# Patient Record
Sex: FEMALE | Race: WHITE | NOT HISPANIC OR LATINO | Employment: OTHER | URBAN - METROPOLITAN AREA
[De-identification: names, ages, dates, MRNs, and addresses within clinical notes are randomized per-mention and may not be internally consistent; named-entity substitution may affect disease eponyms.]

---

## 2017-10-04 ENCOUNTER — AMBULATORY - HEALTHEAST (OUTPATIENT)
Dept: NURSING | Facility: CLINIC | Age: 72
End: 2017-10-04

## 2017-10-23 ENCOUNTER — OFFICE VISIT - HEALTHEAST (OUTPATIENT)
Dept: FAMILY MEDICINE | Facility: CLINIC | Age: 72
End: 2017-10-23

## 2017-10-23 DIAGNOSIS — R73.09 ELEVATED GLUCOSE: ICD-10-CM

## 2017-10-23 DIAGNOSIS — K21.00 REFLUX ESOPHAGITIS: ICD-10-CM

## 2017-10-23 DIAGNOSIS — Z00.00 ROUTINE GENERAL MEDICAL EXAMINATION AT A HEALTH CARE FACILITY: ICD-10-CM

## 2017-10-23 DIAGNOSIS — Z12.31 ENCOUNTER FOR SCREENING MAMMOGRAM FOR BREAST CANCER: ICD-10-CM

## 2017-10-23 DIAGNOSIS — M89.9 DISORDER OF BONE AND CARTILAGE: ICD-10-CM

## 2017-10-23 DIAGNOSIS — M54.32 SCIATICA OF LEFT SIDE: ICD-10-CM

## 2017-10-23 DIAGNOSIS — E78.00 PURE HYPERCHOLESTEROLEMIA: ICD-10-CM

## 2017-10-23 DIAGNOSIS — M94.9 DISORDER OF BONE AND CARTILAGE: ICD-10-CM

## 2017-10-23 DIAGNOSIS — G47.00 INSOMNIA: ICD-10-CM

## 2017-10-23 LAB
CHOLEST SERPL-MCNC: 272 MG/DL
FASTING STATUS PATIENT QL REPORTED: YES
HBA1C MFR BLD: 5.5 % (ref 3.5–6)
HDLC SERPL-MCNC: 86 MG/DL
LDLC SERPL CALC-MCNC: 165 MG/DL
TRIGL SERPL-MCNC: 104 MG/DL

## 2017-10-23 ASSESSMENT — MIFFLIN-ST. JEOR: SCORE: 1457.97

## 2017-10-31 ENCOUNTER — COMMUNICATION - HEALTHEAST (OUTPATIENT)
Dept: FAMILY MEDICINE | Facility: CLINIC | Age: 72
End: 2017-10-31

## 2017-10-31 ENCOUNTER — AMBULATORY - HEALTHEAST (OUTPATIENT)
Dept: FAMILY MEDICINE | Facility: CLINIC | Age: 72
End: 2017-10-31

## 2017-10-31 DIAGNOSIS — R74.8 ELEVATED LIVER ENZYMES: ICD-10-CM

## 2017-11-06 ENCOUNTER — HOSPITAL ENCOUNTER (OUTPATIENT)
Dept: MAMMOGRAPHY | Facility: CLINIC | Age: 72
Discharge: HOME | End: 2017-11-06
Attending: FAMILY MEDICINE

## 2017-11-06 DIAGNOSIS — Z12.31 ENCOUNTER FOR SCREENING MAMMOGRAM FOR BREAST CANCER: ICD-10-CM

## 2017-11-07 ENCOUNTER — RECORDS - HEALTHEAST (OUTPATIENT)
Dept: ADMINISTRATIVE | Facility: OTHER | Age: 72
End: 2017-11-07

## 2017-11-07 ENCOUNTER — RECORDS - HEALTHEAST (OUTPATIENT)
Dept: BONE DENSITY | Facility: CLINIC | Age: 72
End: 2017-11-07

## 2017-11-07 DIAGNOSIS — M89.9 DISORDER OF BONE, UNSPECIFIED: ICD-10-CM

## 2017-11-07 DIAGNOSIS — M94.9 DISORDER OF CARTILAGE, UNSPECIFIED: ICD-10-CM

## 2017-11-21 ENCOUNTER — COMMUNICATION - HEALTHEAST (OUTPATIENT)
Dept: FAMILY MEDICINE | Facility: CLINIC | Age: 72
End: 2017-11-21

## 2017-11-24 ENCOUNTER — OFFICE VISIT - HEALTHEAST (OUTPATIENT)
Dept: PHYSICAL THERAPY | Facility: REHABILITATION | Age: 72
End: 2017-11-24

## 2017-11-24 DIAGNOSIS — M53.86 DECREASED ROM OF LUMBAR SPINE: ICD-10-CM

## 2017-11-24 DIAGNOSIS — M54.42 ACUTE LEFT-SIDED LOW BACK PAIN WITH LEFT-SIDED SCIATICA: ICD-10-CM

## 2017-11-24 DIAGNOSIS — M62.81 GENERALIZED MUSCLE WEAKNESS: ICD-10-CM

## 2017-12-05 ENCOUNTER — OFFICE VISIT - HEALTHEAST (OUTPATIENT)
Dept: PHYSICAL THERAPY | Facility: REHABILITATION | Age: 72
End: 2017-12-05

## 2017-12-05 DIAGNOSIS — M62.81 GENERALIZED MUSCLE WEAKNESS: ICD-10-CM

## 2017-12-05 DIAGNOSIS — M53.86 DECREASED ROM OF LUMBAR SPINE: ICD-10-CM

## 2017-12-05 DIAGNOSIS — M54.42 ACUTE LEFT-SIDED LOW BACK PAIN WITH LEFT-SIDED SCIATICA: ICD-10-CM

## 2017-12-12 ENCOUNTER — OFFICE VISIT - HEALTHEAST (OUTPATIENT)
Dept: PHYSICAL THERAPY | Facility: REHABILITATION | Age: 72
End: 2017-12-12

## 2017-12-12 DIAGNOSIS — M53.86 DECREASED ROM OF LUMBAR SPINE: ICD-10-CM

## 2017-12-12 DIAGNOSIS — M62.81 GENERALIZED MUSCLE WEAKNESS: ICD-10-CM

## 2017-12-12 DIAGNOSIS — M54.42 ACUTE LEFT-SIDED LOW BACK PAIN WITH LEFT-SIDED SCIATICA: ICD-10-CM

## 2017-12-19 ENCOUNTER — OFFICE VISIT - HEALTHEAST (OUTPATIENT)
Dept: PHYSICAL THERAPY | Facility: REHABILITATION | Age: 72
End: 2017-12-19

## 2017-12-19 DIAGNOSIS — M62.81 GENERALIZED MUSCLE WEAKNESS: ICD-10-CM

## 2017-12-19 DIAGNOSIS — M53.86 DECREASED ROM OF LUMBAR SPINE: ICD-10-CM

## 2017-12-19 DIAGNOSIS — M54.42 ACUTE LEFT-SIDED LOW BACK PAIN WITH LEFT-SIDED SCIATICA: ICD-10-CM

## 2017-12-26 ENCOUNTER — OFFICE VISIT - HEALTHEAST (OUTPATIENT)
Dept: PHYSICAL THERAPY | Facility: REHABILITATION | Age: 72
End: 2017-12-26

## 2017-12-26 DIAGNOSIS — M53.86 DECREASED ROM OF LUMBAR SPINE: ICD-10-CM

## 2017-12-26 DIAGNOSIS — M54.42 ACUTE LEFT-SIDED LOW BACK PAIN WITH LEFT-SIDED SCIATICA: ICD-10-CM

## 2017-12-26 DIAGNOSIS — M62.81 GENERALIZED MUSCLE WEAKNESS: ICD-10-CM

## 2017-12-27 ENCOUNTER — RECORDS - HEALTHEAST (OUTPATIENT)
Dept: GENERAL RADIOLOGY | Facility: CLINIC | Age: 72
End: 2017-12-27

## 2017-12-27 ENCOUNTER — OFFICE VISIT - HEALTHEAST (OUTPATIENT)
Dept: FAMILY MEDICINE | Facility: CLINIC | Age: 72
End: 2017-12-27

## 2017-12-27 DIAGNOSIS — M81.0 AGE-RELATED OSTEOPOROSIS WITHOUT CURRENT PATHOLOGICAL FRACTURE: ICD-10-CM

## 2017-12-27 DIAGNOSIS — M81.0 OSTEOPOROSIS: ICD-10-CM

## 2017-12-27 DIAGNOSIS — M54.32 LEFT SIDED SCIATICA: ICD-10-CM

## 2017-12-27 DIAGNOSIS — M54.32 SCIATICA, LEFT SIDE: ICD-10-CM

## 2017-12-27 ASSESSMENT — MIFFLIN-ST. JEOR: SCORE: 1430.75

## 2017-12-28 ENCOUNTER — COMMUNICATION - HEALTHEAST (OUTPATIENT)
Dept: FAMILY MEDICINE | Facility: CLINIC | Age: 72
End: 2017-12-28

## 2018-01-03 ENCOUNTER — HOSPITAL ENCOUNTER (OUTPATIENT)
Dept: PHYSICAL MEDICINE AND REHAB | Facility: CLINIC | Age: 73
Discharge: HOME | End: 2018-01-03
Attending: FAMILY MEDICINE

## 2018-01-03 DIAGNOSIS — M51.369 LUMBAR DEGENERATIVE DISC DISEASE: ICD-10-CM

## 2018-01-03 DIAGNOSIS — M54.50 BILATERAL LOW BACK PAIN: ICD-10-CM

## 2018-01-03 DIAGNOSIS — M47.816 LUMBAR FACET ARTHROPATHY: ICD-10-CM

## 2018-01-03 DIAGNOSIS — M54.16 LUMBAR RADICULITIS: ICD-10-CM

## 2018-01-03 DIAGNOSIS — M43.16 SPONDYLOLISTHESIS, LUMBAR REGION: ICD-10-CM

## 2018-01-09 ENCOUNTER — HOSPITAL ENCOUNTER (OUTPATIENT)
Dept: MRI IMAGING | Facility: CLINIC | Age: 73
Discharge: HOME | End: 2018-01-09

## 2018-01-09 DIAGNOSIS — M47.816 LUMBAR FACET ARTHROPATHY: ICD-10-CM

## 2018-01-09 DIAGNOSIS — M43.16 SPONDYLOLISTHESIS, LUMBAR REGION: ICD-10-CM

## 2018-01-09 DIAGNOSIS — M54.50 BILATERAL LOW BACK PAIN: ICD-10-CM

## 2018-01-09 DIAGNOSIS — M51.369 LUMBAR DEGENERATIVE DISC DISEASE: ICD-10-CM

## 2018-01-09 DIAGNOSIS — M12.88 OTHER SPECIFIC ARTHROPATHIES, NOT ELSEWHERE CLASSIFIED, OTHER SPECIFIED SITE: ICD-10-CM

## 2018-01-09 DIAGNOSIS — M54.16 LUMBAR RADICULITIS: ICD-10-CM

## 2018-01-10 ENCOUNTER — COMMUNICATION - HEALTHEAST (OUTPATIENT)
Dept: PHYSICAL MEDICINE AND REHAB | Facility: CLINIC | Age: 73
End: 2018-01-10

## 2018-01-10 DIAGNOSIS — M54.50 LOW BACK PAIN: ICD-10-CM

## 2018-01-22 ENCOUNTER — HOSPITAL ENCOUNTER (OUTPATIENT)
Dept: PHYSICAL MEDICINE AND REHAB | Facility: CLINIC | Age: 73
Discharge: HOME | End: 2018-01-22
Attending: PHYSICAL MEDICINE & REHABILITATION

## 2018-01-22 DIAGNOSIS — M54.50 LOW BACK PAIN: ICD-10-CM

## 2018-02-07 ENCOUNTER — HOSPITAL ENCOUNTER (OUTPATIENT)
Dept: PHYSICAL MEDICINE AND REHAB | Facility: CLINIC | Age: 73
Discharge: HOME | End: 2018-02-07
Attending: NURSE PRACTITIONER

## 2018-02-07 DIAGNOSIS — M43.16 SPONDYLOLISTHESIS, LUMBAR REGION: ICD-10-CM

## 2018-02-07 DIAGNOSIS — M51.369 LUMBAR DEGENERATIVE DISC DISEASE: ICD-10-CM

## 2018-02-07 DIAGNOSIS — M54.16 LUMBAR RADICULITIS: ICD-10-CM

## 2018-02-07 DIAGNOSIS — M54.16 LEFT LUMBAR RADICULOPATHY: ICD-10-CM

## 2018-02-07 DIAGNOSIS — M48.061 LUMBAR STENOSIS: ICD-10-CM

## 2018-02-07 DIAGNOSIS — M47.816 LUMBAR FACET ARTHROPATHY: ICD-10-CM

## 2018-02-07 DIAGNOSIS — M54.50 BILATERAL LOW BACK PAIN: ICD-10-CM

## 2018-02-19 ENCOUNTER — HOSPITAL ENCOUNTER (OUTPATIENT)
Dept: RADIOLOGY | Facility: CLINIC | Age: 73
Discharge: HOME | End: 2018-02-19

## 2018-02-19 ENCOUNTER — HOSPITAL ENCOUNTER (OUTPATIENT)
Dept: PHYSICAL MEDICINE AND REHAB | Facility: CLINIC | Age: 73
Discharge: HOME | End: 2018-02-19
Attending: PHYSICAL MEDICINE & REHABILITATION

## 2018-02-19 DIAGNOSIS — M54.16 LUMBAR RADICULITIS: ICD-10-CM

## 2018-02-19 DIAGNOSIS — M54.50 BILATERAL LOW BACK PAIN: ICD-10-CM

## 2018-02-19 DIAGNOSIS — M43.16 SPONDYLOLISTHESIS, LUMBAR REGION: ICD-10-CM

## 2018-02-19 DIAGNOSIS — M54.16 LEFT LUMBAR RADICULOPATHY: ICD-10-CM

## 2018-02-19 DIAGNOSIS — M48.061 LUMBAR STENOSIS: ICD-10-CM

## 2018-02-21 ENCOUNTER — COMMUNICATION - HEALTHEAST (OUTPATIENT)
Dept: FAMILY MEDICINE | Facility: CLINIC | Age: 73
End: 2018-02-21

## 2018-02-21 ENCOUNTER — COMMUNICATION - HEALTHEAST (OUTPATIENT)
Dept: PHYSICAL MEDICINE AND REHAB | Facility: CLINIC | Age: 73
End: 2018-02-21

## 2018-02-21 DIAGNOSIS — K63.5 COLON POLYPS: ICD-10-CM

## 2018-02-27 ENCOUNTER — COMMUNICATION - HEALTHEAST (OUTPATIENT)
Dept: PHYSICAL MEDICINE AND REHAB | Facility: CLINIC | Age: 73
End: 2018-02-27

## 2018-02-27 DIAGNOSIS — M54.9 BACK PAIN: ICD-10-CM

## 2018-02-27 DIAGNOSIS — M54.16 LUMBAR RADICULITIS: ICD-10-CM

## 2018-04-02 ENCOUNTER — HOSPITAL ENCOUNTER (OUTPATIENT)
Dept: PHYSICAL MEDICINE AND REHAB | Facility: CLINIC | Age: 73
Discharge: HOME | End: 2018-04-02
Attending: PHYSICAL MEDICINE & REHABILITATION

## 2018-04-02 DIAGNOSIS — M54.16 LUMBAR RADICULITIS: ICD-10-CM

## 2018-04-16 ENCOUNTER — HOSPITAL ENCOUNTER (OUTPATIENT)
Dept: PHYSICAL MEDICINE AND REHAB | Facility: CLINIC | Age: 73
Discharge: HOME | End: 2018-04-16
Attending: NURSE PRACTITIONER

## 2018-04-16 DIAGNOSIS — M51.369 LUMBAR DEGENERATIVE DISC DISEASE: ICD-10-CM

## 2018-04-16 DIAGNOSIS — M54.16 LEFT LUMBAR RADICULOPATHY: ICD-10-CM

## 2018-04-16 DIAGNOSIS — M43.16 SPONDYLOLISTHESIS, LUMBAR REGION: ICD-10-CM

## 2018-07-17 ENCOUNTER — RECORDS - HEALTHEAST (OUTPATIENT)
Dept: ADMINISTRATIVE | Facility: OTHER | Age: 73
End: 2018-07-17

## 2018-07-18 ENCOUNTER — COMMUNICATION - HEALTHEAST (OUTPATIENT)
Dept: SCHEDULING | Facility: CLINIC | Age: 73
End: 2018-07-18

## 2018-07-19 ENCOUNTER — RECORDS - HEALTHEAST (OUTPATIENT)
Dept: ADMINISTRATIVE | Facility: OTHER | Age: 73
End: 2018-07-19

## 2018-07-23 ENCOUNTER — OFFICE VISIT - HEALTHEAST (OUTPATIENT)
Dept: FAMILY MEDICINE | Facility: CLINIC | Age: 73
End: 2018-07-23

## 2018-07-23 DIAGNOSIS — R03.0 ELEVATED BP WITHOUT DIAGNOSIS OF HYPERTENSION: ICD-10-CM

## 2018-07-23 ASSESSMENT — MIFFLIN-ST. JEOR: SCORE: 1404.67

## 2018-08-01 ENCOUNTER — COMMUNICATION - HEALTHEAST (OUTPATIENT)
Dept: FAMILY MEDICINE | Facility: CLINIC | Age: 73
End: 2018-08-01

## 2018-08-01 DIAGNOSIS — I10 HTN (HYPERTENSION): ICD-10-CM

## 2018-08-08 ENCOUNTER — OFFICE VISIT - HEALTHEAST (OUTPATIENT)
Dept: FAMILY MEDICINE | Facility: CLINIC | Age: 73
End: 2018-08-08

## 2018-08-08 DIAGNOSIS — I10 HTN (HYPERTENSION): ICD-10-CM

## 2018-08-08 LAB
ALBUMIN SERPL-MCNC: 4 G/DL (ref 3.5–5)
ALP SERPL-CCNC: 87 U/L (ref 45–120)
ALT SERPL W P-5'-P-CCNC: 20 U/L (ref 0–45)
ANION GAP SERPL CALCULATED.3IONS-SCNC: 11 MMOL/L (ref 5–18)
AST SERPL W P-5'-P-CCNC: 22 U/L (ref 0–40)
BILIRUB SERPL-MCNC: 1 MG/DL (ref 0–1)
BUN SERPL-MCNC: 21 MG/DL (ref 8–28)
CALCIUM SERPL-MCNC: 9.1 MG/DL (ref 8.5–10.5)
CHLORIDE BLD-SCNC: 105 MMOL/L (ref 98–107)
CO2 SERPL-SCNC: 24 MMOL/L (ref 22–31)
CREAT SERPL-MCNC: 0.77 MG/DL (ref 0.6–1.1)
GFR SERPL CREATININE-BSD FRML MDRD: >60 ML/MIN/1.73M2
GLUCOSE BLD-MCNC: 91 MG/DL (ref 70–125)
POTASSIUM BLD-SCNC: 4.8 MMOL/L (ref 3.5–5)
PROT SERPL-MCNC: 6.9 G/DL (ref 6–8)
SODIUM SERPL-SCNC: 140 MMOL/L (ref 136–145)

## 2018-09-06 ENCOUNTER — COMMUNICATION - HEALTHEAST (OUTPATIENT)
Dept: FAMILY MEDICINE | Facility: CLINIC | Age: 73
End: 2018-09-06

## 2018-09-06 ENCOUNTER — AMBULATORY - HEALTHEAST (OUTPATIENT)
Dept: FAMILY MEDICINE | Facility: CLINIC | Age: 73
End: 2018-09-06

## 2018-09-14 ENCOUNTER — OFFICE VISIT - HEALTHEAST (OUTPATIENT)
Dept: FAMILY MEDICINE | Facility: CLINIC | Age: 73
End: 2018-09-14

## 2018-09-14 DIAGNOSIS — I10 ESSENTIAL HYPERTENSION: ICD-10-CM

## 2018-09-14 DIAGNOSIS — R05.9 COUGH: ICD-10-CM

## 2018-09-14 LAB
ANION GAP SERPL CALCULATED.3IONS-SCNC: 9 MMOL/L (ref 5–18)
BUN SERPL-MCNC: 41 MG/DL (ref 8–28)
CALCIUM SERPL-MCNC: 9.9 MG/DL (ref 8.5–10.5)
CHLORIDE BLD-SCNC: 103 MMOL/L (ref 98–107)
CO2 SERPL-SCNC: 28 MMOL/L (ref 22–31)
CREAT SERPL-MCNC: 1.2 MG/DL (ref 0.6–1.1)
GFR SERPL CREATININE-BSD FRML MDRD: 44 ML/MIN/1.73M2
GLUCOSE BLD-MCNC: 92 MG/DL (ref 70–125)
MAGNESIUM SERPL-MCNC: 2.5 MG/DL (ref 1.8–2.6)
POTASSIUM BLD-SCNC: 5.3 MMOL/L (ref 3.5–5)
SODIUM SERPL-SCNC: 140 MMOL/L (ref 136–145)

## 2018-09-16 ENCOUNTER — COMMUNICATION - HEALTHEAST (OUTPATIENT)
Dept: FAMILY MEDICINE | Facility: CLINIC | Age: 73
End: 2018-09-16

## 2018-10-26 ENCOUNTER — OFFICE VISIT - HEALTHEAST (OUTPATIENT)
Dept: FAMILY MEDICINE | Facility: CLINIC | Age: 73
End: 2018-10-26

## 2018-10-26 DIAGNOSIS — I10 ESSENTIAL HYPERTENSION: ICD-10-CM

## 2018-10-26 DIAGNOSIS — R74.8 ELEVATED LIVER ENZYMES: ICD-10-CM

## 2018-10-26 DIAGNOSIS — L57.0 AK (ACTINIC KERATOSIS): ICD-10-CM

## 2018-10-26 LAB
ALBUMIN SERPL-MCNC: 4.2 G/DL (ref 3.5–5)
ALP SERPL-CCNC: 87 U/L (ref 45–120)
ALT SERPL W P-5'-P-CCNC: 20 U/L (ref 0–45)
ANION GAP SERPL CALCULATED.3IONS-SCNC: 7 MMOL/L (ref 5–18)
AST SERPL W P-5'-P-CCNC: 19 U/L (ref 0–40)
BILIRUB DIRECT SERPL-MCNC: 0.3 MG/DL
BILIRUB SERPL-MCNC: 1 MG/DL (ref 0–1)
BUN SERPL-MCNC: 32 MG/DL (ref 8–28)
CALCIUM SERPL-MCNC: 9.8 MG/DL (ref 8.5–10.5)
CHLORIDE BLD-SCNC: 105 MMOL/L (ref 98–107)
CO2 SERPL-SCNC: 30 MMOL/L (ref 22–31)
CREAT SERPL-MCNC: 1.2 MG/DL (ref 0.6–1.1)
GFR SERPL CREATININE-BSD FRML MDRD: 44 ML/MIN/1.73M2
GLUCOSE BLD-MCNC: 90 MG/DL (ref 70–125)
POTASSIUM BLD-SCNC: 4.5 MMOL/L (ref 3.5–5)
PROT SERPL-MCNC: 7.2 G/DL (ref 6–8)
SODIUM SERPL-SCNC: 142 MMOL/L (ref 136–145)

## 2018-10-26 ASSESSMENT — MIFFLIN-ST. JEOR: SCORE: 1421.68

## 2018-10-29 ENCOUNTER — COMMUNICATION - HEALTHEAST (OUTPATIENT)
Dept: FAMILY MEDICINE | Facility: CLINIC | Age: 73
End: 2018-10-29

## 2018-12-20 ENCOUNTER — HOSPITAL ENCOUNTER (OUTPATIENT)
Dept: PHYSICAL MEDICINE AND REHAB | Facility: CLINIC | Age: 73
Discharge: HOME | End: 2018-12-20
Attending: NURSE PRACTITIONER

## 2018-12-20 DIAGNOSIS — G89.29 CHRONIC BILATERAL LOW BACK PAIN WITH BILATERAL SCIATICA: ICD-10-CM

## 2018-12-20 DIAGNOSIS — M54.41 CHRONIC BILATERAL LOW BACK PAIN WITH BILATERAL SCIATICA: ICD-10-CM

## 2018-12-20 DIAGNOSIS — M43.16 SPONDYLOLISTHESIS, LUMBAR REGION: ICD-10-CM

## 2018-12-20 DIAGNOSIS — M54.42 CHRONIC BILATERAL LOW BACK PAIN WITH BILATERAL SCIATICA: ICD-10-CM

## 2018-12-20 DIAGNOSIS — M48.061 SPINAL STENOSIS OF LUMBAR REGION WITHOUT NEUROGENIC CLAUDICATION: ICD-10-CM

## 2018-12-20 DIAGNOSIS — M54.16 LUMBAR RADICULITIS: ICD-10-CM

## 2018-12-20 DIAGNOSIS — M51.369 LUMBAR DEGENERATIVE DISC DISEASE: ICD-10-CM

## 2018-12-26 ENCOUNTER — HOSPITAL ENCOUNTER (OUTPATIENT)
Dept: PHYSICAL MEDICINE AND REHAB | Facility: CLINIC | Age: 73
Discharge: HOME | End: 2018-12-26
Attending: PHYSICAL MEDICINE & REHABILITATION

## 2018-12-26 DIAGNOSIS — G89.29 CHRONIC BILATERAL LOW BACK PAIN WITH BILATERAL SCIATICA: ICD-10-CM

## 2018-12-26 DIAGNOSIS — M54.16 LUMBAR RADICULITIS: ICD-10-CM

## 2018-12-26 DIAGNOSIS — M54.41 CHRONIC BILATERAL LOW BACK PAIN WITH BILATERAL SCIATICA: ICD-10-CM

## 2018-12-26 DIAGNOSIS — M54.42 CHRONIC BILATERAL LOW BACK PAIN WITH BILATERAL SCIATICA: ICD-10-CM

## 2019-01-09 ENCOUNTER — HOSPITAL ENCOUNTER (OUTPATIENT)
Dept: PHYSICAL MEDICINE AND REHAB | Facility: CLINIC | Age: 74
Discharge: HOME | End: 2019-01-09
Attending: NURSE PRACTITIONER

## 2019-01-09 ENCOUNTER — OFFICE VISIT - HEALTHEAST (OUTPATIENT)
Dept: PHYSICAL THERAPY | Facility: CLINIC | Age: 74
End: 2019-01-09

## 2019-01-09 DIAGNOSIS — S29.019A THORACIC MYOFASCIAL STRAIN, INITIAL ENCOUNTER: ICD-10-CM

## 2019-01-09 DIAGNOSIS — M51.369 LUMBAR DEGENERATIVE DISC DISEASE: ICD-10-CM

## 2019-01-09 DIAGNOSIS — M43.16 SPONDYLOLISTHESIS, LUMBAR REGION: ICD-10-CM

## 2019-01-09 DIAGNOSIS — M54.16 LEFT LUMBAR RADICULOPATHY: ICD-10-CM

## 2019-01-09 DIAGNOSIS — M54.16 LUMBAR RADICULITIS: ICD-10-CM

## 2019-01-09 DIAGNOSIS — M54.41 CHRONIC BILATERAL LOW BACK PAIN WITH BILATERAL SCIATICA: ICD-10-CM

## 2019-01-09 DIAGNOSIS — G89.29 CHRONIC BILATERAL LOW BACK PAIN WITH BILATERAL SCIATICA: ICD-10-CM

## 2019-01-09 DIAGNOSIS — M54.42 CHRONIC BILATERAL LOW BACK PAIN WITH BILATERAL SCIATICA: ICD-10-CM

## 2019-01-09 DIAGNOSIS — M48.061 SPINAL STENOSIS OF LUMBAR REGION WITHOUT NEUROGENIC CLAUDICATION: ICD-10-CM

## 2019-01-09 DIAGNOSIS — R25.2 MUSCLE CRAMP: ICD-10-CM

## 2019-01-11 ENCOUNTER — OFFICE VISIT - HEALTHEAST (OUTPATIENT)
Dept: PHYSICAL THERAPY | Facility: CLINIC | Age: 74
End: 2019-01-11

## 2019-01-11 DIAGNOSIS — G89.29 CHRONIC BILATERAL LOW BACK PAIN WITH BILATERAL SCIATICA: ICD-10-CM

## 2019-01-11 DIAGNOSIS — M51.369 LUMBAR DEGENERATIVE DISC DISEASE: ICD-10-CM

## 2019-01-11 DIAGNOSIS — M54.42 CHRONIC BILATERAL LOW BACK PAIN WITH BILATERAL SCIATICA: ICD-10-CM

## 2019-01-11 DIAGNOSIS — M43.16 SPONDYLOLISTHESIS, LUMBAR REGION: ICD-10-CM

## 2019-01-11 DIAGNOSIS — M54.16 LUMBAR RADICULITIS: ICD-10-CM

## 2019-01-11 DIAGNOSIS — M54.41 CHRONIC BILATERAL LOW BACK PAIN WITH BILATERAL SCIATICA: ICD-10-CM

## 2019-01-14 ENCOUNTER — OFFICE VISIT - HEALTHEAST (OUTPATIENT)
Dept: PHYSICAL THERAPY | Facility: CLINIC | Age: 74
End: 2019-01-14

## 2019-01-14 ENCOUNTER — RECORDS - HEALTHEAST (OUTPATIENT)
Dept: ADMINISTRATIVE | Facility: OTHER | Age: 74
End: 2019-01-14

## 2019-01-14 DIAGNOSIS — M43.16 SPONDYLOLISTHESIS, LUMBAR REGION: ICD-10-CM

## 2019-01-14 DIAGNOSIS — M54.42 CHRONIC BILATERAL LOW BACK PAIN WITH BILATERAL SCIATICA: ICD-10-CM

## 2019-01-14 DIAGNOSIS — M54.16 LUMBAR RADICULITIS: ICD-10-CM

## 2019-01-14 DIAGNOSIS — M54.41 CHRONIC BILATERAL LOW BACK PAIN WITH BILATERAL SCIATICA: ICD-10-CM

## 2019-01-14 DIAGNOSIS — G89.29 CHRONIC BILATERAL LOW BACK PAIN WITH BILATERAL SCIATICA: ICD-10-CM

## 2019-01-14 DIAGNOSIS — M48.061 SPINAL STENOSIS OF LUMBAR REGION WITHOUT NEUROGENIC CLAUDICATION: ICD-10-CM

## 2019-01-14 DIAGNOSIS — M51.369 LUMBAR DEGENERATIVE DISC DISEASE: ICD-10-CM

## 2019-01-16 ENCOUNTER — OFFICE VISIT - HEALTHEAST (OUTPATIENT)
Dept: PHYSICAL THERAPY | Facility: CLINIC | Age: 74
End: 2019-01-16

## 2019-01-16 DIAGNOSIS — M54.16 LUMBAR RADICULITIS: ICD-10-CM

## 2019-01-16 DIAGNOSIS — M54.41 CHRONIC BILATERAL LOW BACK PAIN WITH BILATERAL SCIATICA: ICD-10-CM

## 2019-01-16 DIAGNOSIS — M54.42 CHRONIC BILATERAL LOW BACK PAIN WITH BILATERAL SCIATICA: ICD-10-CM

## 2019-01-16 DIAGNOSIS — G89.29 CHRONIC BILATERAL LOW BACK PAIN WITH BILATERAL SCIATICA: ICD-10-CM

## 2019-01-16 DIAGNOSIS — M43.16 SPONDYLOLISTHESIS, LUMBAR REGION: ICD-10-CM

## 2019-01-21 ENCOUNTER — HOSPITAL ENCOUNTER (OUTPATIENT)
Dept: PHYSICAL MEDICINE AND REHAB | Facility: CLINIC | Age: 74
Discharge: HOME | End: 2019-01-21
Attending: PHYSICAL MEDICINE & REHABILITATION

## 2019-01-21 DIAGNOSIS — M54.16 LEFT LUMBAR RADICULOPATHY: ICD-10-CM

## 2019-01-29 ENCOUNTER — RECORDS - HEALTHEAST (OUTPATIENT)
Dept: ADMINISTRATIVE | Facility: OTHER | Age: 74
End: 2019-01-29

## 2019-02-04 ENCOUNTER — HOSPITAL ENCOUNTER (OUTPATIENT)
Dept: PHYSICAL MEDICINE AND REHAB | Facility: CLINIC | Age: 74
Discharge: HOME | End: 2019-02-04
Attending: NURSE PRACTITIONER

## 2019-02-04 DIAGNOSIS — M54.41 CHRONIC BILATERAL LOW BACK PAIN WITH BILATERAL SCIATICA: ICD-10-CM

## 2019-02-04 DIAGNOSIS — M43.16 SPONDYLOLISTHESIS, LUMBAR REGION: ICD-10-CM

## 2019-02-04 DIAGNOSIS — G89.29 CHRONIC BILATERAL LOW BACK PAIN WITH BILATERAL SCIATICA: ICD-10-CM

## 2019-02-04 DIAGNOSIS — M54.16 LEFT LUMBAR RADICULOPATHY: ICD-10-CM

## 2019-02-04 DIAGNOSIS — M51.369 LUMBAR DEGENERATIVE DISC DISEASE: ICD-10-CM

## 2019-02-04 DIAGNOSIS — M54.42 CHRONIC BILATERAL LOW BACK PAIN WITH BILATERAL SCIATICA: ICD-10-CM

## 2019-02-04 DIAGNOSIS — M54.16 LUMBAR RADICULITIS: ICD-10-CM

## 2019-02-15 ENCOUNTER — RECORDS - HEALTHEAST (OUTPATIENT)
Dept: ADMINISTRATIVE | Facility: OTHER | Age: 74
End: 2019-02-15

## 2019-02-28 ENCOUNTER — OFFICE VISIT - HEALTHEAST (OUTPATIENT)
Dept: PODIATRY | Facility: CLINIC | Age: 74
End: 2019-02-28

## 2019-02-28 DIAGNOSIS — B35.1 NAIL FUNGUS: ICD-10-CM

## 2019-02-28 DIAGNOSIS — L84 TYLOMA: ICD-10-CM

## 2019-02-28 DIAGNOSIS — L60.2 ONYCHAUXIS: ICD-10-CM

## 2019-02-28 ASSESSMENT — MIFFLIN-ST. JEOR: SCORE: 1376.32

## 2019-04-19 ENCOUNTER — OFFICE VISIT - HEALTHEAST (OUTPATIENT)
Dept: FAMILY MEDICINE | Facility: CLINIC | Age: 74
End: 2019-04-19

## 2019-04-19 DIAGNOSIS — J06.9 VIRAL UPPER RESPIRATORY TRACT INFECTION WITH COUGH: ICD-10-CM

## 2019-04-19 ASSESSMENT — MIFFLIN-ST. JEOR: SCORE: 1394.47

## 2019-05-15 ENCOUNTER — HOSPITAL ENCOUNTER (OUTPATIENT)
Dept: PHYSICAL MEDICINE AND REHAB | Facility: CLINIC | Age: 74
Discharge: HOME | End: 2019-05-15
Attending: NURSE PRACTITIONER

## 2019-05-15 DIAGNOSIS — M54.41 CHRONIC BILATERAL LOW BACK PAIN WITH BILATERAL SCIATICA: ICD-10-CM

## 2019-05-15 DIAGNOSIS — M51.369 LUMBAR DEGENERATIVE DISC DISEASE: ICD-10-CM

## 2019-05-15 DIAGNOSIS — M48.061 SPINAL STENOSIS OF LUMBAR REGION WITHOUT NEUROGENIC CLAUDICATION: ICD-10-CM

## 2019-05-15 DIAGNOSIS — M43.16 SPONDYLOLISTHESIS, LUMBAR REGION: ICD-10-CM

## 2019-05-15 DIAGNOSIS — G89.29 CHRONIC BILATERAL LOW BACK PAIN WITH BILATERAL SCIATICA: ICD-10-CM

## 2019-05-15 DIAGNOSIS — M54.16 LEFT LUMBAR RADICULOPATHY: ICD-10-CM

## 2019-05-15 DIAGNOSIS — M54.6 PAIN IN THORACIC SPINE: ICD-10-CM

## 2019-05-15 DIAGNOSIS — M54.16 LUMBAR RADICULITIS: ICD-10-CM

## 2019-05-15 DIAGNOSIS — M54.42 CHRONIC BILATERAL LOW BACK PAIN WITH BILATERAL SCIATICA: ICD-10-CM

## 2019-05-30 ENCOUNTER — HOSPITAL ENCOUNTER (OUTPATIENT)
Dept: PHYSICAL MEDICINE AND REHAB | Facility: CLINIC | Age: 74
Discharge: HOME | End: 2019-05-30
Attending: PHYSICAL MEDICINE & REHABILITATION

## 2019-05-30 DIAGNOSIS — G89.29 CHRONIC BILATERAL LOW BACK PAIN WITH BILATERAL SCIATICA: ICD-10-CM

## 2019-05-30 DIAGNOSIS — M54.41 CHRONIC BILATERAL LOW BACK PAIN WITH BILATERAL SCIATICA: ICD-10-CM

## 2019-05-30 DIAGNOSIS — M51.369 LUMBAR DEGENERATIVE DISC DISEASE: ICD-10-CM

## 2019-05-30 DIAGNOSIS — M54.42 CHRONIC BILATERAL LOW BACK PAIN WITH BILATERAL SCIATICA: ICD-10-CM

## 2019-05-30 DIAGNOSIS — M54.16 LUMBAR RADICULITIS: ICD-10-CM

## 2019-06-12 ENCOUNTER — HOSPITAL ENCOUNTER (OUTPATIENT)
Dept: PHYSICAL MEDICINE AND REHAB | Facility: CLINIC | Age: 74
Discharge: HOME | End: 2019-06-12
Attending: NURSE PRACTITIONER

## 2019-06-12 DIAGNOSIS — M54.42 CHRONIC BILATERAL LOW BACK PAIN WITH BILATERAL SCIATICA: ICD-10-CM

## 2019-06-12 DIAGNOSIS — M54.16 LUMBAR RADICULITIS: ICD-10-CM

## 2019-06-12 DIAGNOSIS — M54.41 CHRONIC BILATERAL LOW BACK PAIN WITH BILATERAL SCIATICA: ICD-10-CM

## 2019-06-12 DIAGNOSIS — M43.16 SPONDYLOLISTHESIS, LUMBAR REGION: ICD-10-CM

## 2019-06-12 DIAGNOSIS — G89.29 CHRONIC BILATERAL LOW BACK PAIN WITH BILATERAL SCIATICA: ICD-10-CM

## 2019-06-12 DIAGNOSIS — M48.061 SPINAL STENOSIS OF LUMBAR REGION WITHOUT NEUROGENIC CLAUDICATION: ICD-10-CM

## 2019-06-12 DIAGNOSIS — M51.369 LUMBAR DEGENERATIVE DISC DISEASE: ICD-10-CM

## 2019-07-18 ENCOUNTER — COMMUNICATION - HEALTHEAST (OUTPATIENT)
Dept: FAMILY MEDICINE | Facility: CLINIC | Age: 74
End: 2019-07-18

## 2019-07-18 DIAGNOSIS — R06.83 SNORING: ICD-10-CM

## 2019-10-08 ENCOUNTER — AMBULATORY - HEALTHEAST (OUTPATIENT)
Dept: NURSING | Facility: CLINIC | Age: 74
End: 2019-10-08

## 2019-10-08 DIAGNOSIS — Z23 FLU VACCINE NEED: ICD-10-CM

## 2019-10-18 ENCOUNTER — OFFICE VISIT - HEALTHEAST (OUTPATIENT)
Dept: SLEEP MEDICINE | Facility: CLINIC | Age: 74
End: 2019-10-18

## 2019-10-18 DIAGNOSIS — R06.83 SNORING: ICD-10-CM

## 2019-10-18 DIAGNOSIS — F51.04 PSYCHOPHYSIOLOGICAL INSOMNIA: ICD-10-CM

## 2019-10-18 ASSESSMENT — MIFFLIN-ST. JEOR: SCORE: 1430.75

## 2019-12-07 ENCOUNTER — COMMUNICATION - HEALTHEAST (OUTPATIENT)
Dept: FAMILY MEDICINE | Facility: CLINIC | Age: 74
End: 2019-12-07

## 2019-12-07 DIAGNOSIS — I10 ESSENTIAL HYPERTENSION: ICD-10-CM

## 2019-12-08 ENCOUNTER — AMBULATORY - HEALTHEAST (OUTPATIENT)
Dept: FAMILY MEDICINE | Facility: CLINIC | Age: 74
End: 2019-12-08

## 2019-12-08 DIAGNOSIS — I10 ESSENTIAL HYPERTENSION: ICD-10-CM

## 2020-01-10 ENCOUNTER — COMMUNICATION - HEALTHEAST (OUTPATIENT)
Dept: FAMILY MEDICINE | Facility: CLINIC | Age: 75
End: 2020-01-10

## 2020-01-10 DIAGNOSIS — I10 ESSENTIAL HYPERTENSION: ICD-10-CM

## 2020-01-11 ENCOUNTER — COMMUNICATION - HEALTHEAST (OUTPATIENT)
Dept: FAMILY MEDICINE | Facility: CLINIC | Age: 75
End: 2020-01-11

## 2020-01-11 DIAGNOSIS — I10 ESSENTIAL HYPERTENSION: ICD-10-CM

## 2020-03-02 ENCOUNTER — COMMUNICATION - HEALTHEAST (OUTPATIENT)
Dept: SCHEDULING | Facility: CLINIC | Age: 75
End: 2020-03-02

## 2020-03-02 ENCOUNTER — HOSPITAL ENCOUNTER (OUTPATIENT)
Dept: ULTRASOUND IMAGING | Facility: HOSPITAL | Age: 75
Discharge: HOME | End: 2020-03-02
Attending: FAMILY MEDICINE

## 2020-03-02 ENCOUNTER — OFFICE VISIT - HEALTHEAST (OUTPATIENT)
Dept: FAMILY MEDICINE | Facility: CLINIC | Age: 75
End: 2020-03-02

## 2020-03-02 DIAGNOSIS — M79.662 PAIN OF LEFT LOWER LEG: ICD-10-CM

## 2020-03-02 DIAGNOSIS — I87.2 VENOUS (PERIPHERAL) INSUFFICIENCY: ICD-10-CM

## 2020-03-02 DIAGNOSIS — Z12.31 VISIT FOR SCREENING MAMMOGRAM: ICD-10-CM

## 2020-03-02 RX ORDER — ACETAMINOPHEN 500 MG
1000 TABLET ORAL EVERY 8 HOURS PRN
Status: SHIPPED | COMMUNITY
Start: 2020-03-02

## 2020-03-02 RX ORDER — MULTIPLE VITAMINS W/ MINERALS TAB 9MG-400MCG
1 TAB ORAL DAILY
Status: SHIPPED | COMMUNITY
Start: 2020-03-02 | End: 2024-01-02

## 2020-03-02 ASSESSMENT — ANXIETY QUESTIONNAIRES
6. BECOMING EASILY ANNOYED OR IRRITABLE: NOT AT ALL
7. FEELING AFRAID AS IF SOMETHING AWFUL MIGHT HAPPEN: NOT AT ALL
4. TROUBLE RELAXING: SEVERAL DAYS
2. NOT BEING ABLE TO STOP OR CONTROL WORRYING: NOT AT ALL
3. WORRYING TOO MUCH ABOUT DIFFERENT THINGS: NOT AT ALL
GAD7 TOTAL SCORE: 1
1. FEELING NERVOUS, ANXIOUS, OR ON EDGE: NOT AT ALL
5. BEING SO RESTLESS THAT IT IS HARD TO SIT STILL: NOT AT ALL

## 2020-03-02 ASSESSMENT — PATIENT HEALTH QUESTIONNAIRE - PHQ9: SUM OF ALL RESPONSES TO PHQ QUESTIONS 1-9: 7

## 2020-03-03 ENCOUNTER — COMMUNICATION - HEALTHEAST (OUTPATIENT)
Dept: FAMILY MEDICINE | Facility: CLINIC | Age: 75
End: 2020-03-03

## 2020-03-09 ENCOUNTER — COMMUNICATION - HEALTHEAST (OUTPATIENT)
Dept: FAMILY MEDICINE | Facility: CLINIC | Age: 75
End: 2020-03-09

## 2020-03-11 ENCOUNTER — OFFICE VISIT - HEALTHEAST (OUTPATIENT)
Dept: VASCULAR SURGERY | Facility: CLINIC | Age: 75
End: 2020-03-11

## 2020-03-11 ENCOUNTER — RECORDS - HEALTHEAST (OUTPATIENT)
Dept: VASCULAR ULTRASOUND | Facility: CLINIC | Age: 75
End: 2020-03-11

## 2020-03-11 DIAGNOSIS — I83.813 VARICOSE VEINS OF BILATERAL LOWER EXTREMITIES WITH PAIN: ICD-10-CM

## 2020-03-11 DIAGNOSIS — I87.2 VENOUS INSUFFICIENCY (CHRONIC) (PERIPHERAL): ICD-10-CM

## 2020-03-11 DIAGNOSIS — I87.2 VENOUS INSUFFICIENCY OF BOTH LOWER EXTREMITIES: ICD-10-CM

## 2020-03-11 ASSESSMENT — MIFFLIN-ST. JEOR: SCORE: 1428.93

## 2020-03-23 ENCOUNTER — OFFICE VISIT - HEALTHEAST (OUTPATIENT)
Dept: FAMILY MEDICINE | Facility: CLINIC | Age: 75
End: 2020-03-23

## 2020-03-23 DIAGNOSIS — R25.2 LEG CRAMPS: ICD-10-CM

## 2020-03-23 DIAGNOSIS — M79.2 NEUROPATHIC PAIN, LEG, LEFT: ICD-10-CM

## 2020-04-11 ENCOUNTER — COMMUNICATION - HEALTHEAST (OUTPATIENT)
Dept: FAMILY MEDICINE | Facility: CLINIC | Age: 75
End: 2020-04-11

## 2020-04-11 DIAGNOSIS — I10 ESSENTIAL HYPERTENSION: ICD-10-CM

## 2020-04-15 ENCOUNTER — COMMUNICATION - HEALTHEAST (OUTPATIENT)
Dept: FAMILY MEDICINE | Facility: CLINIC | Age: 75
End: 2020-04-15

## 2020-04-15 DIAGNOSIS — M79.2 NEUROPATHIC PAIN, LEG, LEFT: ICD-10-CM

## 2020-04-22 ENCOUNTER — COMMUNICATION - HEALTHEAST (OUTPATIENT)
Dept: FAMILY MEDICINE | Facility: CLINIC | Age: 75
End: 2020-04-22

## 2020-05-21 ENCOUNTER — HOSPITAL ENCOUNTER (OUTPATIENT)
Dept: MAMMOGRAPHY | Facility: CLINIC | Age: 75
Discharge: HOME | End: 2020-05-21
Attending: FAMILY MEDICINE

## 2020-05-21 DIAGNOSIS — Z12.31 VISIT FOR SCREENING MAMMOGRAM: ICD-10-CM

## 2020-07-06 ENCOUNTER — COMMUNICATION - HEALTHEAST (OUTPATIENT)
Dept: FAMILY MEDICINE | Facility: CLINIC | Age: 75
End: 2020-07-06

## 2020-07-06 DIAGNOSIS — I10 ESSENTIAL HYPERTENSION: ICD-10-CM

## 2020-07-14 ENCOUNTER — OFFICE VISIT - HEALTHEAST (OUTPATIENT)
Dept: FAMILY MEDICINE | Facility: CLINIC | Age: 75
End: 2020-07-14

## 2020-07-14 DIAGNOSIS — M79.89 SWELLING OF BOTH LOWER EXTREMITIES: ICD-10-CM

## 2020-07-14 DIAGNOSIS — M81.0 OSTEOPOROSIS, UNSPECIFIED OSTEOPOROSIS TYPE, UNSPECIFIED PATHOLOGICAL FRACTURE PRESENCE: ICD-10-CM

## 2020-07-14 DIAGNOSIS — M79.671 PAIN IN BOTH FEET: ICD-10-CM

## 2020-07-14 DIAGNOSIS — M79.672 PAIN IN BOTH FEET: ICD-10-CM

## 2020-07-14 DIAGNOSIS — Z11.59 ENCOUNTER FOR HEPATITIS C SCREENING TEST FOR LOW RISK PATIENT: ICD-10-CM

## 2020-07-14 DIAGNOSIS — K21.00 GASTRO-ESOPHAGEAL REFLUX DISEASE WITH ESOPHAGITIS: ICD-10-CM

## 2020-07-14 DIAGNOSIS — D64.9 ANEMIA, UNSPECIFIED TYPE: ICD-10-CM

## 2020-07-14 DIAGNOSIS — I10 ESSENTIAL HYPERTENSION: ICD-10-CM

## 2020-07-14 DIAGNOSIS — R25.2 CRAMP OF LIMB: ICD-10-CM

## 2020-07-14 DIAGNOSIS — M48.00 SPINAL STENOSIS, UNSPECIFIED SPINAL REGION: ICD-10-CM

## 2020-07-14 LAB
ALBUMIN SERPL-MCNC: 4.2 G/DL (ref 3.5–5)
ALP SERPL-CCNC: 107 U/L (ref 45–120)
ALT SERPL W P-5'-P-CCNC: 13 U/L (ref 0–45)
ANION GAP SERPL CALCULATED.3IONS-SCNC: 13 MMOL/L (ref 5–18)
AST SERPL W P-5'-P-CCNC: 16 U/L (ref 0–40)
BILIRUB SERPL-MCNC: 0.5 MG/DL (ref 0–1)
BUN SERPL-MCNC: 28 MG/DL (ref 8–28)
CALCIUM SERPL-MCNC: 9.6 MG/DL (ref 8.5–10.5)
CHLORIDE BLD-SCNC: 103 MMOL/L (ref 98–107)
CO2 SERPL-SCNC: 22 MMOL/L (ref 22–31)
CREAT SERPL-MCNC: 1.21 MG/DL (ref 0.6–1.1)
FERRITIN SERPL-MCNC: 39 NG/ML (ref 10–130)
GFR SERPL CREATININE-BSD FRML MDRD: 44 ML/MIN/1.73M2
GLUCOSE BLD-MCNC: 113 MG/DL (ref 70–125)
IRON SATN MFR SERPL: 18 % (ref 20–50)
IRON SERPL-MCNC: 70 UG/DL (ref 42–175)
MAGNESIUM SERPL-MCNC: 2.2 MG/DL (ref 1.8–2.6)
POTASSIUM BLD-SCNC: 4.2 MMOL/L (ref 3.5–5)
PROT SERPL-MCNC: 7.3 G/DL (ref 6–8)
PTH-INTACT SERPL-MCNC: 69 PG/ML (ref 10–86)
SODIUM SERPL-SCNC: 138 MMOL/L (ref 136–145)
TIBC SERPL-MCNC: 386 UG/DL (ref 313–563)
TRANSFERRIN SERPL-MCNC: 309 MG/DL (ref 212–360)
VIT B12 SERPL-MCNC: 756 PG/ML (ref 213–816)

## 2020-07-14 ASSESSMENT — MIFFLIN-ST. JEOR: SCORE: 1439.83

## 2020-07-14 NOTE — ASSESSMENT & PLAN NOTE
"Lisinopril HCTZ   20-25 I daily   Amlodipine 5 mg   No Side effects   Cramping ++  Feet and legs   Cough No  Light headed No  Leg swelling Yes Swell \"all the time\"    Amlodipine >>> Swelling  HCTZ  >>> Cramping   "

## 2020-07-14 NOTE — ASSESSMENT & PLAN NOTE
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

## 2020-07-15 LAB
25(OH)D3 SERPL-MCNC: 28.5 NG/ML (ref 30–80)
25(OH)D3 SERPL-MCNC: 28.5 NG/ML (ref 30–80)
BASOPHILS # BLD AUTO: 0.1 THOU/UL (ref 0–0.2)
BASOPHILS NFR BLD AUTO: 1 % (ref 0–2)
EOSINOPHIL COUNT (ABSOLUTE): 0.2 THOU/UL (ref 0–0.4)
EOSINOPHIL NFR BLD AUTO: 3 % (ref 0–6)
ERYTHROCYTE [DISTWIDTH] IN BLOOD BY AUTOMATED COUNT: 13.3 % (ref 11–14.5)
HCT VFR BLD AUTO: 39.2 % (ref 35–47)
HCV AB SERPL QL IA: NEGATIVE
HGB BLD-MCNC: 12.3 G/DL (ref 12–16)
LAB AP CHARGES (HE HISTORICAL CONVERSION): NORMAL
LYMPHOCYTES # BLD AUTO: 1.5 THOU/UL (ref 0.8–4.4)
LYMPHOCYTES NFR BLD AUTO: 19 % (ref 20–40)
MCH RBC QN AUTO: 29.4 PG (ref 27–34)
MCHC RBC AUTO-ENTMCNC: 31.4 G/DL (ref 32–36)
MCV RBC AUTO: 94 FL (ref 80–100)
MONOCYTES # BLD AUTO: 0.3 THOU/UL (ref 0–0.9)
MONOCYTES NFR BLD AUTO: 4 % (ref 2–10)
OVALOCYTES: ABNORMAL
PATH REPORT.COMMENTS IMP SPEC: NORMAL
PATH REPORT.COMMENTS IMP SPEC: NORMAL
PATH REPORT.FINAL DX SPEC: NORMAL
PATH REPORT.MICROSCOPIC SPEC OTHER STN: ABNORMAL
PATH REPORT.MICROSCOPIC SPEC OTHER STN: NORMAL
PATH REPORT.RELEVANT HX SPEC: NORMAL
PLAT MORPH BLD: NORMAL
PLATELET # BLD AUTO: 287 THOU/UL (ref 140–440)
PMV BLD AUTO: 11.6 FL (ref 8.5–12.5)
POLYCHROMASIA BLD QL SMEAR: ABNORMAL
RBC # BLD AUTO: 4.18 MILL/UL (ref 3.8–5.4)
REACTIVE LYMPHS: ABNORMAL
STFR SERPL-MCNC: 2.9 MG/L (ref 1.9–4.4)
TOTAL NEUTROPHILS-ABS(DIFF): 6 THOU/UL (ref 2–7.7)
TOTAL NEUTROPHILS-REL(DIFF): 74 % (ref 50–70)
WBC: 8.1 THOU/UL (ref 4–11)

## 2020-07-20 LAB
ALBUMIN PERCENT: 62.6 % (ref 51–67)
ALBUMIN SERPL ELPH-MCNC: 4.6 G/DL (ref 3.2–4.7)
ALPHA 1 PERCENT: 2.6 % (ref 2–4)
ALPHA 2 PERCENT: 12.6 % (ref 5–13)
ALPHA1 GLOB SERPL ELPH-MCNC: 0.2 G/DL (ref 0.1–0.3)
ALPHA2 GLOB SERPL ELPH-MCNC: 0.9 G/DL (ref 0.4–0.9)
B-GLOBULIN SERPL ELPH-MCNC: 0.8 G/DL (ref 0.7–1.2)
BETA PERCENT: 10.3 % (ref 10–17)
GAMMA GLOB SERPL ELPH-MCNC: 0.9 G/DL (ref 0.6–1.4)
GAMMA GLOBULIN PERCENT: 11.9 % (ref 9–20)
PATH ICD:: NORMAL
PROT PATTERN SERPL ELPH-IMP: NORMAL
PROT SERPL-MCNC: 7.4 G/DL (ref 6–8)
REVIEWING PATHOLOGIST: NORMAL

## 2020-07-31 ENCOUNTER — OFFICE VISIT - HEALTHEAST (OUTPATIENT)
Dept: INTERNAL MEDICINE | Facility: CLINIC | Age: 75
End: 2020-07-31

## 2020-07-31 DIAGNOSIS — N28.9 RENAL INSUFFICIENCY: ICD-10-CM

## 2020-07-31 DIAGNOSIS — K21.00 GASTRO-ESOPHAGEAL REFLUX DISEASE WITH ESOPHAGITIS: ICD-10-CM

## 2020-07-31 DIAGNOSIS — M81.0 AGE-RELATED OSTEOPOROSIS WITHOUT CURRENT PATHOLOGICAL FRACTURE: ICD-10-CM

## 2020-07-31 DIAGNOSIS — Z92.29 PERSONAL HISTORY OF OTHER DRUG THERAPY: ICD-10-CM

## 2020-07-31 ASSESSMENT — MIFFLIN-ST. JEOR: SCORE: 1457.97

## 2020-08-05 ENCOUNTER — COMMUNICATION - HEALTHEAST (OUTPATIENT)
Dept: FAMILY MEDICINE | Facility: CLINIC | Age: 75
End: 2020-08-05

## 2020-08-05 DIAGNOSIS — I10 ESSENTIAL HYPERTENSION: ICD-10-CM

## 2020-08-13 ENCOUNTER — COMMUNICATION - HEALTHEAST (OUTPATIENT)
Dept: INTERNAL MEDICINE | Facility: CLINIC | Age: 75
End: 2020-08-13

## 2020-08-21 ENCOUNTER — COMMUNICATION - HEALTHEAST (OUTPATIENT)
Dept: GENERAL RADIOLOGY | Facility: CLINIC | Age: 75
End: 2020-08-21

## 2020-08-25 ENCOUNTER — AMBULATORY - HEALTHEAST (OUTPATIENT)
Dept: NURSING | Facility: CLINIC | Age: 75
End: 2020-08-25

## 2020-09-21 ENCOUNTER — RECORDS - HEALTHEAST (OUTPATIENT)
Dept: ADMINISTRATIVE | Facility: OTHER | Age: 75
End: 2020-09-21

## 2020-10-02 ENCOUNTER — COMMUNICATION - HEALTHEAST (OUTPATIENT)
Dept: FAMILY MEDICINE | Facility: CLINIC | Age: 75
End: 2020-10-02

## 2020-10-02 DIAGNOSIS — I10 ESSENTIAL HYPERTENSION: ICD-10-CM

## 2020-10-06 ENCOUNTER — RECORDS - HEALTHEAST (OUTPATIENT)
Dept: ADMINISTRATIVE | Facility: OTHER | Age: 75
End: 2020-10-06

## 2020-10-15 ENCOUNTER — COMMUNICATION - HEALTHEAST (OUTPATIENT)
Dept: FAMILY MEDICINE | Facility: CLINIC | Age: 75
End: 2020-10-15

## 2020-10-20 ENCOUNTER — OFFICE VISIT - HEALTHEAST (OUTPATIENT)
Dept: FAMILY MEDICINE | Facility: CLINIC | Age: 75
End: 2020-10-20

## 2020-10-20 DIAGNOSIS — M79.672 PAIN IN BOTH FEET: ICD-10-CM

## 2020-10-20 DIAGNOSIS — M17.31 POST-TRAUMATIC OSTEOARTHRITIS OF RIGHT KNEE: ICD-10-CM

## 2020-10-20 DIAGNOSIS — E66.01 MORBID OBESITY (H): ICD-10-CM

## 2020-10-20 DIAGNOSIS — M19.071 PRIMARY OSTEOARTHRITIS OF RIGHT FOOT: ICD-10-CM

## 2020-10-20 DIAGNOSIS — M17.10 PRIMARY OSTEOARTHRITIS OF KNEE, UNSPECIFIED LATERALITY: ICD-10-CM

## 2020-10-20 DIAGNOSIS — N18.32 STAGE 3B CHRONIC KIDNEY DISEASE (H): ICD-10-CM

## 2020-10-20 DIAGNOSIS — M54.9 UPPER BACK PAIN: ICD-10-CM

## 2020-10-20 DIAGNOSIS — M79.671 PAIN IN BOTH FEET: ICD-10-CM

## 2020-10-20 DIAGNOSIS — Z23 NEED FOR VACCINATION: ICD-10-CM

## 2020-10-20 ASSESSMENT — MIFFLIN-ST. JEOR: SCORE: 1467.04

## 2020-10-20 ASSESSMENT — PATIENT HEALTH QUESTIONNAIRE - PHQ9: SUM OF ALL RESPONSES TO PHQ QUESTIONS 1-9: 4

## 2020-10-20 NOTE — ASSESSMENT & PLAN NOTE
"Foot surgeon at [facility]  Orthotics help     Same both sides    Ortho   Said \"arthritis \"    Tops of feet   Toes go numb   Gabapentin \"no help\"  No side effects   Ortho \"secondary thing for numbness\"      Aleve \"pound them down\"  Celebrex 200 mg two times a day  Acetaminophen  No help       "

## 2020-11-25 ENCOUNTER — RECORDS - HEALTHEAST (OUTPATIENT)
Dept: ADMINISTRATIVE | Facility: OTHER | Age: 75
End: 2020-11-25

## 2021-02-10 ENCOUNTER — RECORDS - HEALTHEAST (OUTPATIENT)
Dept: ADMINISTRATIVE | Facility: OTHER | Age: 76
End: 2021-02-10

## 2021-02-19 ENCOUNTER — COMMUNICATION - HEALTHEAST (OUTPATIENT)
Dept: INTERNAL MEDICINE | Facility: CLINIC | Age: 76
End: 2021-02-19

## 2021-02-19 DIAGNOSIS — Z92.29 PERSONAL HISTORY OF OTHER DRUG THERAPY: ICD-10-CM

## 2021-02-19 DIAGNOSIS — M81.0 AGE-RELATED OSTEOPOROSIS WITHOUT CURRENT PATHOLOGICAL FRACTURE: ICD-10-CM

## 2021-02-23 ENCOUNTER — COMMUNICATION - HEALTHEAST (OUTPATIENT)
Dept: INTERNAL MEDICINE | Facility: CLINIC | Age: 76
End: 2021-02-23

## 2021-02-26 ENCOUNTER — AMBULATORY - HEALTHEAST (OUTPATIENT)
Dept: NURSING | Facility: CLINIC | Age: 76
End: 2021-02-26

## 2021-03-17 ENCOUNTER — COMMUNICATION - HEALTHEAST (OUTPATIENT)
Dept: FAMILY MEDICINE | Facility: CLINIC | Age: 76
End: 2021-03-17

## 2021-03-17 DIAGNOSIS — I10 ESSENTIAL HYPERTENSION: ICD-10-CM

## 2021-03-24 ENCOUNTER — RECORDS - HEALTHEAST (OUTPATIENT)
Dept: ADMINISTRATIVE | Facility: OTHER | Age: 76
End: 2021-03-24

## 2021-04-09 ENCOUNTER — OFFICE VISIT - HEALTHEAST (OUTPATIENT)
Dept: FAMILY MEDICINE | Facility: CLINIC | Age: 76
End: 2021-04-09

## 2021-04-09 DIAGNOSIS — J40 BRONCHITIS: ICD-10-CM

## 2021-04-09 DIAGNOSIS — H66.90 SUBACUTE OTITIS MEDIA, UNSPECIFIED OTITIS MEDIA TYPE: ICD-10-CM

## 2021-04-20 ENCOUNTER — COMMUNICATION - HEALTHEAST (OUTPATIENT)
Dept: FAMILY MEDICINE | Facility: CLINIC | Age: 76
End: 2021-04-20

## 2021-04-30 ENCOUNTER — RECORDS - HEALTHEAST (OUTPATIENT)
Dept: GENERAL RADIOLOGY | Facility: CLINIC | Age: 76
End: 2021-04-30

## 2021-04-30 ENCOUNTER — OFFICE VISIT - HEALTHEAST (OUTPATIENT)
Dept: FAMILY MEDICINE | Facility: CLINIC | Age: 76
End: 2021-04-30

## 2021-04-30 DIAGNOSIS — R06.2 WHEEZING: ICD-10-CM

## 2021-04-30 DIAGNOSIS — N18.32 STAGE 3B CHRONIC KIDNEY DISEASE (H): ICD-10-CM

## 2021-04-30 DIAGNOSIS — I10 ESSENTIAL HYPERTENSION: ICD-10-CM

## 2021-04-30 DIAGNOSIS — E66.01 MORBID OBESITY (H): ICD-10-CM

## 2021-04-30 DIAGNOSIS — Z20.822 EXPOSURE TO COVID-19 VIRUS: ICD-10-CM

## 2021-04-30 LAB
ANION GAP SERPL CALCULATED.3IONS-SCNC: 10 MMOL/L (ref 5–18)
BUN SERPL-MCNC: 26 MG/DL (ref 8–28)
CALCIUM SERPL-MCNC: 9.1 MG/DL (ref 8.5–10.5)
CHLORIDE BLD-SCNC: 106 MMOL/L (ref 98–107)
CO2 SERPL-SCNC: 24 MMOL/L (ref 22–31)
CREAT SERPL-MCNC: 0.94 MG/DL (ref 0.6–1.1)
GFR SERPL CREATININE-BSD FRML MDRD: 58 ML/MIN/1.73M2
GLUCOSE BLD-MCNC: 92 MG/DL (ref 70–125)
POTASSIUM BLD-SCNC: 4.4 MMOL/L (ref 3.5–5)
SODIUM SERPL-SCNC: 140 MMOL/L (ref 136–145)

## 2021-04-30 RX ORDER — VALSARTAN 160 MG/1
160 TABLET ORAL DAILY
Qty: 90 TABLET | Refills: 3 | Status: SHIPPED | OUTPATIENT
Start: 2021-04-30 | End: 2021-09-24

## 2021-04-30 ASSESSMENT — MIFFLIN-ST. JEOR: SCORE: 1471.58

## 2021-05-03 ENCOUNTER — AMBULATORY - HEALTHEAST (OUTPATIENT)
Dept: FAMILY MEDICINE | Facility: CLINIC | Age: 76
End: 2021-05-03

## 2021-05-04 ENCOUNTER — COMMUNICATION - HEALTHEAST (OUTPATIENT)
Dept: SCHEDULING | Facility: CLINIC | Age: 76
End: 2021-05-04

## 2021-05-22 ENCOUNTER — COMMUNICATION - HEALTHEAST (OUTPATIENT)
Dept: FAMILY MEDICINE | Facility: CLINIC | Age: 76
End: 2021-05-22

## 2021-05-22 DIAGNOSIS — R06.2 WHEEZING: ICD-10-CM

## 2021-05-24 RX ORDER — BUDESONIDE AND FORMOTEROL FUMARATE DIHYDRATE 160; 4.5 UG/1; UG/1
AEROSOL RESPIRATORY (INHALATION)
Qty: 10.2 INHALER | Refills: 1 | Status: SHIPPED | OUTPATIENT
Start: 2021-05-24 | End: 2021-07-12

## 2021-05-27 ASSESSMENT — PATIENT HEALTH QUESTIONNAIRE - PHQ9
SUM OF ALL RESPONSES TO PHQ QUESTIONS 1-9: 7
SUM OF ALL RESPONSES TO PHQ QUESTIONS 1-9: 4

## 2021-05-28 ENCOUNTER — RECORDS - HEALTHEAST (OUTPATIENT)
Dept: ADMINISTRATIVE | Facility: CLINIC | Age: 76
End: 2021-05-28

## 2021-05-28 ASSESSMENT — ANXIETY QUESTIONNAIRES: GAD7 TOTAL SCORE: 1

## 2021-05-28 NOTE — PROGRESS NOTES
Assessment:     Diagnoses and all orders for this visit:    Chronic bilateral low back pain with bilateral sciatica  -     OPS TFESI Lumbar Bilateral; Future; Expected date: 05/15/2019    Left lumbar radiculopathy    Lumbar radiculitis  -     OPS TFESI Lumbar Bilateral; Future; Expected date: 05/15/2019    Spondylolisthesis, lumbar region    Lumbar degenerative disc disease  -     OPS TFESI Lumbar Bilateral; Future; Expected date: 05/15/2019    Spinal stenosis of lumbar region without neurogenic claudication    Pain in thoracic spine       Stacey Allen is a 73 y.o. y.o. female with past medical history significant for hypercholesterolemia, chronic reflux esophagitis, epistaxis, elevated liver enzymes, osteoporosis, who presents today for follow-up regarding:    -Progressive worsening bilateral low back pain lumbosacral junction with bilateral lower extremity L5 and S1 dermatomal pattern, currently 60% of the pain is on the left and 40% on the right, patient is gotten relief with previous left L5-S1 TFESI.    -Progressive worsening generalized thoracic spine pain, this is likely compensatory however.    Patient is strong on exam and neurologically intact.     Plan:     A shared decision making plan was used. The patient's values and choices were respected. Prior medical records from 2/4/19 were reviewed today. The following represents what was discussed and decided upon by the provider and the patient.        -DIAGNOSTIC TESTS: Images were personally reviewed and interpreted.   --If no benefit with injection or any concerning symptoms with bowel and bladder I would recommend updated lumbar spine imaging and updated flexion extension x-ray.  --Lower extremity EMG on the left reveals mild sensory motor polyneuropathy without any radiculopathy noted.  --Flexion-extension x-ray reveals no instability.  --Lumbar spine MRI 1/9/2018 does reveal L4-5 6 mm spondylolisthesis with discogenic marrow edema, advanced facet  arthropathy with severe central canal and right greater than left lateral recess stenosis.  Moderate right and mild left foraminal stenosis.  L5-S1 moderate to advanced disc height loss with no central stenosis, mild bilateral foraminal stenosis not noted on radiology report however).  --Lumbar spine x-ray 12/27/2017 reveals 12 mm anterolisthesis L4-5 and 6 mm L5-S1.  Moderate to marked disc height loss at L4-5 and marked at L5-S1 with moderate to marked lumbar facet arthropathy.  --Thoracic spine x-ray reveals exaggeration of thoracic kyphosis     -INTERVENTIONS: Ordered repeat bilateral L5-S1 transforaminal epidural steroid injection see if we get further relief of her bilateral lumbar radiculitis L5 dermatomal pattern.  Her pain is constant currently and 60% on the left, 40% on the right.  She has gotten relief with previous left L5-S1 TF RY, and minimal to no lasting relief previously with bilateral L4-5 TF RY.  Patient does have L4-5 spondylolisthesis as well as degenerative disc changes at L5-S1 with bilateral foraminal stenosis at both levels.    -MEDICATIONS: No changes in medications today advised patient continue Aleve as needed.  Discussed side effects of medications and proper use. Patient verbalized understanding.    -PHYSICAL THERAPY: Encouraged patient continue with home exercises from prior physical therapy sessions which she is doing diligently at home.  Discussed the importance of core strengthening, ROM, stretching exercises with the patient and how each of these entities is important in decreasing pain.  Explained to the patient that the purpose of physical therapy is to teach the patient a home exercise program.  These exercises need to be performed every day in order to decrease pain and prevent future occurrences of pain.        -PATIENT EDUCATION:  25 minutes of total visit time was spent face to face with the patient today, 60 % of the visit was spent on counseling, education, and  coordinating care.   -5 minutes spent outside of visit time, non-face-to-face time, reviewing chart.    -FOLLOW UP: Follow-up for injection with Dr. Castellanos in 2 weeks postinjection.  Advised to contact clinic if symptoms worsen or change.    Subjective:     Stacey Allen is a 73 y.o. female who presents today for follow-up regarding progressive worsening bilateral low back pain lumbosacral junction that radiates to the posterior lateral thigh and posterior lateral calf with associated numbness and tingling and perceived weakness it is constant however most bothersome with standing and walking, does improve with lying flat, does not go away with sitting however.  Patient reports that her upper back is also been more bothersome and she starting to have some generalized swelling in bilateral lower extremities left greater than right at this time as well.  Patient again denies any episodes of her legs giving out on her but does feel weaker in her lower extremities in general.  Patient denies bowel or bladder loss of control but she does have more urgency at nighttime and feels like she is going to the bathroom more frequently.    Patient also endorses worsening generalized thoracic spine pain entire thoracic spine when her back pain is bothersome.     -Treatment to Date: No prior spinal surgery or spinal injection.  Physical therapy ×5 sessions with no benefit.  Traction aggravated her pain.  Physical therapy [facility] x1 session eval 12/20/2018.     Bilateral L4-5 TF RY 1/22/2018 with 80% relief of bilateral low back and radicular pain.  Still residual LEFT radiculitis noted.  Left L5-S1 TF RY 4/2/2018 with 85% relief of left leg pain.  Bilateral L4-5 TF RY 12/26/2018 with significant relief times 2 days only, then minimal relief of low back and bilateral leg pain, most significant left leg pain remains.  Preprocedure pain 5/10, post 3/10.  Left L5-S1 TF RY 1/21/2019 with 90% relief.  Preprocedure pain  8/10, post 6/10.      -Medications:  Over-the-counter Aleve, as needed at this point.  Gabapentin 100 mg 2-0-2 with some dizzines loopiness s/ side effec, however tolerable.  Patient has weaned off at this time.    Patient Active Problem List   Diagnosis     Hypercholesterolemia     Decrease In Height     Chronic Reflux Esophagitis     Epistaxis     Benign Polyps Of The Large Intestine     Bartholin Gland Cyst     Elevated liver enzymes     Osteoporosis     Essential hypertension     Benign neoplasm of duodenum, jejunum, and ileum     Diaphragmatic hernia     Diarrhea     Dietary counseling and surveillance     Diverticular disease of colon     Dysthymic disorder     Special screening for malignant neoplasms, colon     Esophageal reflux     Family history of malignant neoplasm of colon     Family history of malignant neoplasm of digestive organs     History of colonic polyps     Neoplasm of uncertain behavior of stomach, intestines, and rectum     Osteoarthritis     Colorectal polyps     RUQ abdominal pain       Current Outpatient Medications on File Prior to Encounter   Medication Sig Dispense Refill     amLODIPine (NORVASC) 5 MG tablet Take 1 tablet (5 mg total) by mouth daily. 90 tablet 3     lisinopril-hydrochlorothiazide (ZESTORETIC) 20-25 mg per tablet Take 1 tablet by mouth daily. In the AM for high Blood Pressure 90 tablet 3     MULTIVIT-MINERALS/FERROUS FUM (MULTI VITAMIN ORAL) Take 1 tablet by mouth daily.       naproxen sodium (ALEVE) 220 MG tablet Take 1-2 tablets by mouth every 6 (six) hours as needed.       pantoprazole (PROTONIX) 40 MG tablet Take 40 mg by mouth daily.       No current facility-administered medications on file prior to encounter.        No Known Allergies    Past Medical History:   Diagnosis Date     Hyperlipidemia      Hypertension      Osteoporosis      Stomach ulcer         Review of Systems  ROS: Positive for numbness and tingling, weakness.  Specifically negative for  bowel/bladder dysfunction, balance changes, headache, dizziness, foot drop, fevers, chills, appetite changes, nausea/vomiting, unexplained weight loss. Otherwise 13 systems reviewed are negative. Please see the patient's intake questionnaire from today for details.    Reviewed Social, Family, Past Medical and Past Surgical history with patient, no significant changes noted since prior visit.     Objective:     /68 (Patient Site: Right Arm, Patient Position: Sitting)   Pulse 87   Wt 197 lb (89.4 kg)   LMP 07/08/1992   SpO2 96%   BMI 32.78 kg/m      PHYSICAL EXAMINATION:    --CONSTITUTIONAL: Well developed, well nourished, healthy appearing individual.  --PSYCHIATRIC: Appropriate mood and affect. No difficulty interacting due to temper, social withdrawal, or memory issues.  --SKIN: Lumbar region is dry and intact.   --RESPIRATORY: Normal rhythm and effort. No abnormal accessory muscle breathing patterns noted.   --MUSCULOSKELETAL:  Normal lumbar lordosis noted, no lateral shift.  --GROSS MOTOR: Easily arises from a seated position. Gait is non-antalgic  --LUMBAR SPINE:  Inspection reveals no evidence of deformity. Range of motion is not limited in lumbar flexion, extension, lateral rotation. No tenderness to palpation lumbar spine. Straight leg raising in the supine position is negative to radicular pain. Sciatic notch non-tender.   --SACROILIAC JOINT: One Finger point test negative.  --LOWER EXTREMITY MOTOR TESTING:  Plantar flexion left 5/5, right 5/5   Dorsiflexion left 5/5, right 5/5   Great toe MTP extension left 5/5, right 5/5  Knee flexion left 5/5, right 5/5  Knee extension left 5/5, right 5/5   Hip flexion left 5/5, right 5/5  Hip abduction left 5/5, right 5/5  Hip adduction left 5/5, right 5/5   --HIPS: Full range of motion bilaterally.   --NEUROLOGIC: Bilateral patellar and achilles reflexes are physiologic and symmetric. Sensation to light touch is intact in the bilateral L4, L5, and S1  dermatomes.  1+ nonpitting edema left lower extremity generalized, trace right lower extremity edema.  Negative Homans sign.    RESULTS:   Imaging: Lumbar spine imaging was reviewed today. The images were shown to the patient and the findings were explained using a spine model.      XR LUMBAR SPINE FLEX AND EXT 2 OR 3 VWS  2/19/2018   INDICATION: L4-L5 spondylolisthesis. Evaluate stability.  COMPARISON: MRI lumbar spine 01/09/2018.  FINDINGS: Last fully developed interspace is designated L5-S1 as on prior MRI. With this numbering system there is approximately 15 mm anterolisthesis of L4 with respect to L5 which is very similar in both flexion and extension. Severe interspace   narrowing is seen at L4-L5 and L5-S1 with associated endplate sclerosis. More mild posterior interspace narrowing at L3-L4. Vertebral body heights are preserved.        Mr Lumbar Spine Without Contrast  Result Date: 1/9/2018  INDICATION: Lower back pain. Bilateral L5-S1 radiculitis, left worse than right. Spondylolisthesis. TECHNIQUE: Unenhanced. COMPARISON: Plain films 12/27/2017.   FINDINGS: Five lumbar vertebrae are assumed. Mild convex left lumbar curvature.   T11-T12: Disc dehydration. Slight disc space loss. Mild interbody spurring. Small shallow central disc protrusion. Facet joints negative. No significant central canal or foraminal stenosis.   T12-L1: Disc dehydration. Mild disc space loss. Small to moderate-sized right paracentral disc protrusion mildly effaces the ventral thecal sac on the right. Facet joints negative. No significant central canal stenosis. No foraminal stenosis.   L1-L2: Disc dehydration. Mild disc space loss. Annular bulge and small shallow central to right paracentral disc protrusion. Facet joints negative. No central canal or foraminal stenosis.   L2-L3: Normal disc signal and height. Facet joints negative. No significant central canal or foraminal stenosis.   L3-L4: Disc dehydration. Mild disc space loss. Mild  annular bulge. Mild degenerative facet arthropathy. Ligamentum flavum thickening. No central canal stenosis. No foraminal stenosis.   L4-L5: 6 mm of anterolisthesis of L4 on L5. Disc dehydration. Moderate disc space loss. Mild discogenic marrow edema. Mild annular bulge. Advanced degenerative facet arthropathy. Ligamentum flavum thickening. Severe central canal stenosis and right worse  than left lateral recess stenosis. Moderate right and mild left foraminal stenosis.   L5-S1: Disc dehydration. Moderate to advanced disc space loss. Mild interbody spurring. Annular bulge. Mild degenerative facet arthropathy. No significant central canal stenosis. No foraminal stenosis.   Conus and intraspinal contents otherwise negative.   No significant marrow signal abnormality.   No significant paravertebral soft tissue abnormality. Colonic diverticulosis. Hiatal hernia.   CONCLUSION:   1.  Degenerative changes lumbar spine as described above.   2.  Low-grade degenerative spondylolisthesis L4 on L5.   3.  At L4-L5, there is severe central canal stenosis, right worse than left lateral recess stenosis, and moderate right and mild left foraminal stenosis due to the degenerative changes and spondylolisthesis.        Xr Thoracic Spine 2 Vws  Result Date: 12/28/2017  INDICATION: Age-related osteoporosis without current pathological fracture. COMPARISON: None. FINDINGS: Mild right convexity mid thoracic curvature and exaggeration of the normal thoracic kyphosis. No definite acute compression fracture deformity though the evaluation of the upper thoracic levels is limited by overlying soft tissue and shoulder artifact. Moderate midthoracic degenerative disc disease. Visualized lungs are clear.           Xr Lumbar Spine 2 Or 3 Vws  Result Date: 12/28/2017  INDICATION: Sciatica, left side. COMPARISON: None. FINDINGS: Nomenclature based on 5 lumbar-type vertebral bodies. 12 mm anterolisthesis of L4 on L5 and 6 mm anterolisthesis L5 on S1.  Alignment is otherwise normal. Vertebral body heights are maintained. Moderate to marked loss of disc space height at L4-L5 and marked changes at L5-S1. Moderate to marked lower lumbar facet arthropathy. Mild degenerative change about the hip and sacroiliac joints.

## 2021-05-28 NOTE — PATIENT INSTRUCTIONS - HE
~Please call Nurse Navigation line [phone] with any questions or concerns about your treatment plan, if symptoms worsen and you would like to be seen urgently, or if you have problems controlling bladder and bowel function.  ~Follow Up Appointment time slots with Xochilt Blandon, CNP with [facility], are also available at the [facility] location near [facility] on the first and third THURSDAY afternoons of each month.

## 2021-05-28 NOTE — PROGRESS NOTES
"SUBJECTIVE: Stacey Allen is a 73 y.o. female with:  Chief Complaint   Patient presents with     Cough     x 1 month     Nasal Congestion   Symptoms started with rhinorrhea / congestion and cough a few weeks ago.  She had low grade fever.  Now she has cough with laryngitis.  No sore throat / fever/ shortness of breath / wheezing or chest pain.  Cough is nagging. Her daughter wanted her to be seen.  She does work as .     OBJECTIVE: /58 (Patient Site: Right Arm, Patient Position: Sitting, Cuff Size: Adult Regular)   Pulse 93   Temp 97.9  F (36.6  C)   Ht 5' 5\" (1.651 m)   Wt 197 lb (89.4 kg)   LMP 07/08/1992   BMI 32.78 kg/m     No acute distress.  HEENT: Head is atraumatic and/normocephalic.  PERRL.  Conjunctiva clear.  Tympanic membranes grey with normal landmarks and normal light reflexes.  No nasal discharge.  Oropharynx is pink and moist.  Sinuses nontender.  Neck: Supple.  No lymphadenopathy or thyromegaly.  Lungs: Clear to auscultation.  No wheezing, retractions, or tachypnea.  Heart: RRR. S1 and S2 normal.  No murmurs, rubs, or gallops.  Neuro: Awake, alert, oriented x 3.  Normal strength and tone.  Normal gait.     Stacey was seen today for cough and nasal congestion.    Diagnoses and all orders for this visit:    Viral upper respiratory tract infection with cough       Patient Instructions   Consider taking loratadine ( OTC ) for a week or two to dry up drip.    Increase fluids.    Salt water gargles.    Call if not better by next week.     Maria D Verde   "

## 2021-05-28 NOTE — PATIENT INSTRUCTIONS - HE
Consider taking loratadine ( OTC ) for a week or two to dry up drip.    Increase fluids.    Salt water gargles.    Call if not better by next week.

## 2021-05-29 NOTE — PROGRESS NOTES
Assessment:     Diagnoses and all orders for this visit:    Chronic bilateral low back pain with bilateral sciatica    Lumbar radiculitis    Spondylolisthesis, lumbar region    Spinal stenosis of lumbar region without neurogenic claudication    Lumbar degenerative disc disease       Stacey Allen is a 74 y.o. y.o. female with past medical history significant for hypercholesterolemia, chronic reflux esophagitis, epistaxis, elevated liver enzymes, osteoporosis who presents today for follow-up regarding:    -Chronic bilateral low back pain with bilateral radicular symptoms which she received 60% relief with bilateral L5-S1 TFESI.    -Lingering left paresthesias L5 dermatomal pattern.    Patient is neurologically intact on exam.    Plan:     A shared decision making plan was used. The patient's values and choices were respected. Prior medical records from 5/15/19 were reviewed today. The following represents what was discussed and decided upon by the provider and the patient.        -DIAGNOSTIC TESTS: Images were personally reviewed and interpreted.   --Lower extremity EMG on the left reveals mild sensory motor polyneuropathy without any radiculopathy noted.  --Flexion-extension x-ray reveals no instability.  --Lumbar spine MRI 1/9/2018 does reveal L4-5 6 mm spondylolisthesis with discogenic marrow edema, advanced facet arthropathy with severe central canal and right greater than left lateral recess stenosis.  Moderate right and mild left foraminal stenosis.  L5-S1 moderate to advanced disc height loss with no central stenosis, mild bilateral foraminal stenosis not noted on radiology report however).  --Lumbar spine x-ray 12/27/2017 reveals 12 mm anterolisthesis L4-5 and 6 mm L5-S1.  Moderate to marked disc height loss at L4-5 and marked at L5-S1 with moderate to marked lumbar facet arthropathy.  --Thoracic spine x-ray reveals exaggeration of thoracic kyphosis     -INTERVENTIONS: Discussed that we can repeat bilateral  L5-S1 TFESI down the road if symptoms are worsening again.  We discussed that typically for long-term management of radicular symptoms we recommend injections every 3 months and no sooner than that, patient verbalizes understanding.    -MEDICATIONS: No changes in medications, advised patient to continue Aleve as needed.  She did not tolerate gabapentin in the past.  Discussed side effects of medications and proper use. Patient verbalized understanding.    -PHYSICAL THERAPY: Advised patient continue home exercises from prior physical therapy sessions on a regular basis at this time.  Discussed the importance of core strengthening, ROM, stretching exercises with the patient and how each of these entities is important in decreasing pain.  Explained to the patient that the purpose of physical therapy is to teach the patient a home exercise program.  These exercises need to be performed every day in order to decrease pain and prevent future occurrences of pain.        -PATIENT EDUCATION:  20 minutes of total visit time was spent face to face with the patient today, 60 % of the visit was spent on counseling, education, and coordinating care.   -5 minutes spent outside of visit time, non-face-to-face time, reviewing chart.    -FOLLOW UP: Follow-up as needed  Advised to contact clinic if symptoms worsen or change.    Subjective:     Stacey Allen is a 74 y.o. female who presents today for follow-up regarding 2 weeks post bilateral L5-S1 TFESI in which she received 50% relief of her bilateral low back pain and bilateral lower extremity pain and she is doing much better at this time.  Currently her pain is a 4/10, she does have numbness and tingling into her left leg that is still bothersome however tolerable.  Ongoing lower extremity weakness, however patient denies any recent trips or falls or balance changes, denies any episodes of her leg giving out on her.  Patient denies bowel or bladder loss control.    -Treatment to  Date: No prior spinal surgery or spinal injection.  Physical therapy ×5 sessions with no benefit.  Traction aggravated her pain.  Physical therapy [facility] x1 session eval 12/20/2018.     Bilateral L4-5 TF RY 1/22/2018 with 80% relief of bilateral low back and radicular pain.  Still residual LEFT radiculitis noted.  Left L5-S1 TF RY 4/2/2018 with 85% relief of left leg pain.  Bilateral L4-5 TF RY 12/26/2018 with significant relief times 2 days only, then minimal relief of low back and bilateral leg pain, most significant left leg pain remains.  Preprocedure pain 5/10, post 3/10.  Left L5-S1 TF RY 1/21/2019 with 90% relief.  Preprocedure pain 8/10, post 6/10.  Bilateral L5-S1 TFESI 5/30/2019 with 60% relief lumbar radiculitis.  Preprocedure pain left 10/10, post 5/10.  Preprocedure pain right 5/10, post 3/10.      -Medications:  Over-the-counter Aleve, as needed at this point.  Gabapentin 100 mg 2-0-2 with some dizzines loopiness s/ side effec, however tolerable.  Patient has weaned off at this time.    Patient Active Problem List   Diagnosis     Hypercholesterolemia     Decrease In Height     Chronic Reflux Esophagitis     Epistaxis     Benign Polyps Of The Large Intestine     Bartholin Gland Cyst     Elevated liver enzymes     Osteoporosis     Essential hypertension     Benign neoplasm of duodenum, jejunum, and ileum     Diaphragmatic hernia     Diarrhea     Dietary counseling and surveillance     Diverticular disease of colon     Dysthymic disorder     Special screening for malignant neoplasms, colon     Esophageal reflux     Family history of malignant neoplasm of colon     Family history of malignant neoplasm of digestive organs     History of colonic polyps     Neoplasm of uncertain behavior of stomach, intestines, and rectum     Osteoarthritis     Colorectal polyps     RUQ abdominal pain       Current Outpatient Medications on File Prior to Encounter   Medication Sig Dispense Refill     naproxen  sodium (ALEVE) 220 MG tablet Take 1-2 tablets by mouth every 6 (six) hours as needed.       amLODIPine (NORVASC) 5 MG tablet Take 1 tablet (5 mg total) by mouth daily. 90 tablet 3     lisinopril-hydrochlorothiazide (ZESTORETIC) 20-25 mg per tablet Take 1 tablet by mouth daily. In the AM for high Blood Pressure 90 tablet 3     MULTIVIT-MINERALS/FERROUS FUM (MULTI VITAMIN ORAL) Take 1 tablet by mouth daily.       pantoprazole (PROTONIX) 40 MG tablet Take 40 mg by mouth daily.       No current facility-administered medications on file prior to encounter.        No Known Allergies    Past Medical History:   Diagnosis Date     Hyperlipidemia      Hypertension      Osteoporosis      Stomach ulcer         Review of Systems  ROS: Positive for numbness and tingling, perceived left lower extremity weakness, positive for headache.  Specifically negative for bowel/bladder dysfunction, balance changes, dizziness, foot drop, fevers, chills, appetite changes, nausea/vomiting, unexplained weight loss. Otherwise 13 systems reviewed are negative. Please see the patient's intake questionnaire from today for details.    Reviewed Social, Family, Past Medical and Past Surgical history with patient, no significant changes noted since prior visit.     Objective:     /59 (Patient Site: Right Arm, Patient Position: Sitting)   Pulse 83   Wt 192 lb (87.1 kg)   LMP 07/08/1992   SpO2 96%   BMI 31.95 kg/m      PHYSICAL EXAMINATION:    --CONSTITUTIONAL: Well developed, well nourished, healthy appearing individual.  --PSYCHIATRIC: Appropriate mood and affect. No difficulty interacting due to temper, social withdrawal, or memory issues.  --SKIN: Lumbar region is dry and intact.   --RESPIRATORY: Normal rhythm and effort. No abnormal accessory muscle breathing patterns noted.   --MUSCULOSKELETAL:  Normal lumbar lordosis noted, no lateral shift.  --GROSS MOTOR: Easily arises from a seated position. Gait is non-antalgic  --LUMBAR SPINE:   Inspection reveals no evidence of deformity.   --LOWER EXTREMITY MOTOR TESTING:  Plantar flexion left 5/5, right 5/5   Dorsiflexion left 5/5, right 5/5   Great toe MTP extension left 5/5, right 5/5  Knee flexion left 5/5, right 5/5  Knee extension left 5/5, right 5/5   Hip flexion left 5/5, right 5/5  Hip abduction left 5/5, right 5/5  Hip adduction left 5/5, right 5/5     RESULTS:   Imaging: Lumbar spine imaging was reviewed today. The images were shown to the patient and the findings were explained using a spine model.      XR LUMBAR SPINE FLEX AND EXT 2 OR 3 VWS  2/19/2018   INDICATION: L4-L5 spondylolisthesis. Evaluate stability.  COMPARISON: MRI lumbar spine 01/09/2018.  FINDINGS: Last fully developed interspace is designated L5-S1 as on prior MRI. With this numbering system there is approximately 15 mm anterolisthesis of L4 with respect to L5 which is very similar in both flexion and extension. Severe interspace   narrowing is seen at L4-L5 and L5-S1 with associated endplate sclerosis. More mild posterior interspace narrowing at L3-L4. Vertebral body heights are preserved.        Mr Lumbar Spine Without Contrast  Result Date: 1/9/2018  INDICATION: Lower back pain. Bilateral L5-S1 radiculitis, left worse than right. Spondylolisthesis. TECHNIQUE: Unenhanced. COMPARISON: Plain films 12/27/2017.   FINDINGS: Five lumbar vertebrae are assumed. Mild convex left lumbar curvature.   T11-T12: Disc dehydration. Slight disc space loss. Mild interbody spurring. Small shallow central disc protrusion. Facet joints negative. No significant central canal or foraminal stenosis.   T12-L1: Disc dehydration. Mild disc space loss. Small to moderate-sized right paracentral disc protrusion mildly effaces the ventral thecal sac on the right. Facet joints negative. No significant central canal stenosis. No foraminal stenosis.   L1-L2: Disc dehydration. Mild disc space loss. Annular bulge and small shallow central to right paracentral  disc protrusion. Facet joints negative. No central canal or foraminal stenosis.   L2-L3: Normal disc signal and height. Facet joints negative. No significant central canal or foraminal stenosis.   L3-L4: Disc dehydration. Mild disc space loss. Mild annular bulge. Mild degenerative facet arthropathy. Ligamentum flavum thickening. No central canal stenosis. No foraminal stenosis.   L4-L5: 6 mm of anterolisthesis of L4 on L5. Disc dehydration. Moderate disc space loss. Mild discogenic marrow edema. Mild annular bulge. Advanced degenerative facet arthropathy. Ligamentum flavum thickening. Severe central canal stenosis and right worse  than left lateral recess stenosis. Moderate right and mild left foraminal stenosis.   L5-S1: Disc dehydration. Moderate to advanced disc space loss. Mild interbody spurring. Annular bulge. Mild degenerative facet arthropathy. No significant central canal stenosis. No foraminal stenosis.   Conus and intraspinal contents otherwise negative.   No significant marrow signal abnormality.   No significant paravertebral soft tissue abnormality. Colonic diverticulosis. Hiatal hernia.   CONCLUSION:   1.  Degenerative changes lumbar spine as described above.   2.  Low-grade degenerative spondylolisthesis L4 on L5.   3.  At L4-L5, there is severe central canal stenosis, right worse than left lateral recess stenosis, and moderate right and mild left foraminal stenosis due to the degenerative changes and spondylolisthesis.        Xr Thoracic Spine 2 Vws  Result Date: 12/28/2017  INDICATION: Age-related osteoporosis without current pathological fracture. COMPARISON: None. FINDINGS: Mild right convexity mid thoracic curvature and exaggeration of the normal thoracic kyphosis. No definite acute compression fracture deformity though the evaluation of the upper thoracic levels is limited by overlying soft tissue and shoulder artifact. Moderate midthoracic degenerative disc disease. Visualized lungs are clear.            Xr Lumbar Spine 2 Or 3 Vws  Result Date: 12/28/2017  INDICATION: Sciatica, left side. COMPARISON: None. FINDINGS: Nomenclature based on 5 lumbar-type vertebral bodies. 12 mm anterolisthesis of L4 on L5 and 6 mm anterolisthesis L5 on S1. Alignment is otherwise normal. Vertebral body heights are maintained. Moderate to marked loss of disc space height at L4-L5 and marked changes at L5-S1. Moderate to marked lower lumbar facet arthropathy. Mild degenerative change about the hip and sacroiliac joints.

## 2021-05-29 NOTE — PATIENT INSTRUCTIONS - HE
Discussed the importance of core strengthening, ROM, stretching exercises with the patient and how each of these entities is important in decreasing pain.  Explained to the patient that the purpose of physical therapy is to teach the patient a home exercise program.  These exercises need to be performed every day in order to decrease pain and prevent future occurrences of pain.        ~Please call Nurse Navigation line [phone] with any questions or concerns about your treatment plan, if symptoms worsen and you would like to be seen urgently, or if you have problems controlling bladder and bowel function.  ~Follow Up Appointment time slots with Xochilt Blandon CNP with the [facility], are also available at the [facility] location near [facility] on the first and third THURSDAY afternoons of each month.

## 2021-05-29 NOTE — PATIENT INSTRUCTIONS - HE
DISCHARGE INSTRUCTIONS    During office hours (8:00 a.m.- 4:30 p.m.) questions or concerns may be answered  by calling Spine Navigation Nurses at  [phone].     If you experience any problems after hours  please call [phone] and you will be connected to [facility].     All Patients:    ? You may experience an increase in your symptoms for the first 2 days (It may take anywhere between 2 days- 2 weeks for the steroid to have maximum effect).    ? You may use ice on the injection site, as frequently as 20 minutes each hour if needed.    ? You may take your pain medicine.    ? You may continue taking your regular medication after your injection. If you have had a Medial Branch Block you may resume pain medication once your pain diary is completed.    ? You may shower. No swimming, tub bath or hot tub for 48 hours.  You may remove your bandaid/bandage as soon as you are home.    ? You may resume light activities, as tolerated.    ? Resume your usual diet as tolerated.    ? It is strongly advised that you do not drive for 1-3 hours post injection.    ? If you have had oral sedation:  Do not drive for 8 hours post injection.      ? If you have had IV sedation:  Do not drive for 24 hours post injection.  Do not operate hazardous machinery or make important personal/business decisions for 24 hours.      POSSIBLE STEROID SIDE EFFECTS (If steroid/cortisone was used for your procedure)    -If you experience these symptoms, it should only last for a short period      Swelling of the legs                Skin redness (flushing)       Mouth (oral) irritation     Blood sugar (glucose) levels              Sweats                      Mood changes    Headache    Sleeplessness         POSSIBLE PROCEDURE SIDE EFFECTS  -Call the [facility] if you are concerned    Increased Pain             Increased numbness/tingling        Nausea/Vomiting            Bruising/bleeding at site        Redness or swelling                                                 Difficulty walking        Weakness             Fever greater than 100.5    *In the event of a severe headache after an epidural steroid injection that is relieved by lying down, please call the [facility] to speak with a clinical staff member*

## 2021-05-31 VITALS — BODY MASS INDEX: 34.16 KG/M2 | HEIGHT: 65 IN | WEIGHT: 205 LBS

## 2021-05-31 VITALS — WEIGHT: 201 LBS | BODY MASS INDEX: 33.45 KG/M2

## 2021-05-31 VITALS — WEIGHT: 211 LBS | BODY MASS INDEX: 35.16 KG/M2 | HEIGHT: 65 IN

## 2021-05-31 VITALS — WEIGHT: 205 LBS | BODY MASS INDEX: 34.11 KG/M2

## 2021-06-01 VITALS — BODY MASS INDEX: 33.2 KG/M2 | HEIGHT: 65 IN | WEIGHT: 199.25 LBS

## 2021-06-01 VITALS — WEIGHT: 202 LBS | BODY MASS INDEX: 33.61 KG/M2

## 2021-06-02 VITALS — HEIGHT: 65 IN | BODY MASS INDEX: 33.82 KG/M2 | WEIGHT: 203 LBS

## 2021-06-02 VITALS — BODY MASS INDEX: 32.78 KG/M2 | WEIGHT: 197 LBS

## 2021-06-02 VITALS — HEIGHT: 65 IN | WEIGHT: 193 LBS | BODY MASS INDEX: 32.15 KG/M2

## 2021-06-02 VITALS — BODY MASS INDEX: 32.95 KG/M2 | WEIGHT: 198 LBS

## 2021-06-02 VITALS — BODY MASS INDEX: 32.45 KG/M2 | WEIGHT: 195 LBS

## 2021-06-02 VITALS — BODY MASS INDEX: 32.12 KG/M2 | WEIGHT: 193 LBS

## 2021-06-02 NOTE — PROGRESS NOTES
Dear  Maria D Verde Md  [address] [place] [address]    Thank you for the opportunity to participate in the care of  Stacey Allen.    Stacey Allen is sent by Maria D Verde for a sleep consultation regarding snoring and sleep fragmentation.     She has a history of GERD, HTN, and osteoarthritis.    Patient reports she doesn't sleep well. Wakes 3 - 4 times per night and has anxious dreams and can't go back to sleep.    Reports she is scheduled to get surgery in January and gets leg cramps but those aren't driving her wake-ups.    Lives alone.      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     Gets into bed around 8 PM.  Sometimes watches TV or reads, or tries to calm self.  Usually asleep within 20 minutes.  Up around midnight and will get up and walk around to check on house.  Gets into bed and tosses and turns for about 1 hour then sleeps until 2 - 4 AM.  Usually eventually back asleep until 5 or 6 AM.      Sleep Disordered Breathing - Reports she snores only seasonally - she hasn't been told she snores but has occasionally had snort arousals. No witnessed apneas.   Has nocturia 2 - 3 times per night.  Was getting nocturnal GERD prior to taking pantoprazole.     Upon waking feels stiff and tired.  She denies morning headaches.  No morning dry mouth.  No morning sinus congestion.   Patient was counseled on the importance of driving while alert, to pull over if drowsy, or nap before getting into the vehicle if sleepy.    Movement/Behaviors - No somniloquy.  No somnambulism.  No sleep related eating.  No dream enactment behavior.   She denies bruxism.    Patient denies typical restless legs syndrome symptoms.    Alcohol use - 3 nights per week will have 1 - 2 glasses.  Caffeine intake - coffee 3 /day. Last caffeine intake is usually in the morning.  Tobacco exposure -  Remote former.  Illicit substances - none    Lives with no one.  Has 2 pets, dog and cat. Cat sleeps with her.    Did have family history of snoring in both parents.    Past Medical History:  Past Medical History:   Diagnosis Date     Hyperlipidemia      Hypertension      Osteoporosis      Stomach ulcer        Problem List:  Patient Active Problem List    Diagnosis Date Noted     Neoplasm of uncertain behavior of stomach, intestines, and rectum 2019     Osteoarthritis 2019     Colorectal polyps 2019     Essential hypertension 2018     Family history of malignant neoplasm of colon 2018     Family history of malignant neoplasm of digestive organs 2018     History of colonic polyps 2018     Overview Note:     Overview:   adenomatous, next full colonoscopy        Osteoporosis 2017     Elevated liver enzymes 10/31/2017     RUQ abdominal pain 2016     Dietary counseling and surveillance 2014     Esophageal reflux 2014     Bartholin Gland Cyst      Overview Note:     Created by Conversion         Hypercholesterolemia      Overview Note:     Created by Conversion         Decrease In Height      Overview Note:     Created by Conversion         Chronic Reflux Esophagitis      Overview Note:     Created by Conversion         Epistaxis      Overview Note:     Created by Conversion         Benign Polyps Of The Large Intestine      Overview Note:     Created by Conversion  [facility] Annotation: 2012  [id number] - Chrystal Bell: Adenomatours,   next colonoscopy due          Diverticular disease of colon 2013     Special screening for malignant neoplasms, colon 2013     Benign neoplasm of duodenum, jejunum, 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 90 tablet 3     lisinopril-hydrochlorothiazide (ZESTORETIC) 20-25 mg per tablet Take 1 tablet by mouth daily. In the AM for high Blood Pressure 90 tablet 3     naproxen sodium (ALEVE) 220 MG tablet Take 1-2 tablets by mouth every 6 (six) hours as needed.       pantoprazole (PROTONIX) 40 MG tablet Take 40 mg by mouth daily.       No current facility-administered medications for this visit.         Allergies:  Patient has no known allergies.     Social History:  Social History     Socioeconomic History     Marital status: Single     Spouse name: Not on file     Number of children: Not on file     Years of education: Not on file     Highest education level: Not on file   Occupational History     Not on file   Social Needs     Financial resource strain: Not on file     Food insecurity:     Worry: Not on file     Inability: Not on file     Transportation needs:     Medical: Not on file     Non-medical: Not on file   Tobacco Use     Smoking status: Former Smoker     Last attempt to quit: 1980     Years since quittin.2     Smokeless tobacco: Never Used   Substance and Sexual Activity     Alcohol use: Yes     Comment: social     Drug use: No     Sexual activity: Not on file   Lifestyle     Physical activity:     Days per week: Not on file     Minutes per session: Not on file     Stress: Not on file   Relationships     Social connections:     Talks on phone: Not on file     Gets together: Not on file     Attends Jainism service: Not on file     Active member of club or organization: Not on file      "Attends meetings of clubs or organizations: Not on file     Relationship status: Not on file     Intimate partner violence:     Fear of current or ex partner: Not on file     Emotionally abused: Not on file     Physically abused: Not on file     Forced sexual activity: Not on file   Other Topics Concern     Not on file   Social History Narrative     Not on file        Family History:  Family History   Problem Relation Age of Onset     Colon cancer Mother 74     Diabetes Mother      Cancer Sister 62        Lung     Aneurysm Father         Review of Systems: Excessive urination  A complete review of systems reviewed by me is negative with the exeption of what has been mentioned in the history of present illness.    Physical Exam:  /64 (Patient Site: Right Arm, Patient Position: Sitting, Cuff Size: Adult Regular)   Pulse 90   Ht 5' 5\" (1.651 m)   Wt 205 lb (93 kg)   LMP 07/08/1992   SpO2 96%   BMI 34.11 kg/m    General appearance: No apparent distress, well groomed.    HEENT:   Head: Normocephalic, atraumatic.  Eyes: PERRL  Nose: Nares patent.  No exudate.  No septal deviation noted.  Mouth: Teeth: Good repair for age   Tongue: Normal  Oropharynx:  Mallampati Classification: IV    Tonsils: Grade 0    Uvula: Normal    Neck: Supple without bruit.      Cardiac: Regular rate and rhythm.  No murmurs.  Radial pulses are strong and symmetric.  Pulmonary: Symmetric air movement, lungs clear to auscultation bilaterally.  Musculoskeletal: No edema noted.  No clubbing or cyanosis.  Skin: Warm, dry, intact.  Neurologic: Alert and oriented to person, place and time   Gait is normal.  Psychiatric: Affect pleasant.  Mood good.     Labs/Studies:  Lab Results   Component Value Date    WBC 4.9 05/29/2013    HGB 11.3 (L) 05/29/2013    HCT 34.3 (L) 05/29/2013    MCV 85 05/29/2013     05/29/2013         Chemistry        Component Value Date/Time     10/26/2018 1607    K 4.5 10/26/2018 1607     10/26/2018 1607 "    CO2 30 10/26/2018 1607    BUN 32 (H) 10/26/2018 1607    CREATININE 1.20 (H) 10/26/2018 1607    GLU 90 10/26/2018 1607        Component Value Date/Time    CALCIUM 9.8 10/26/2018 1607    ALKPHOS 87 10/26/2018 1607    AST 19 10/26/2018 1607    ALT 20 10/26/2018 1607    BILITOT 1.0 10/26/2018 1607        No results found for: FERRITIN  No results found for: TSH  Lab Results   Component Value Date    HGBA1C 5.5 10/23/2017     Assessment and Plan:  1. Snoring  Snoring is intermittent and her primary complaints center around #2 so I'd like to start with working on her insomnia.  If she continues to have sleep issues after addressing #2 then it would be worthwhile to eval for MORGAN with PSG given EDS and age with HTN.    2. Psychophysiological insomnia   Part of today's visit was spent visiting sleep hygiene and CBT-I concepts.  We employed a(n) basic review of sleep drives (homeostatic and circadian processes) as well as ultradian sleep cycles, typical sleep architecture, normative data on sleep needs and sleep timing.  She was provided with referral for interactive online cognitive behavioral therapy for insomnia programs to further continue her education on sleep strategies.    - Ambulatory referral to Psychology     Patient verbalized understanding of these issues, agrees with the plan and all questions were answered today. Patient was given an opportuntity to voice any other symptoms or concerns not listed above. Patient did not have any other symptoms or concerns.      Edy Obregon MD  [facility] Board Certified in Internal Medicine and Sleep Medicine  [facility].

## 2021-06-03 VITALS — BODY MASS INDEX: 31.95 KG/M2 | WEIGHT: 192 LBS

## 2021-06-03 VITALS — BODY MASS INDEX: 32.78 KG/M2 | WEIGHT: 197 LBS

## 2021-06-03 VITALS — HEIGHT: 65 IN | WEIGHT: 197 LBS | BODY MASS INDEX: 32.82 KG/M2

## 2021-06-03 VITALS
HEART RATE: 90 BPM | OXYGEN SATURATION: 96 % | DIASTOLIC BLOOD PRESSURE: 64 MMHG | HEIGHT: 65 IN | SYSTOLIC BLOOD PRESSURE: 138 MMHG | BODY MASS INDEX: 34.16 KG/M2 | WEIGHT: 205 LBS

## 2021-06-04 VITALS
HEART RATE: 70 BPM | HEIGHT: 65 IN | SYSTOLIC BLOOD PRESSURE: 132 MMHG | DIASTOLIC BLOOD PRESSURE: 64 MMHG | OXYGEN SATURATION: 97 % | WEIGHT: 211 LBS | BODY MASS INDEX: 35.16 KG/M2

## 2021-06-04 VITALS
TEMPERATURE: 98.3 F | BODY MASS INDEX: 32.32 KG/M2 | RESPIRATION RATE: 20 BRPM | DIASTOLIC BLOOD PRESSURE: 70 MMHG | WEIGHT: 201.1 LBS | SYSTOLIC BLOOD PRESSURE: 154 MMHG | HEIGHT: 66 IN | HEART RATE: 84 BPM

## 2021-06-04 VITALS
HEART RATE: 80 BPM | DIASTOLIC BLOOD PRESSURE: 75 MMHG | BODY MASS INDEX: 33.78 KG/M2 | OXYGEN SATURATION: 98 % | SYSTOLIC BLOOD PRESSURE: 121 MMHG | WEIGHT: 203 LBS

## 2021-06-04 VITALS — HEART RATE: 85 BPM | OXYGEN SATURATION: 98 % | HEIGHT: 65 IN | BODY MASS INDEX: 34.49 KG/M2 | WEIGHT: 207 LBS

## 2021-06-04 NOTE — TELEPHONE ENCOUNTER
RN cannot approve Refill Request    RN can NOT refill this medication PCP messaged that patient is overdue for Labs. Last office visit: 4/19/2019 Maria D Verde MD Last Physical: 10/23/2017 Last MTM visit: Visit date not found Last visit same specialty: 4/19/2019 Maria D Verde MD.  Next visit within 3 mo: Visit date not found  Next physical within 3 mo: Visit date not found      Richard Ramos, [facility] Triage/Med Refill 12/8/2019    Requested Prescriptions   Pending Prescriptions Disp Refills     lisinopril-hydrochlorothiazide (PRINZIDE,ZESTORETIC) 20-25 mg per tablet [Pharmacy Med Name: LISINOPRIL-HCTZ 20-25 MG TAB] 90 tablet 3     Sig: TAKE 1 TABLET BY MOUTH DAILY IN THE AM FOR HIGH BLOOD PRESSURE       Diuretics/Combination Diuretics Refill Protocol  Failed - 12/7/2019 12:30 AM        Failed - Serum Potassium in past 12 months      No results found for: LN-POTASSIUM          Failed - Serum Sodium in past 12 months      No results found for: LN-SODIUM          Failed - Serum Creatinine in past 12 months      Creatinine   Date Value Ref Range Status   10/26/2018 1.20 (H) 0.60 - 1.10 mg/dL Final             Passed - Visit with PCP or prescribing provider visit in past 12 months     Last office visit with prescriber/PCP: 4/19/2019 Maria D Verde MD OR same dept: 4/19/2019 Maria D Verde MD OR same specialty: 4/19/2019 Maria D Verde MD  Last physical: 10/23/2017 Last MTM visit: Visit date not found   Next visit within 3 mo: Visit date not found  Next physical within 3 mo: Visit date not found  Prescriber OR PCP: Maria D Verde MD  Last diagnosis associated with med order: 1. Essential hypertension  - lisinopril-hydrochlorothiazide (PRINZIDE,ZESTORETIC) 20-25 mg per tablet [Pharmacy Med Name: LISINOPRIL-HCTZ 20-25 MG TAB]; Take 1 tablet by mouth daily. In the AM for high Blood Pressure  Dispense: 90 tablet; Refill: 3    If protocol  passes may refill for 12 months if within 3 months of last provider visit (or a total of 15 months).             Passed - Blood pressure on file in past 12 months     BP Readings from Last 1 Encounters:   10/18/19 138/64

## 2021-06-05 VITALS
OXYGEN SATURATION: 99 % | BODY MASS INDEX: 35.49 KG/M2 | DIASTOLIC BLOOD PRESSURE: 80 MMHG | HEIGHT: 65 IN | TEMPERATURE: 98.5 F | WEIGHT: 213 LBS | RESPIRATION RATE: 16 BRPM | SYSTOLIC BLOOD PRESSURE: 122 MMHG | HEART RATE: 75 BPM

## 2021-06-05 VITALS
SYSTOLIC BLOOD PRESSURE: 144 MMHG | BODY MASS INDEX: 35.65 KG/M2 | HEART RATE: 82 BPM | DIASTOLIC BLOOD PRESSURE: 80 MMHG | OXYGEN SATURATION: 98 % | TEMPERATURE: 97.6 F | HEIGHT: 65 IN | WEIGHT: 214 LBS

## 2021-06-05 NOTE — TELEPHONE ENCOUNTER
Pt's informed. She just had spine surgery and will call back around February when she can move around to schedule her Annual wellness visit appointment. xl

## 2021-06-05 NOTE — TELEPHONE ENCOUNTER
RN cannot approve Refill Request    RN can NOT refill this medication Protocol failed and NO refill given.      Sandhya Sanchez, [facility] Triage/Med Refill 1/14/2020    Requested Prescriptions   Pending Prescriptions Disp Refills     lisinopril-hydrochlorothiazide (PRINZIDE,ZESTORETIC) 20-25 mg per tablet [Pharmacy Med Name: LISINOPRIL-HCTZ 20-25 MG TAB] 90 tablet 3     Sig: TAKE 1 TABLET BY MOUTH DAILY IN THE AM FOR HIGH BLOOD PRESSURE       Diuretics/Combination Diuretics Refill Protocol  Failed - 1/11/2020  1:39 PM        Failed - Serum Potassium in past 12 months      No results found for: LN-POTASSIUM          Failed - Serum Sodium in past 12 months      No results found for: LN-SODIUM          Failed - Serum Creatinine in past 12 months      Creatinine   Date Value Ref Range Status   10/26/2018 1.20 (H) 0.60 - 1.10 mg/dL Final             Passed - Visit with PCP or prescribing provider visit in past 12 months     Last office visit with prescriber/PCP: 4/19/2019 Maria D Verde MD OR same dept: 4/19/2019 Maria D Verde MD OR same specialty: 4/19/2019 Maria D Verde MD  Last physical: 10/23/2017 Last MTM visit: Visit date not found   Next visit within 3 mo: Visit date not found  Next physical within 3 mo: Visit date not found  Prescriber OR PCP: Maria D Verde MD  Last diagnosis associated with med order: 1. Essential hypertension  - lisinopril-hydrochlorothiazide (PRINZIDE,ZESTORETIC) 20-25 mg per tablet [Pharmacy Med Name: LISINOPRIL-HCTZ 20-25 MG TAB]; TAKE 1 TABLET BY MOUTH DAILY IN THE AM FOR HIGH BLOOD PRESSURE  Dispense: 30 tablet; Refill: 0    If protocol passes may refill for 12 months if within 3 months of last provider visit (or a total of 15 months).             Passed - Blood pressure on file in past 12 months     BP Readings from Last 1 Encounters:   10/18/19 138/64

## 2021-06-05 NOTE — TELEPHONE ENCOUNTER
Call pt - she is overdue for annual wellness visit or med check and labs.  Please have her schedule.

## 2021-06-05 NOTE — TELEPHONE ENCOUNTER
Refill Approved    Rx renewed per Medication Renewal Policy. Medication was last renewed on 10/26/18.    Eldia Patel, [facility] Triage/Med Refill 1/10/2020     Requested Prescriptions   Pending Prescriptions Disp Refills     amLODIPine (NORVASC) 5 MG tablet [Pharmacy Med Name: AMLODIPINE BESYLATE 5 MG TAB] 90 tablet 3     Sig: TAKE 1 TABLET BY MOUTH EVERY DAY       Calcium-Channel Blockers Protocol Passed - 1/10/2020  1:59 AM        Passed - PCP or prescribing provider visit in past 12 months or next 3 months     Last office visit with prescriber/PCP: 4/19/2019 Maria D Verde MD OR same dept: 4/19/2019 Maria D Verde MD OR same specialty: 4/19/2019 Maria D Verde MD  Last physical: 10/23/2017 Last MTM visit: Visit date not found   Next visit within 3 mo: Visit date not found  Next physical within 3 mo: Visit date not found  Prescriber OR PCP: Maria D Verde MD  Last diagnosis associated with med order: 1. Essential hypertension  - amLODIPine (NORVASC) 5 MG tablet [Pharmacy Med Name: AMLODIPINE BESYLATE 5 MG TAB]; TAKE 1 TABLET BY MOUTH EVERY DAY  Dispense: 90 tablet; Refill: 3    If protocol passes may refill for 12 months if within 3 months of last provider visit (or a total of 15 months).             Passed - Blood pressure filed in past 12 months     BP Readings from Last 1 Encounters:   10/18/19 138/64

## 2021-06-06 NOTE — TELEPHONE ENCOUNTER
Patient calling.  She is reporting that she had surgery on Jan 7th for fusion on L4 and L5  She is now c/o Bilateral foot , and leg swelling , sensitivity to touch on her left side.  She reports,   swelling  legs bilaterally at the end of the day., the swelling is worse.    And painful , and sensitive to touch on inside of left leg. Internally.  For about 1.week to week and a half.  Patient is asking to be seen.  Appointment made for today at 3:20pm with Dr. Moreland at [facility].  Sejal Paulino RN  [facility] Triage/refill nurse          Reason for Disposition    MODERATE swelling of both ankles (e.g., swelling extends up to the knees) AND new onset or worsening    Patient wants to be seen    Protocols used: LEG SWELLING AND EDEMA-A-OH

## 2021-06-06 NOTE — TELEPHONE ENCOUNTER
Patient Returning Call  Reason for call:  Return call  Information relayed to patient:  n/a  Patient has additional questions:  Yes  If YES, what are your questions/concerns:  Patient stated that she is returning a call. Patient stated that she is not sure who called her. Patient stated that she would like the prescription for compression stockings to be sent to [facility] in [place]. Patient stated that she is currently working is not able to take any phone calls. Patient stated to notified her when this is done and that it is ok to leave a detailed message on her voicemail.  Okay to leave a detailed message?: Yes  [phone]

## 2021-06-06 NOTE — PROGRESS NOTES
OFFICE VISIT - FAMILY MEDICINE     ASSESSMENT AND PLAN     1. Pain of left lower leg  US Venous Leg Left   2. Venous (peripheral) insufficiency  Compression stockings 15/20 mmHg; Knee   3. Visit for screening mammogram  Mammo Screening Bilateral   Peripheral edema with pain in the medial aspect of the left calf, ultrasound done stat was negative for superficial and deep venous thrombosis, result discussed with the patient, will have her wear some compression REGI stockings, keep her legs elevated, and let us know if she is still not improving in a couple of weeks.  For back surgery she will continue to follow with her surgeon.    CHIEF COMPLAINT   Edema (bilateral legs, started a few weeks ago. Lt. leg is tender to the touch, some pain in rt. foot. Patient did have back surgery done 1/7/20, currently is not wearing compression socks. Patient notices when up walking for short distances, legs get swollen. Is a teacher) and Numbness (bilateral toes since surgery. Patient feels like numbness is worse since having pain/swelling in legs.)    HPI   Stacey Allen is a 74 y.o. female.  No Patient Care Coordination Note on file.    Pain and swelling in the lower extremities, left greater than right, no injury, progressive for the past few weeks.  She did have a laminectomy surgery about a month ago, she was immobilized for a few weeks, but her back pain has been improving.  Occasionally get numbness of her lower extremities, but improved with her back surgery.  She is planning to follow-up with her surgeon.  No chest pain, shortness of breath or dizziness.      Review of Systems As per HPI, otherwise negative.    OBJECTIVE   /75 (Patient Site: Left Arm, Patient Position: Sitting, Cuff Size: Adult Regular)   Pulse 80   Wt 203 lb (92.1 kg)   LMP 07/08/1992   SpO2 98%   BMI 33.78 kg/m    Physical Exam   Constitutional: She is oriented to person, place, and time. She appears well-developed and well-nourished.   HENT:    Head: Normocephalic and atraumatic.   Neck: Normal range of motion. Neck supple. No JVD present. No tracheal deviation present. No thyromegaly present.   Cardiovascular: Normal rate, regular rhythm, normal heart sounds and intact distal pulses. Exam reveals no gallop and no friction rub.   No murmur heard.  Pulmonary/Chest: Effort normal and breath sounds normal. No respiratory distress. She has no wheezes. She has no rales.   Musculoskeletal:         General: Tenderness (Left medial calf area.) and edema (Bilateral lower extremities ) present.   Lymphadenopathy:     She has no cervical adenopathy.   Neurological: She is alert and oriented to person, place, and time. Coordination normal.   Psychiatric: She has a normal mood and affect. Judgment and thought content normal.       PFSH     Family History   Problem Relation Age of Onset     Colon cancer Mother 74     Diabetes Mother      Cancer Sister 62        Lung     Aneurysm Father      Social History     Socioeconomic History     Marital status: Single     Spouse name: Not on file     Number of children: Not on file     Years of education: Not on file     Highest education level: Not on file   Occupational History     Not on file   Social Needs     Financial resource strain: Not on file     Food insecurity:     Worry: Not on file     Inability: Not on file     Transportation needs:     Medical: Not on file     Non-medical: Not on file   Tobacco Use     Smoking status: Former Smoker     Last attempt to quit: 1980     Years since quittin.6     Smokeless tobacco: Never Used   Substance and Sexual Activity     Alcohol use: Yes     Comment: social     Drug use: No     Sexual activity: Not on file   Lifestyle     Physical activity:     Days per week: Not on file     Minutes per session: Not on file     Stress: Not on file   Relationships     Social connections:     Talks on phone: Not on file     Gets together: Not on file     Attends Jew service: Not on  file     Active member of club or organization: Not on file     Attends meetings of clubs or organizations: Not on file     Relationship status: Not on file     Intimate partner violence:     Fear of current or ex partner: Not on file     Emotionally abused: Not on file     Physically abused: Not on file     Forced sexual activity: Not on file   Other Topics Concern     Not on file   Social History Narrative     Not on file     Relevant history was reviewed with the patient today, unless noted in HPI, nothing is pertinent for this visit.  [facility]     Patient Active Problem List    Diagnosis Date Noted     Neoplasm of uncertain behavior of stomach, intestines, and rectum 2019     Osteoarthritis 2019     Essential hypertension 2018     Family history of malignant neoplasm of colon 2018     Family history of malignant neoplasm of digestive organs 2018     Osteoporosis 2017     Elevated liver enzymes 10/31/2017     RUQ abdominal pain 2016     Esophageal reflux 2014     Hypercholesterolemia      Overview Note:     Created by Conversion         Chronic Reflux Esophagitis      Overview Note:     Created by Conversion         Epistaxis      Overview Note:     Created by Conversion         Benign Polyps Of The Large Intestine      Overview Note:     Created by Conversion  [facility] Annotation: 2012  9:49Chrystal Zhu: Adenomatours,   next colonoscopy due          Diverticular disease of colon 2013     Special screening for malignant neoplasms, colon 2013     Benign neoplasm of duodenum, jejunum, 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  Us Venous Leg Left    Result Date: 3/2/2020  EXAM: US VENOUS LEG LEFT LOCATION: [facility] DATE/TIME: 3/2/2020 6:09 PM INDICATION: left leg swelling and pain COMPARISON: 05/23/2011 TECHNIQUE: Venous Duplex ultrasound of the left lower extremity with and without compression, augmentation and duplex. Color flow and spectral Doppler with waveform analysis performed. FINDINGS: Exam includes the common femoral, femoral, popliteal, and contralateral common femoral veins as well as segmentally visualized deep calf veins and greater saphenous vein. LEFT: No deep vein thrombosis. No superficial thrombophlebitis. No popliteal cyst.     1.  No thrombus in the left leg. 2.  Results called by the technologist.    MEDICATIONS     Current Outpatient Medications on File Prior to Visit   Medication Sig Dispense Refill     acetaminophen (TYLENOL) 500 MG tablet Take 1,000 mg by mouth.       amLODIPine (NORVASC) 5 MG tablet Take 1 tablet (5 mg total) by mouth daily. 90 tablet 2     lisinopril-hydrochlorothiazide (PRINZIDE,ZESTORETIC) 20-25 mg per tablet TAKE 1 TABLET BY MOUTH DAILY IN THE AM FOR HIGH BLOOD PRESSURE 90 tablet 0     multivitamin with minerals (THERA-M) 9 mg iron-400 mcg Tab tablet Take 1 tablet by mouth daily.       pantoprazole (PROTONIX) 40 MG tablet Take 40 mg by mouth daily.       No current facility-administered medications on file prior to visit.        HEALTH MAINTENANCE / SCREENING   PHQ-2 Total Score: 2 (3/2/2020  4:00 PM)  , PHQ-9 Total Score: 7 (3/2/2020  4:00 PM)  ,KRYSTAL 7 Total Score: 1 (3/2/2020  4:00 PM)    Immunization History   Administered Date(s) Administered     Influenza [id number], 01/10/2010     Influenza high dose,seasonal,PF, 65+ yrs 10/14/2015, 09/30/2016, 10/04/2017,  09/14/2018, 10/08/2019     Influenza, inj, historic,unspecified 09/16/2009, 10/20/2010     Influenza, seasonal,quad inj 6-35 mos 10/13/2011, 09/25/2012, 10/14/2014     Influenza,seasonal quad, PF, =/> 6months 10/01/2015     Influenza,seasonal, Inj IIV3 10/20/2010     Pneumo Conj 13-V (2010&after) 10/23/2017     Pneumo Polysac 23-V 01/01/2013, 05/17/2013     Td, Adult, Absorbed 01/01/2005     Tdap 09/09/2011     ZOSTER, LIVE 10/28/2009     Health Maintenance   Topic     HEPATITIS C SCREENING      ZOSTER VACCINES (2 of 3)     MEDICARE ANNUAL WELLNESS VISIT      MAMMOGRAM      FALL RISK ASSESSMENT      TD 18+ HE      DXA SCAN      LIPID      ADVANCE CARE PLANNING      COLONOSCOPY      DEPRESSION ACTION PLAN      PNEUMOCOCCAL IMMUNIZATION 65+ LOW/MEDIUM RISK      INFLUENZA VACCINE RULE BASED        Roula Moreland MD  Family Medicine, [facility]     This note was dictated using a voice recognition software.  Any grammatical or context distortion are unintentional and inherent to the software.

## 2021-06-06 NOTE — PROGRESS NOTES
NEW Venous insufficiency, leg pain w/ swelling. self referral. Bilateral leg pain, left worse than right 8/10.  Bilateral leg swelling.  Pain too severe in left leg below knee to keep compression stocking on throughout the day.

## 2021-06-07 NOTE — TELEPHONE ENCOUNTER
RN cannot approve Refill Request    RN can NOT refill this medication PCP messaged that patient is overdue for Labs. Last office visit: 4/19/2019 Maria D Verde MD Last Physical: 10/23/2017 Last MTM visit: Visit date not found Last visit same specialty: 3/2/2020 Roula Moreland MD.  Next visit within 3 mo: Visit date not found  Next physical within 3 mo: Visit date not found      Maria C Funez, [facility] Triage/Med Refill 4/11/2020    Requested Prescriptions   Pending Prescriptions Disp Refills     lisinopriL-hydrochlorothiazide (PRINZIDE,ZESTORETIC) 20-25 mg per tablet [Pharmacy Med Name: LISINOPRIL-HCTZ 20-25 MG TAB] 90 tablet 0     Sig: TAKE 1 TABLET BY MOUTH DAILY IN THE AM FOR HIGH BLOOD PRESSURE       Diuretics/Combination Diuretics Refill Protocol  Failed - 4/11/2020 12:18 AM        Failed - Serum Potassium in past 12 months      No results found for: LN-POTASSIUM          Failed - Serum Sodium in past 12 months      No results found for: LN-SODIUM          Failed - Serum Creatinine in past 12 months      Creatinine   Date Value Ref Range Status   10/26/2018 1.20 (H) 0.60 - 1.10 mg/dL Final             Passed - Visit with PCP or prescribing provider visit in past 12 months     Last office visit with prescriber/PCP: 4/19/2019 Maria D Verde MD OR same dept: 3/2/2020 Roula Moreland MD OR same specialty: 3/2/2020 Roula Moreland MD  Last physical: 10/23/2017 Last MTM visit: Visit date not found   Next visit within 3 mo: Visit date not found  Next physical within 3 mo: Visit date not found  Prescriber OR PCP: Maria D Verde MD  Last diagnosis associated with med order: 1. Essential hypertension  - lisinopriL-hydrochlorothiazide (PRINZIDE,ZESTORETIC) 20-25 mg per tablet [Pharmacy Med Name: LISINOPRIL-HCTZ 20-25 MG TAB]; TAKE 1 TABLET BY MOUTH DAILY IN THE AM FOR HIGH BLOOD PRESSURE  Dispense: 90 tablet; Refill: 0    If protocol passes may  refill for 12 months if within 3 months of last provider visit (or a total of 15 months).             Passed - Blood pressure on file in past 12 months     BP Readings from Last 1 Encounters:   03/11/20 154/70

## 2021-06-07 NOTE — PROGRESS NOTES
"Stacey Allen is a 74 y.o. female who is being evaluated via a billable telephone visit.      The patient has been notified of following:     \"This telephone visit will be conducted via a call between you and your physician/provider. We have found that certain health care needs can be provided without the need for a physical exam.  This service lets us provide the care you need with a short phone conversation.  If a prescription is necessary we can send it directly to your pharmacy.  If lab work is needed we can place an order for that and you can then stop by our lab to have the test done at a later time.    If during the course of the call the physician/provider feels a telephone visit is not appropriate, you will not be charged for this service.\"         Stacey Allen complains of    Chief Complaint   Patient presents with     Follow-up     Ultrasound on leg   On January 7 she had back surgery at the [facility] for decompression and posterior fusion L4-5 due to spinal stenosis.  She had sciatica pain going down both legs- left greater than right.  Post recovery had little numbness in toes.  About 1 month ago had lot of ankle swelling when she was standing. Also had some soreness in tops of her feet.  Left leg became tender to touch / hot / prickly.  No weakness.  Distribution is front of left thigh above the knee then inside part of shin and top part of left foot which is numb.  When she presses on thigh feels achy and very sensitive to touch.  She was seen and sent for left leg ultrasound which was negative.  She saw vascular who did another ultrasound and her left leg veins are competent.  She was taking gabapentin before her surgery but it did not help.  Can't remember dose but did titrate up quite a bit- no side effects.    She is also having a lot of muscle cramps in her legs at night.    Patient Active Problem List   Diagnosis     Hypercholesterolemia     Gastro-esophageal reflux disease with esophagitis     " Benign neoplasm of transverse colon     Osteoporosis     Essential hypertension     Benign neoplasm of duodenum, jejunum, and ileum     Diaphragmatic hernia     Diverticular disease of colon     Dysthymic disorder     Family history of malignant neoplasm of colon     Osteoarthritis     Low back pain     Spinal stenosis        I have reviewed and updated the patient's Past Medical History, Social History, Family History and Medication List.    ALLERGIES  Patient has no known allergies.    Additional provider notes: none    Assessment/Plan:  1. Neuropathic pain, leg, left  Will restart gabapentin based on her description of pain.  She needs to make contact with the neurosurgery team at [place] since this could be a complication from her surgery.  - gabapentin (NEURONTIN) 300 MG capsule; Take one at bedtime and titrate up to one three times a day by increasing one every 4 days.  Dispense: 90 capsule; Refill: 0    2. Leg cramps  Start on magnesium - 400-600 mg daily.        Phone call duration:  10 minutes    Maria D Verde MD

## 2021-06-07 NOTE — TELEPHONE ENCOUNTER
Direction: Pt is now taking 3 tablet po daily. Please correct direction on rx qued up and sign order. Thanks!

## 2021-06-09 NOTE — PATIENT INSTRUCTIONS - HE
Great to meet you Yesi    Follow through with our osteoporosis care  Continue taking calcium supplementation  Your vitamin D was excellent  Discussed with the osteoporosis care folks treatment options  I would like to avoid medications like Fosamax or Boniva because of issues with reflux    Has a stated injury is not out on pantoprazole or proton pump inhibitors  This may contribute to osteoporosis  Work on continuing to eat high nutrient foods  Weightbearing exercise    We will screen today for hepatitis C    I would like you to stop  Lisinopril hydrochlorothiazide  Amlodipine  These are likely contributing to leg swelling as well as cramping    I would like you to start  Lisinopril 20 mg 1 daily  Goal blood pressure is less than 140 on the top number less than 85 and the bottom number    Use compression stockings to help with any swelling  Continue magnesium supplement      Give you a shingles shot today      Adult Physical AVS      Thank you for scheduling and completing a Physical Check up!      There has been suggestions that this is an Unnecessary part of the current care for patients by some. I do not agree!    It is a reminder to take stock in an overall health plan.     It also hopefully will engage dialogue about wellness and focusing on measure to stay well and fit, hopefully avoiding extra trips to see us!      -----------Wellness Pillars-----------    1. Nutrient Dense Nutrition-- we are or will become and are activated by what we eat! It is paramount that we all focus on eating well. This means trying to eat ?hole Foods,  single ingredient foods that nourish and activate our bodies. Food that are high in nutrients help run our bodies in a way that ricci and can transform our health and help us to heal and repair. Major groups include. Fats----preferably plant based. These help ours brains and nervous system. Proteins---from plants and animals. Nuts, Beans and Animal proteins. These help our  musculoskeletal system. Complex Carbohydrates---fresh fruits vegetables, and whole grains. In order to maintain balance, we must give our bodies balanced nutrition. There are things we should avoid. Most processed foods and all added sugar should be avoided. If you do not prepare your own food, order simple foods a la carte. Order a protein and pair it with a side of vegetables. Vegetable should occupy more than half of your plate. Try to avoid simple carbohydrates like chips or fries.   2. Restorative Sleep----we all need sleep to allow our bodies to heal and repair. Sleep is indispensable and necessary. Good quality sleep is different from the simple quantitative amount of sleep. It is possible to sleep without getting any rest or restorative sleep. This is why we ask about refreshing sleep, because it is possible to sleep and not have restorative sleep. If your sleep is not refreshing, you may requires a visit with a Sleep Specialist to help sort this out. For quantity, aim for 8-10 hours daily.   3. Movement---exercise has numerous health benefits. Bottom line, in order to do the things we do in life, it is necessary to condition, nurture and repair our machine. Food provides the tools and the basic repair structure and vital nutrients. Rest allows time and focus for repair and restoration. Exercise conditions and activated reparative mechanisms. I would suggest thinking about one's high school weight as a rule and aim for a weight plus 15 pounds for men and 10 pounds for women as a goal. Also we should strive to engage in intentional activity 3 to 4 days of cardio fitness 30-60 minutes and 1-3 days of strength training. We want to be fit as we age and have stamina to do activities of daily living and beyond. Walk, bike, swim, run, box, dance, and so on, find your thing! The benefits are incredible! Exercise lowers blood pressure, helps treat and prevent diabetes and helps us live longer and in a way that is  more satisfying. As Yarely says,  Just Do It!  4. Mindfulness----get in touch with your mind body connection. Take time daily to reflect on and be cognizant of how you are doing----eating, working, parenting, interacting with loved ones, friends and others. Be intentional and present in relating to things in which you are engaged.     Preventative Care    Colon Cancer screening. It may not be glamorous, but it saves lives and may save yours. There is colonoscopy and immuno-chemical testing. Pick your mode , but get it done. If there is a first degree relative that has had Colon Cancer, we may recommend screening 10 years before the age of that index case.     Breast Cancer Screening. This is mostly for women. Get to know the architecture of your breasts. If it makes you feel more comfortable, engage your partner as well. If something strikes you as ?ot right,  get it checked out. Mammogram breast cancer screening does detect cancers. Self breast exams can detect cancers. Guidelines vary but in general start screen in adulthood for self exams and mammography in your 40s. If there is a first degree relative or many with cancers, this may be earlier or involve genetic screening.     Pap Smears Cervical Cancer Screening---again women. Start screening after sexual activity or intercourse begins. Cervical cancer really is tied to exposure to HPV virus and this is related to sexual intercourse. Cervical cancer is treatable and preventable, as Pap smears should detect changes BEFORE cancerous transformation.   All women that have a cervix should be screen between 21-65 as a rule. Intervals vary based on age and medical history.     Vaccines. Yes there is controversy. But I still think vaccines save lives and reduce disease burden in our human populations. Please consider getting vaccinated as you are due for Vaccines.     About Vitamins and Supplements     I do recommend that adult and kids consider a multivitamin as way to  enrich our nutrition.     A solid multivitamin. Ask one of us for recommendations. Our pharmacy carry some high quality lower cost vitamins that we have recommends.     [place] 3/6 Oils Fish Oils, Flax Oils, Krill Oils, Algea Oils, and Cumin Seed and Borage Oils are among the Omega 3s and 6s. Good oils nourish our nervous system and engage our immune system in positive ways.     Vitamin D. If you are in [place]. You probably need some. We shoot for a level of 50-60. So sometime this takes staring lower and titrating up a supplement. Start at 6639-3992 IU wit a fatty meal and check levels.             Certain supplements may help with our gut's immune system or Microbiome.   Start with plants, many and of diverse colors.   Consider cultured foods with live active bacteria. Examples are Yogurt, Kefir and Kombucha.     Eat Prebiotic foods from the Chicori family, asparagus, bananas, inulin fibers and more.     Other supplements that may help are Zinc Carnosine, D-limonene, Vitamin D and Colostrum.     It may prudent to try the Elimination Diet and eliminate certain foods such as Wheat products, Dairy Products and Especially Refined Sugars.       We are screening multiple issues:     High Blood Pressure.   We ideally have readings less than 130 on the top number and less than 80 on the bottom.     Diabetes.   Diabetes is the body not processing sugars in an efficient way. When sugars are not dealt with in an efficient manner, every part of the body can be effected, the heart such as a heart attack or failure, the brain such as a stroke and really any part of the body. Insulin levels being overworked really drives diabetes. Lowering intake of simple sugar and exercising are two major ways to treat and stave off Diabetes.     Heart Disease:  Heart issues usually arise out of a combination of genetic issues and life stressors and choices.   Focusing on the Pillars of Wellness are ways to help prevent heart disease.  Avoiding tobacco, limiting alcohol intake to moderate intake and addressing out  of balance cholesterol, presence of diabetes and persistently elevated blood pressure may require medications in addition to life style changes.     Skin cancer is typically due to cumulative Sun Damage. There are other genetic factor as well. If skin looks irritated or a mole changes color, shape, size or has irregular borders, get it checked out. Skin exams can also save lives.     If you are Diabetic or Have Known Heart Disease. We have goals for your best Care     A1C. For Diabetics     This is a measure of how well controlled your sugars are over the last 3 months. In general, we would like the percent number less than 7% for most diabetics. In the morning and before all meals the sugar number should be less than 150. If you are diabetic and this is the case, you are managing well. The Pillars of Wellness are still a guide to keeping your body in balance.     Blood Pressure for Diabetics and Heart Disease     Top < 130 Bottom < 80  This goal of blood pressure control reduces Diabetes complications, such as stroke or heart attack. Life style first and add medication if consistently high.     No Tobacco. For Anyone!    Smoking tobacco or chewing produces by products that are particularly harmful to Diabetics, but really all of us. I implore you to not use tobacco products.     Aspirin for Heart Disease Patient     If you have high blood pressure, stroke or heart disease risk, you probably would benefit from a baby aspirin. There reasons to avoid aspirin such as allergies, risks for bleeding, etc. have the discussion of aspirin should be part of your therapy.     Statin Medications for Heart Disease, Vascular Disease or High Risk Patients    These medicine have statistically been shown to benefit Diabetic patients, especially those with disordered cholesterol profiles. I like to do an NMR panel that shows Atherogenic Risk (Stroke/  Heart Attack) and Insulin Resistance. Likely this may be recommended as part of you treatment plan.

## 2021-06-09 NOTE — PROGRESS NOTES
"ASSESSMENT & PLAN    Essential hypertension  Lisinopril HCTZ   20-25 I daily   Amlodipine 5 mg   No Side effects   Cramping ++  Feet and legs   Cough No  Light headed No  Leg swelling Yes Swell \"all the time\"    Amlodipine >>> Swelling  HCTZ  >>> Cramping     Gastro-esophageal reflux disease with esophagitis  Pantoprazole   40 mg 1 daily  Alternative worse   Gastroenterologist          Spinal stenosis  Spine Surgery   ? 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    Diagnoses and all orders for this visit:    Pain in both feet    Essential hypertension  -     Comprehensive Metabolic Panel  -     Magnesium  -     lisinopriL (PRINIVIL,ZESTRIL) 20 MG tablet; Take 1 tablet (20 mg total) by mouth daily.    Gastro-esophageal reflux disease with esophagitis    Spinal stenosis, unspecified spinal region    Anemia, unspecified type  -     Ferritin  -     Iron and Transferrin Iron Binding Capacity  -     Morphology, Path Smear Review (MORP)  -     Vitamin B12  -     Soluble Transferrin Receptor    Osteoporosis, unspecified osteoporosis type, unspecified pathological fracture presence  -     Parathyroid Hormone Intact  -     Electrophoresis, Protein, Serum  -     Ambulatory referral to Osteoporosis Care  -     Vitamin D, Total (25-Hydroxy)    Encounter for hepatitis C screening test for low risk patient  -     Hepatitis C Antibody (Anti-HCV)    Cramp of limb    Swelling of both lower extremities  -     Comprehensive Metabolic Panel  -     Magnesium    Other orders  -     Varicella Zoster, Recombinant Vaccine IM        Patient Instructions "       Great to meet you Yesi    Follow through with our osteoporosis care  Continue taking calcium supplementation  Your vitamin D was excellent  Discussed with the osteoporosis care folks treatment options  I would like to avoid medications like Fosamax or Boniva because of issues with reflux    Has a stated injury is not out on pantoprazole or proton pump inhibitors  This may contribute to osteoporosis  Work on continuing to eat high nutrient foods  Weightbearing exercise    We will screen today for hepatitis C    I would like you to stop  Lisinopril hydrochlorothiazide  Amlodipine  These are likely contributing to leg swelling as well as cramping    I would like you to start  Lisinopril 20 mg 1 daily  Goal blood pressure is less than 140 on the top number less than 85 and the bottom number    Use compression stockings to help with any swelling  Continue magnesium supplement        No follow-ups on file.       Little interest or pleasure in doing things: Not at all  Feeling down, depressed, or hopeless: Not at all    CHIEF COMPLAINT: Stacey Allen had concerns including Annual Wellness Visit (foot concerns, parking permit ).    [place]: 1.............. SUBJECTIVE:  Stacey Allen is a 75 y.o. female had concerns including Annual Wellness Visit (foot concerns, parking permit ).    1. Pain in both feet    2. Essential hypertension    3. Gastro-esophageal reflux disease with esophagitis    4. Spinal stenosis, unspecified spinal region    5. Anemia, unspecified type    6. Osteoporosis, unspecified osteoporosis type, unspecified pathological fracture presence    7. Encounter for hepatitis C screening test for low risk patient    8. Cramp of limb    9. Swelling of both lower extremities      Risk assessment reviewed  Clock test normal  Recall normal  Recheck blood pressure 116/60  Complaints of leg swelling pain feet bilaterally  Worse in the morning  After she has been up  The tops of her feet  Is on a diuretic  Is on  "amlodipine  Is having some leg cramping      No Known Allergies                      SOCIAL: She  reports that she quit smoking about 39 years ago. She has never used smokeless tobacco. She reports current alcohol use. She reports that she does not use drugs.    REVIEW OF SYSTEMS:   Family history not pertinent to chief complaint or presenting problem    Review of systems otherwise negative as requested from patient, except   Those positive ROS outlined and discussed in [place].      VITALS:  Vitals:    07/14/20 1505   Pulse: 85   SpO2: 98%   Weight: 207 lb (93.9 kg)   Height: 5' 5\" (1.651 m)     Wt Readings from Last 3 Encounters:   07/14/20 207 lb (93.9 kg)   03/11/20 201 lb 1.6 oz (91.2 kg)   03/02/20 203 lb (92.1 kg)     Body mass index is 34.45 kg/m .    Physical Exam:  Excellent distal pulses  Swelling but not pitting  Calves are supple  Excellent toenails  Lungs are clear  Cardiac S1-S2 either positive S4 or a midsystolic click  Lungs are clear  No carotid bruits  Full range of affect  Normal nonpressured speech  No tremor  No significant osteoarthritis changes of the hands  Back has straightening of the normal lumbar  Lordosis       I spent 30  minutes with this patient face to face, of which 50% or greater was spent in counseling and coordination of care with regards to Stacey was seen today for annual wellness visit.    Diagnoses and all orders for this visit:    Pain in both feet    Essential hypertension  -     Comprehensive Metabolic Panel  -     Magnesium  -     lisinopriL (PRINIVIL,ZESTRIL) 20 MG tablet; Take 1 tablet (20 mg total) by mouth daily.    Gastro-esophageal reflux disease with esophagitis    Spinal stenosis, unspecified spinal region    Anemia, unspecified type  -     Ferritin  -     Iron and Transferrin Iron Binding Capacity  -     Morphology, Path Smear Review (MORP)  -     Vitamin B12  -     Soluble Transferrin Receptor    Osteoporosis, unspecified osteoporosis type, unspecified " pathological fracture presence  -     Parathyroid Hormone Intact  -     Electrophoresis, Protein, Serum  -     Ambulatory referral to Osteoporosis Care  -     Vitamin D, Total (25-Hydroxy)    Encounter for hepatitis C screening test for low risk patient  -     Hepatitis C Antibody (Anti-HCV)    Cramp of limb    Swelling of both lower extremities  -     Comprehensive Metabolic Panel  -     Magnesium    Other orders  -     Varicella Zoster, Recombinant Vaccine IM        Ronnie Overton MD  [facility] [id number]  [phone]

## 2021-06-10 NOTE — PATIENT INSTRUCTIONS - HE
1.  Check insurance for prolia.    2.  Assuming coverage is reasonable, start Prolia with a nurse only appointment.    3.  Recheck bone density after one year on new therapy.    4.  Start vitamin D 2000 IU daily    5.  Desired calcium intake 1200 mg to 1500 mg daily between diet and supplement.    6.  Best calcium supplement is calcium citrate when on pantoprazole    7. See after next DXA scan

## 2021-06-10 NOTE — PROGRESS NOTES
ASSESSMENT:  1.  Osteoporosis:  Yesi had a lowest T score of -2.8 at L1 and L2 of the AP spine on a DEXA done at [place] on 12/19/2019.  Osteoporosis also was detected on a scan done here several years ago.  She does have a history of a proximal humerus fracture in 2011.  She has used vitamin D and calcium in the past.  She is not a good candidate for oral bisphosphonates due to a history of severe esophagitis.  She also has moderate renal insufficiency with a GFR of 44.  Various management options were discussed.  She is interested in Prolia.    2.  Esophageal reflux:  This has been quite severe in the past.  I do not feel she would be a good candidate for oral bisphosphonates due to this and her renal insufficiency    3.  Lumbar spinal stenosis:  She reports a good response to recent surgery    4.  Vitamin D deficiency:  Recent level was low at 28.5.    PLAN:  1.  Desired calcium intake is 1200 mg to 1500 mg daily between diet and supplement.  Calcium citrate would be the best supplement since she is on an acid suppressing medication.    2.  Vitamin D 2000 IU daily as an OTC supplement.  Take indefinitely    3.  Appropriate levels of light weightbearing exercise were reviewed.  Avoid putting a heavy compressive force on the spine.    4.  Check insurance coverage for Prolia.  Assuming coverage is reasonable, start 60 mg every 6 months.    5.  Recheck bone density after 1 year on Prolia with clinic visit to follow.    Orders Placed This Encounter   Procedures     DXA Bone Density Scan     Standing Status:   Future     Standing Expiration Date:   1/31/2022     Order Specific Question:   Can the procedure be changed per Radiologist protocol?     Answer:   Yes     There are no discontinued medications.    Return in about 1 year (around 7/31/2021) for Recheck.    ASSESSED PROBLEMS:  1. Age-related osteoporosis without current pathological fracture  denosumab 60 mg (PROLIA 60 mg/ml)    DXA Bone Density Scan   2. Personal  "history of other drug therapy  denosumab 60 mg (PROLIA 60 mg/ml)   3. Renal insufficiency     4. Gastro-esophageal reflux disease with esophagitis         CHIEF COMPLAINT:  Chief Complaint   Patient presents with     Osteoporosis Consult       HISTORY OF PRESENT ILLNESS:  Stacey is a 75 y.o. female seen for evaluation of osteoporosis.  She had a lowest T score of -2.8 at L1-L2 of the spine on a study done at [facility] in 2019.  She reports that she has erratically use calcium and vitamin D supplements.  She has not been on prescription medications for osteoporosis.  She had a proximal humerus fracture in  after a fall from standing height.  Family history is notable for mother with osteoporosis.  There is no family history of hip fracture.  She is a former smoker but quit years ago.  There is no history of nephrolithiasis or excessive alcohol intake.  She reports menopause at age 52.  Recent labs were reviewed.  Vitamin D level was slightly low at 28.5.  Parathyroid hormone level, serum protein electrophoresis, and other labs were fairly normal.  GFR was 44.    She has a history of quite severe esophageal reflux.    She has recovered well from recent back surgery for lumbar spinal stenosis    REVIEW OF SYSTEMS:    Comprehensive review of systems is otherwise negative.    PFSH:  Quit smoking years ago.  Social alcohol    TOBACCO USE:  Social History     Tobacco Use   Smoking Status Former Smoker     Last attempt to quit: 1980     Years since quittin.0   Smokeless Tobacco Never Used       VITALS:  Vitals:    20 1519   BP: 132/64   Patient Site: Left Arm   Patient Position: Sitting   Cuff Size: Adult Regular   Pulse: 70   SpO2: 97%   Weight: 211 lb (95.7 kg)   Height: 5' 5\" (1.651 m)     Wt Readings from Last 3 Encounters:   20 211 lb (95.7 kg)   20 207 lb (93.9 kg)   20 201 lb 1.6 oz (91.2 kg)       PHYSICAL EXAM:  Constitutional:   Reveals an alert pleasant moderately " obese woman.  She can get up from a chair without pushing with her arms.  She ambulates easily.  No major issues with thoracic kyphosis are noted.  Vitals: per nursing notes.  HEENT: Atraumatic  Neck:  Supple, no carotid bruits or adenopathy.  Back:  No spine or CVA pain.  Thorax:  No bony deformities.  Extremities:   No significant bony deformities  Skin:  No jaundice, peripheral cyanosis or lesions to suggest malignancy.  Neuro:  Alert and oriented.   No gross focal deficits.  Psychiatric:  Memory intact, mood appropriate.    DATA REVIEWED:  Additional History from Old Records Summarized (2): [place] records reviewed  Decision to Obtain Records (1): None.   Radiology Tests Summarized or Ordered (1): Previous DXA and spine x-rays reviewed  Labs Reviewed or Ordered (1): Labs reviewed  Medicine Test Summarized or Ordered (1): None.   Independent Review of EKG or X-RAY(2 each): None.    The visit lasted a total of 28 minutes face to face with the patient. Over 50% of the time was spent counseling and educating the patient about management of osteoporosis..      Dragon dictation was used for this note. Speech recognition errors are a possibility.     MEDICATIONS:  Current Outpatient Medications   Medication Sig Dispense Refill     acetaminophen (TYLENOL) 500 MG tablet Take 1,000 mg by mouth.       celecoxib (CELEBREX) 100 MG capsule Take 100 mg by mouth 2 (two) times a day.       lisinopriL (PRINIVIL,ZESTRIL) 20 MG tablet Take 1 tablet (20 mg total) by mouth daily. 90 tablet 1     multivitamin with minerals (THERA-M) 9 mg iron-400 mcg Tab tablet Take 1 tablet by mouth daily.       pantoprazole (PROTONIX) 40 MG tablet Take 40 mg by mouth daily.       Current Facility-Administered Medications   Medication Dose Route Frequency Provider Last Rate Last Dose     [START ON 8/14/2020] denosumab 60 mg (PROLIA 60 mg/ml)  60 mg Subcutaneous Once Yifan Gregorio MD Gary H Knudsen, MD

## 2021-06-10 NOTE — PROGRESS NOTES
The following steps were completed to comply with the REMS program for Prolia:  1. Ordering provider has previously reviewed information in the Medication Guide and Patient Counseling Chart, including the serious risks of Prolia  and the symptoms of each risk and have been advised to seek prompt medical attention if they have signs or symptoms of any of the serious risks.  2. Provided each patient a copy of the Medication Guide and Patient Brochure.  See MAR for administration details.   Indication: Prolia  (denosumab) is a prescription medicine used to treat osteoporosis in patients who:   Are at high risk for fracture, meaning patients who have had a fracture related to osteoporosis, or who have multiple risk factors for fracture; Cannot use another osteoporosis medicine or other osteoporosis medicines did not work well.   The timeline for early/late injections would be 4 weeks early and any time after the 6 month hannah. If a patient receives their injection late, then the subsequent injection would be 6 months from the date that they actually received the injection    Have the screening questions been asked prior to this administration? Yes

## 2021-06-10 NOTE — TELEPHONE ENCOUNTER
"Please notify Stacey of insurance coverage information for Prolia, and schedule Prolia injection as a \"nurse only \"appointment.  "

## 2021-06-10 NOTE — TELEPHONE ENCOUNTER
Medication Request  Medication name: LISINOPRIL 20MG TABLETS  Requested Pharmacy: [facility]  Reason for request: 90 DAY SUPPLY REQUEST  When did you use medication last?:  N/A  Patient offered appointment:  N/A - electronic request  Okay to leave a detailed message: no

## 2021-06-12 NOTE — PROGRESS NOTES
"ASSESSMENT & PLAN    Issues  Did have some swelling is better  Phone orthopedic felt there was bone spurs and arthritis  The night is okay  She does have some numbness in the toes  Orthopedic felt this was a different issue    Walking downhill right knee gave her problems  Arthritis was felt to be an issue  Orthopedic articulated bone-on-bone  Did receive a cortisone shot has been somewhat better    Upper back pain between her shoulders  Better when she lies down  6/10 feet     Pain in both feet  Foot surgeon at [place]  Orthotics help     Same both sides    Ortho   Said \"arthritis \"    Tops of feet   Toes go numb   Gabapentin \"no help\"  No side effects   Ortho \"secondary thing for numbness\"      Aleve \"pound them down\"  Celebrex 200 mg two times a day  Acetaminophen  No help           Stacey was seen today for pain med advice and immunizations.    Diagnoses and all orders for this visit:    Need for vaccination  -     Influenza,Quad,High Dose,PF 65 YR+    Morbid obesity (H)    Stage 3b chronic kidney disease    Pain in both feet  -     Ambulatory referral to Orthopedics    Primary osteoarthritis of right foot  -     Ambulatory referral to Orthopedics  -     traMADoL (ULTRAM) 50 mg tablet; Take 1 tablet (50 mg total) by mouth every 12 (twelve) hours.    Post-traumatic osteoarthritis of right knee    Primary osteoarthritis of knee, unspecified laterality  -     Ambulatory referral to Orthopedics  -     traMADoL (ULTRAM) 50 mg tablet; Take 1 tablet (50 mg total) by mouth every 12 (twelve) hours.    Upper back pain  -     Ambulatory referral to Orthopedics  -     traMADoL (ULTRAM) 50 mg tablet; Take 1 tablet (50 mg total) by mouth every 12 (twelve) hours.    Other orders  -     Varicella Zoster, Recombinant Vaccine IM        Patient Instructions   Nice to see you!    For the Feet and Knee and Upper Back    Let's have you see Karla Vogel; she is outstanding    Try eliminating   Red Meat and Sea Food besides [place]  Consider " Eliminating Night Shade Vegetable ( google them: Tomato/Eggplant/Peppers)    Consider:    Glucosamine/chondroitin/MSM 1500/1200/50 milligrams daily  Flaxseed oil milled 2 tablespoons daily  Hyaluronic acid 200 to 400 mg daily    Others listed at the end of this     I will have you combine tramadol plus acetaminophen  Tramadol 50 mg you may take 1 twice daily  Acetaminophen 500 mg she may take 1 twice daily    I will certify you for medical cannabis  The processes certify you and then they contact you through email    CBD oil can be purchased over-the-counter this is also consideration    Perhaps you can send me an email/my chart message in 6 weeks to see if you are improving    DanL    Osteoarthritis Less Pain   with  just  Fiber  ----From Detoxification?  Micro-biome signaling?  Polyphenols   Stabilize Nerve Pain  Apples  Black Tea  Blackberries Broccoli  Cherries (Tart)  Cherry Tomato  Cranberries  Dark Chocolate  Green Tea  Oranges  Plums  Raspberries  Red Grapes  Red Onion   Spinach  Strawberries    Lower Glycemic Index  >>lowers CRP and Increased Adiponectin (good thing)    Adequate Vitamin D helps Mg Synthesis and Acitivation  Multiform Magnesium Chard Based  Resolvins - From Omega3 Help with Inflammation From Omega3 and Omega6   Turmeric (Curcumin) 1 gram daily  Mckayla 1-3 grams day  Even just Standing Couple times an hour  Aerobic and Resistance>>>>Leptin Reduced and Adiponectin Increased and CRP Decrease     Acupuncture is Efficacious  Movement Therapy  Aerobic/Low Impact Yoga   Inyegar Improves RA and Health Quality of life  Shawn Chi  OA and RA and Teacher makes a difference   Manipulative +/- Aroma   Chiropractic     Mind Body and Meditation and Guide Imagery  Mindfulness Modulates response to stress        Glucosamine HCL  or  Chondroitin Sulfate   1500/1200 mg     Omega-3 Fats :  Alpha Linolenic Acid (Flax/Elia/Leafy Greens)  *EPA:  SMASH, Seaweed DHA/Eggs/Grass Fed Beef/Krill  (maybe better   Phospholipids absorption + contains Astaxanthin1-4 grams   DHA  Omega-6  Gamma Linolenic Acid   Oils (Borage Seed, Evening Primrose, Black Currant)  can add to DMAARD   Add Vitamin E (mixed tocopherols)   2-4 grams day      Additive effect Omega 3 + Omega 6     *SAMe  U-Pgtvyidy-vbpiqfys  400 - 1600  usual 1200 mg Daily  Pain,  May help Depression and Liver effects     MSM  1500 mg - 6000 mg  -sulfur compound  (cox2, NF KB IL-6 reduction)    [place] Soybean Unsaponifiables  300 mg  (alternative to Glucosamine Chondroitin with Shellfish Allergy)    Devil s Claw  Harpagophytum Procumbens    IL 6, IL 1B TNF a Sparrow 2   200mg Whole tuber powder, 1 month to wrk   Doloteffin  60 mg Daily  Not in Pregnancy  and not in PRegnancy    *Boswellia Yovany   Decrease 5 LOX  took 1 month   [id number]% standard Boswellia Yovany  100mg Aflapin 250 mg 5 Loxin    *Curcumin  95% Curcuminoid  500mg -2000 mg  NF Kb inh   fat Soluble BCM 95  (500mg)   Add Pepper (Piperine) increase Absorption     Mckayla 1-2 Grams daily  Decrease TNF a and IL 1B  Inhibit COX2 and LOX 5  1-2 Grams   Gingerol and Shogoals     Pycnogenol  50mg Daily    CBD    (Cannabidiol)  15-40 mg Daily     Green Lipped Mussel Extract 750 mg     Example  DaVinci Brand:  Augusto Pro  Green Lipped mussel 750 mg  + Glucosamine 450 mg  +  mg  + Boswellia yovany 50mg    [employer] Brand:   ArthroSoothe: Niacin + zinc + copper + manganese + Glucosamine + MSM + N Acetyl Cysteine + Green Lipped Mussel Extract + Boswellia yovany + Turmeric + Hyaluronic Acid + Resveratrol + Collagen    Tart Cherry (Juice)    [place] Bark Extract (aspirin parent)    Garlic 1000 mg Daily            Return in about 6 weeks (around 12/1/2020).            CHIEF COMPLAINT: Stacey Allen had concerns including pain med advice and Immunizations (shingles and influenza).    [place]: 1.............. SUBJECTIVE:  Stacey Allen is a 75 y.o. female had concerns including pain med advice and Immunizations  "(shingles and influenza).    1. Need for vaccination    2. Morbid obesity (H)    3. Stage 3b chronic kidney disease    4. Pain in both feet    5. Primary osteoarthritis of right foot    6. Post-traumatic osteoarthritis of right knee    7. Primary osteoarthritis of knee, unspecified laterality    8. Upper back pain          No Known Allergies                      SOCIAL: She  reports that she quit smoking about 40 years ago. She has never used smokeless tobacco. She reports current alcohol use. She reports that she does not use drugs.    REVIEW OF SYSTEMS:   Family history not pertinent to chief complaint or presenting problem    Review of systems otherwise negative as requested from patient, except   Those positive ROS outlined and discussed in [place].      VITALS:  Vitals:    10/20/20 1612   BP: 122/80   Pulse: 75   Resp: 16   Temp: 98.5  F (36.9  C)   TempSrc: Oral   SpO2: 99%   Weight: 213 lb (96.6 kg)   Height: 5' 5\" (1.651 m)     Wt Readings from Last 3 Encounters:   10/20/20 213 lb (96.6 kg)   07/31/20 211 lb (95.7 kg)   07/14/20 207 lb (93.9 kg)     Body mass index is 35.45 kg/m .    Physical Exam:  Arthritis changes of the anterior forefoot    Osteoarthritis changes of the IP joint of the right toe  Palpable distal pulse  No callus buildup  No lower extremity edema  Osteoarthritis changes of the knee       I spent 30 minutes with this patient.  This includes pre-visit, intra-visit and post visit work an evaluation with regards to Stacey was seen today for pain med advice and immunizations.    Diagnoses and all orders for this visit:    Need for vaccination  -     Influenza,Quad,High Dose,PF 65 YR+    Morbid obesity (H)    Stage 3b chronic kidney disease    Pain in both feet  -     Ambulatory referral to Orthopedics    Primary osteoarthritis of right foot  -     Ambulatory referral to Orthopedics  -     traMADoL (ULTRAM) 50 mg tablet; Take 1 tablet (50 mg total) by mouth every 12 (twelve) " hours.    Post-traumatic osteoarthritis of right knee    Primary osteoarthritis of knee, unspecified laterality  -     Ambulatory referral to Orthopedics  -     traMADoL (ULTRAM) 50 mg tablet; Take 1 tablet (50 mg total) by mouth every 12 (twelve) hours.    Upper back pain  -     Ambulatory referral to Orthopedics  -     traMADoL (ULTRAM) 50 mg tablet; Take 1 tablet (50 mg total) by mouth every 12 (twelve) hours.    Other orders  -     Varicella Zoster, Recombinant Vaccine IM        Ronnie Overton MD  [facility] [address] [phone]

## 2021-06-12 NOTE — TELEPHONE ENCOUNTER
Patient to receive Shingrix #2 at 10/21/20 [facility] appt.  First dose given 7/14/20.    Will Provider place order if indicated?  Thank you.

## 2021-06-12 NOTE — PATIENT INSTRUCTIONS - HE
Nice to see you!    For the Feet and Knee and Upper Back    Let's have you see Karla Vogel; she is outstanding    Try eliminating   Red Meat and Sea Food besides [place]  Consider Eliminating Night [place] Vegetable ( google them: Tomato/Eggplant/Peppers)    Consider:    Glucosamine/chondroitin/MSM 1500/1200/50 milligrams daily  Flaxseed oil milled 2 tablespoons daily  Hyaluronic acid 200 to 400 mg daily    Others listed at the end of this     I will have you combine tramadol plus acetaminophen  Tramadol 50 mg you may take 1 twice daily  Acetaminophen 500 mg she may take 1 twice daily    I will certify you for medical cannabis  The processes certify you and then they contact you through email    CBD oil can be purchased over-the-counter this is also consideration    Perhaps you can send me an email/my chart message in 6 weeks to see if you are improving    DanL    Osteoarthritis Less Pain   with  just  Fiber  ----From Detoxification?  Micro-biome signaling?  Polyphenols   Stabilize Nerve Pain  Apples  Black Tea  Blackberries Broccoli  Cherries (Tart)  Cherry Tomato  Cranberries  Dark Chocolate  Green Tea  Oranges  Plums  Raspberries  Red Grapes  Red Onion   Spinach  Strawberries    Lower Glycemic Index  >>lowers CRP and Increased Adiponectin (good thing)    Adequate Vitamin D helps Mg Synthesis and Acitivation  Multiform Magnesium Chard Based  Resolvins - From Omega3 Help with Inflammation From Omega3 and Omega6   Turmeric (Curcumin) 1 gram daily  Mckayla 1-3 grams day  Even just Standing Couple times an hour  Aerobic and Resistance>>>>Leptin Reduced and Adiponectin Increased and CRP Decrease     Acupuncture is Efficacious  Movement Therapy  Aerobic/Low Impact Yoga   Inyegar Improves RA and Health Quality of life  Shawn Chi  OA and RA and Teacher makes a difference   Manipulative +/- Aroma   Chiropractic     Mind Body and Meditation and Guide Imagery  Mindfulness Modulates response to stress        Glucosamine HCL   or  Chondroitin Sulfate   1500/1200 mg     Omega-3 Fats :  Alpha Linolenic Acid (Flax/Elia/Leafy Greens)  *EPA:  SMASH, Seaweed DHA/Eggs/Grass Fed Beef/Krill  (maybe better  Phospholipids absorption + contains Astaxanthin1-4 grams   DHA  Omega-6  Gamma Linolenic Acid   Oils (Borage Seed, Evening Primrose, Black Currant)  can add to DMAARD   Add Vitamin E (mixed tocopherols)   2-4 grams day      Additive effect Omega 3 + Omega 6     *SAMe  U-Gcqpseun-cneuezzk  400 - 1600  usual 1200 mg Daily  Pain,  May help Depression and Liver effects     MSM  1500 mg - 6000 mg  -sulfur compound  (cox2, NF KB IL-6 reduction)    [place] Soybean Unsaponifiables  300 mg  (alternative to Glucosamine Chondroitin with Shellfish Allergy)    Devil s Claw  Harpagophytum Procumbens    IL 6, IL 1B TNF a Sparrow 2   200mg Whole tuber powder, 1 month to wrk   Doloteffin  60 mg Daily  Not in Pregnancy  and not in PRegnancy    *Boswellia Yovany   Decrease 5 LOX  took 1 month   [id number]% standard Boswellia Yovany  100mg Aflapin 250 mg 5 Loxin    *Curcumin  95% Curcuminoid  500mg -2000 mg  NF Kb inh   fat Soluble BCM 95  (500mg)   Add Pepper (Piperine) increase Absorption     Mckayla 1-2 Grams daily  Decrease TNF a and IL 1B  Inhibit COX2 and LOX 5  1-2 Grams   Gingerol and Shogoals     Pycnogenol  50mg Daily    CBD    (Cannabidiol)  15-40 mg Daily     Green Lipped Mussel Extract 750 mg     Example  DaVinci Brand:  Augusto Pro  Green Lipped mussel 750 mg  + Glucosamine 450 mg  +  mg  + Boswellia yovany 50mg    [employer] Brand:   ArthroSoothe: Niacin + zinc + copper + manganese + Glucosamine + MSM + N Acetyl Cysteine + Green Lipped Mussel Extract + Boswellia yovany + Turmeric + Hyaluronic Acid + Resveratrol + Collagen    Tart Cherry (Juice)    [place] Bark Extract (aspirin parent)    Garlic 1000 mg Daily

## 2021-06-12 NOTE — TELEPHONE ENCOUNTER
RN cannot approve Refill Request    RN can NOT refill this medication medication not on med list. Last office visit: 4/19/2019 Maria D Verde MD Last Physical: 10/23/2017 Last MTM visit: Visit date not found Last visit same specialty: 3/2/2020 Roula Moreland MD.  Next visit within 3 mo: Visit date not found  Next physical within 3 mo: Visit date not found      Margo Ornelas, [facility] Triage/Med Refill 10/5/2020    Requested Prescriptions   Pending Prescriptions Disp Refills     amLODIPine (NORVASC) 5 MG tablet [Pharmacy Med Name: AMLODIPINE BESYLATE 5 MG TAB] 90 tablet 2     Sig: TAKE 1 TABLET (5 MG TOTAL) BY MOUTH DAILY.       Calcium-Channel Blockers Protocol Passed - 10/2/2020 12:10 AM        Passed - PCP or prescribing provider visit in past 12 months or next 3 months     Last office visit with prescriber/PCP: 4/19/2019 Maria D Verde MD OR same dept: 3/2/2020 Roula Moreland MD OR same specialty: 3/2/2020 Roula Moreland MD  Last physical: 10/23/2017 Last MTM visit: Visit date not found   Next visit within 3 mo: Visit date not found  Next physical within 3 mo: Visit date not found  Prescriber OR PCP: Maria D Verde MD  Last diagnosis associated with med order: 1. Essential hypertension  - amLODIPine (NORVASC) 5 MG tablet [Pharmacy Med Name: AMLODIPINE BESYLATE 5 MG TAB]; Take 1 tablet (5 mg total) by mouth daily.  Dispense: 90 tablet; Refill: 2    If protocol passes may refill for 12 months if within 3 months of last provider visit (or a total of 15 months).             Passed - Blood pressure filed in past 12 months     BP Readings from Last 1 Encounters:   07/31/20 132/64

## 2021-06-14 NOTE — PROGRESS NOTES
[facility] Daily Progress     Patient Name: Stacey Allen  Date: 12/19/2017  Visit #:4/12 through 2/22/18  PTA visit #:    PRECAUTIONS: osteoporosis with possible T10 compression fracture: no flexion exercise  Referral Diagnosis: Sciatica of left side  Referring provider: Maria D Verde  Visit Diagnosis:     ICD-10-CM    1. Acute left-sided low back pain with left-sided sciatica M54.42    2. Decreased ROM of lumbar spine M25.60    3. Generalized muscle weakness M62.81          Assessment:     Signs/sx consistent with acute left-sided low back pain with left>right radiating leg pain, most consistent with facet-related pain with likely foraminal stenosis.  Patient demonstrates understanding/independence with home program.  Patient is appropriate to continue with skilled physical therapy intervention, as indicated by initial plan of care.   Post exercise and neural mobilizations, patient reports decreased L LE sx.   Post mechanical lumbar traction today, patient reports mild low back soreness and continued tingling sensation in B LE.    Goal Status:  Pt. will be independent with home exercise program in : 4 weeks  Pt. will decrease use of medication for pain for improved quality of life in : 6 weeks  Pt. will be able to walk : 60 minutes;for community mobility;in 6 weeks;Comment  Comment:: with pain <= 2/10  Patient will stand : 60 minutes;for home chores;in 6 weeks;Comment;for work  Comment:: with pain <= 2/10  Pt. will bend: to dress;in 6 weeks;Comment  Comment:: with full ROM without pain  Patient will increase : LEFS score;in 6 weeks;for improved quality of life;by _ points  by ___ points: >= 9  Patient will decrease : CASSIE score;for improved quality of life;in 6 weeks;by _ points  by ___ points: >= 30% from IE  Patient will show increased : strength for ____;in 6 weeks  Patient will show increased strength for : house- and yard-work without increased pain    Plan / Patient Education:  "    Reassess ROM, outcomes, goals next visit prior to PCP follow-up on 17.  Consider referral to [facility] and/or neurosurgery for further consultation if progress remains to be limited next week.    Subjective:     Pain Ratin/10  Reports moderate to significant soreness for 2 days after last session from the traction, but agreeable to try again today.  Noticing more pain down the R LE now, in that it goes further down the leg and is more intense.  Pain is still very much dependent on how much I do.  Still with pain and pretty significant paresthesias into the L LE.    Objective:     Discussed progress, POC, including discussion of likely next steps including MRI of the lumbar spine with possible referral to  [facility] and/or neurosurgery for further consultation considering limited progress with PT thus far.  Passive L SLR: 60*, increased to 70* post neural mobilizations.    Treatment Today     TREATMENT MINUTES COMMENTS   Evaluation     Self-care/ Home management     Manual therapy     Neuromuscular Re-education 5 L LE: sciatic nerve mobilizations: SKTC, PIRI IR, and knee extension 2x10 reps each   Therapeutic Activity     Therapeutic Exercises 10 Ex per flow sheet   Gait training     Modality__________________ 18 Supine 90/90 lumbar traction 60\" hold, 10\" rest 50# of pull  15 min tx + 3 min set-up and patient education              Total 33    Blank areas are intentional and mean the treatment did not include these items.       Yuniel James  2017    "

## 2021-06-14 NOTE — PROGRESS NOTES
[facility] Certification Request    November 24, 2017      Patient: Stacey Allen  MR Number: [id number]  YOB: 1945  Date of Visit: 11/24/2017      Dear Dr. Maria D Verde:    Thank you for this referral.   We are seeing Stacey Allen in Physical Therapy for acute left-sided sciatica.    Medicare and/or Medicaid requires physician review and approval of the treatment plan. Please review the plan of care and verify that you agree with the therapy plan of care by co-signing this note.      Plan of Care  Authorization / Certification Start Date: 11/24/17  Authorization / Certification End Date: 02/22/18  Authorization / Certification Number of Visits: 12  Communication with: Referral Source  Patient Related Instruction: Nature of Condition;Treatment plan and rationale;Self Care instruction;Basis of treatment;Body mechanics;Posture;Precautions;Next steps;Expected outcome  Times per Week: 1-2  Number of Weeks: 6-8  Number of Visits: up to 12  Therapeutic Exercise: ROM;Stretching;Strengthening  Neuromuscular Reeducation: posture;core (LE sciatic nerve mobilizations)  Manual Therapy: soft tissue mobilization;myofascial release;joint mobilization (prn)  Modalities: TENS;hot pack (prn)    Goals:  Pt. will be independent with home exercise program in : 4 weeks  Pt. will decrease use of medication for pain for improved quality of life in : 6 weeks  Pt. will be able to walk : 60 minutes;for community mobility;in 6 weeks;Comment  Comment:: with pain <= 2/10  Patient will stand : 60 minutes;for home chores;in 6 weeks;Comment;for work  Comment:: with pain <= 2/10  Pt. will bend: to dress;in 6 weeks;Comment  Comment:: with full ROM without pain  Patient will increase : LEFS score;in 6 weeks;for improved quality of life;by _ points  by ___ points: >= 9  Patient will decrease : CASSIE score;for improved quality of life;in 6 weeks;by _ points  by ___ points: >= 30% from IE  Patient will show increased :  strength for ____;in 6 weeks  Patient will show increased strength for : house- and yard-work without increased pain      If you have any questions or concerns, please don't hesitate to call.    Sincerely,      Yuniel James, PT        Physician recommendation:     ___ Follow therapist's recommendation        ___ Modify therapy      *Physician co-signature indicates they certify the need for these services furnished within this plan and while under their care.    [facility]   Initial Evaluation    Patient Name: Stacey Allen  Date of evaluation: 11/24/2017  PRECAUTIONS: osteoporosis with possible T10 compression fracture: no flexion exercise  Referral Diagnosis: Sciatica of left side  Referring provider: Maria D Verde  Visit Diagnosis:     ICD-10-CM    1. Acute left-sided low back pain with left-sided sciatica M54.42    2. Decreased ROM of lumbar spine M25.60    3. Generalized muscle weakness M62.81        Assessment:      Pt. is appropriate for skilled PT intervention as outlined in the Plan of Care (POC).  Pt. is a good candidate for skilled PT services to improve pain levels and function.  Goals and POC established in collaboration with the patient.  Signs/sx consistent with acute left-sided low back pain with left>right radiating leg pain, most consistent with facet-related pain with possible foraminal stenosis. HEP initiated today, and patient with excellent tolerance for all ex without pain. POC to emphasize improving sciatic neural mobility, along with progressing core/glute/multifidus strength and coordination training as able, to ensure successful completion of goals and return to PLOF.    Goals:  Pt. will be independent with home exercise program in : 4 weeks  Pt. will decrease use of medication for pain for improved quality of life in : 6 weeks  Pt. will be able to walk : 60 minutes;for community mobility;in 6 weeks;Comment  Comment:: with pain <= 2/10  Patient will stand : 60  minutes;for home chores;in 6 weeks;Comment;for work  Comment:: with pain <= 2/10  Pt. will bend: to dress;in 6 weeks;Comment  Comment:: with full ROM without pain  Patient will increase : LEFS score;in 6 weeks;for improved quality of life;by _ points  by ___ points: >= 9  Patient will decrease : CASSIE score;for improved quality of life;in 6 weeks;by _ points  by ___ points: >= 30% from IE  Patient will show increased : strength for ____;in 6 weeks  Patient will show increased strength for : house- and yard-work without increased pain    Patient's expectations/goals are realistic.    Barriers to Learning or Achieving Goals:  Co-morbidities or other medical factors.  osteoporosis with possible compression fracture  Age.        Plan / Patient Instructions:        Plan of Care:   Authorization / Certification Start Date: 11/24/17  Authorization / Certification End Date: 02/22/18  Authorization / Certification Number of Visits: 12  Communication with: Referral Source  Patient Related Instruction: Nature of Condition;Treatment plan and rationale;Self Care instruction;Basis of treatment;Body mechanics;Posture;Precautions;Next steps;Expected outcome  Times per Week: 1-2  Number of Weeks: 6-8  Number of Visits: up to 12  Therapeutic Exercise: ROM;Stretching;Strengthening  Neuromuscular Reeducation: posture;core (LE sciatic nerve mobilizations)  Manual Therapy: soft tissue mobilization;myofascial release;joint mobilization (prn)  Modalities: TENS;hot pack (prn)    Plan for next visit: Review HEP. Emphasize improving sciatic neural mobility, along with progressing core/glute/multifidus strength and coordination training as able     Subjective:       History of Present Illness:    Stacey is a 72 y.o. female who presents to therapy today with complaints of subacute low back tingling with L> R buttock and leg pain, with occasional numbness and tingling. Reports left leg pain to be worse, traveling to the calf, and right leg pain to  "be less intense and traveling only to the superior hamstring area.  Onset: About 3 months ago. Reports h/o sciatica in the past about 3-4 years ago. She was working with a  at the time, and it happened to resolve on it own. With this episode of pain, she feels that the pain is similar, but much worse. Relatively insidious and gradual onset, but does feel pain to be getting worse since onset.  Duration: Constant  Worse with prolonged standing (10 min) or walking (20 min),bending, going up and down stairs, lifting heavier objects, house- and yardwork. 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  Better with pain medication. In general, she finds sitting to provide relief and tolerates much better as compared to standing or walking.  Pain Medication: Aleve 2 pills, 2x/day.  Sleep: Feels sleep to be off, but not quite sure why. Not entirely sure if pain is waking her up or not. Denies issues with rolling over in bed.  Recent DXA scan shows Possible T10 vertebral compression fracture. Will be following up with her physician in the next few weeks to do further imaging and discuss beginning medication for osteoporosis.  No previous lumbar or hip surgeries.  No pacemaker or defibrillator.    Pt seeks PT to \"able to walk my puppy.\"    PER EMR: \"She has a pain in the left hip/ buttocks that radiates down posterior left thigh but not below knee.  Sometimes radiates to right side.  She takes Aleve in am when she gets up and it helps.  Leg gets tingly at times.  Symptoms do not go below the knee. Tries stretching.  If on feet a lot, pain will come back in afternoon and she will take Tylenol.  No weakness in leg.  Left leg less flexible.  No bowel or bladder problems.  Same intensity for 6 month.  Denies any initial injury or trauma.  She has had sciatica in the past.\"    Pain Ratin  Pain rating at best: 5  Pain rating at worst: " 9  Pain description: aching, burning, dull, numbness, pain, sharp, shooting, soreness, tingling and weakness     Objective:      Patient Outcome Measures :    Modified Oswestry Low Back Pain Disablity Questionnaire  in %: 30   Scores range from 0-100%, where a score of 0% represents minimal pain and maximal function. The minimal clinically important difference is a score reduction of 12%.  Lower Extremity Functional Scale (_/80): 44   Scores range from 0-80, where a score of 80 represents maximum function. The minimal clinically important difference is a positive change of 9 points.    Examination  1. Acute left-sided low back pain with left-sided sciatica     2. Decreased ROM of lumbar spine     3. Generalized muscle weakness       Involved side: left>right  Posture Observation:      General sitting posture is  fair.  General standing posture is fair.  Cervical:  Moderate forward head  Shoulder/Thoracic complex: Moderately increased upper thoracic kyphosis  Mildly increased mid thoracic kyphosis  Lumbopelvic complex: Mildly increased lumbar lordosis    Lumbar ROM: All % of WNL  Date:      *Indicate scale AROM AROM AROM   Lumbar Flexion 90 with mild pain in B legs     Lumbar Extension 25 with sharp peripheralization of L LE pain      Right Left Right Left Right Left   Lumbar Sidebending 50 with mild pain 50 with mild pain       Lumbar Rotation 75 with mild pain 75 with mild pain       Thoracic Flexion      Thoracic Extension      Thoracic Sidebending         Thoracic Rotation           Slump Test: (+) on L LE, (-) on R LE.  SLR: 55* bilaterally, but wit (+) structural differentiation neural testing on the L.  Hip ROM  Date:      Hip ROM( ) AROM in degrees AROM in degrees AROM in degrees    Right Left Right Left Right Left   Hip Flexion (0-120 )         Hip Abduction (0-45 )         Hip External Rotation (0-50 )         Hip Internal Rotation (0-40 )         Hip Extension (0-15 )          PROM in degrees PROM in  degrees PROM in degrees    Right Left Right Left Right Left   Hip Flexion (0-120 ) 110 110       Hip Abduction (0-45 )         Hip External Rotation (0-50 ) 40 40       Hip Internal Rotation (0-40 ) 15 15       Hip Extension (0-15 )           (+) FAIR on L. N/T on R.  Palpation and PIVM: Reports mild TTP L piriformis only. (+) pain central and L unilateral PA to L5. Hypomobility noted throughout lumbar spine central PAs.    Treatment Today     TREATMENT MINUTES COMMENTS   Evaluation 20 Lumbar   Self-care/ Home management     Manual therapy     Neuromuscular Re-education     Therapeutic Activity     Therapeutic Exercises 25 -Discussed therapy diagnosis, prognosis, POC, relevant anatomy and basis for treatment.  -Reviewed and performed HEP with handouts provided.  -GTB issued.  -Reviewed to avoid bending through the back, as this is contraindicated with osteoporosis and can aggravate current compression fracture.   Gait training     Modality__________________                Total 45    Blank areas are intentional and mean the treatment did not include these items.            PT Evaluation Code: (Please list factors)  Patient History/Comorbidities: age, osteoporosis  Examination: Lumbar  Clinical Presentation: Stable  Clinical Decision Making: Low    Patient History/  Comorbidities Examination  (body structures and functions, activity limitations, and/or participation restrictions) Clinical Presentation Clinical Decision Making (Complexity)   No documented Comorbidities or personal factors 1-2 Elements Stable and/or uncomplicated Low   1-2 documented comorbidities or personal factor 3 Elements Evolving clinical presentation with changing characteristics Moderate   3-4 documented comorbidities or personal factors 4 or more Unstable and unpredictable High           Yuniel James  11/24/2017  10:55 AM

## 2021-06-14 NOTE — PROGRESS NOTES
[facility] Daily Progress     Patient Name: Stacey Allen  Date: 12/5/2017  Visit #: 2/12 through 2/22/18  PTA visit #:    PRECAUTIONS: osteoporosis with possible T10 compression fracture: no flexion exercise  Referral Diagnosis: Sciatica of left side  Referring provider: Maria D Verde  Visit Diagnosis:     ICD-10-CM    1. Acute left-sided low back pain with left-sided sciatica M54.42    2. Decreased ROM of lumbar spine M25.60    3. Generalized muscle weakness M62.81          Assessment:     Signs/sx consistent with acute left-sided low back pain with left>right radiating leg pain, most consistent with facet-related pain with possible foraminal stenosis.  Patient demonstrates understanding/independence with home program.  Patient is appropriate to continue with skilled physical therapy intervention, as indicated by initial plan of care.   Post exercise, patient reports decreased L LE sx. Decreased LBP with lumbopelvic harmonics. Unchanged pain with lumbar distraction and sciatic nerve glides.    Goal Status:  Pt. will be independent with home exercise program in : 4 weeks  Pt. will decrease use of medication for pain for improved quality of life in : 6 weeks  Pt. will be able to walk : 60 minutes;for community mobility;in 6 weeks;Comment  Comment:: with pain <= 2/10  Patient will stand : 60 minutes;for home chores;in 6 weeks;Comment;for work  Comment:: with pain <= 2/10  Pt. will bend: to dress;in 6 weeks;Comment  Comment:: with full ROM without pain  Patient will increase : LEFS score;in 6 weeks;for improved quality of life;by _ points  by ___ points: >= 9  Patient will decrease : CASSIE score;for improved quality of life;in 6 weeks;by _ points  by ___ points: >= 30% from IE  Patient will show increased : strength for ____;in 6 weeks  Patient will show increased strength for : house- and yard-work without increased pain    Plan / Patient Education:     Continue current ex, progressing as  able.  Trial 4ped opposite UE/LE lift and PPT 90/90 toe taps next visit.  Continue R SL lumbopelvic harmonics.    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  Has not gotten any worse, but also not any better.  Doing ex 1x/day.    Objective:     Reviewed HEP with min cueing for correct performance. Updated HEP per flow sheet.    Treatment Today     TREATMENT MINUTES COMMENTS   Evaluation     Self-care/ Home management     Manual therapy 9 -R SL: grade II lumbopelvic harmonics x5 min (no trunk ROT)  -Supine L LE long axis distraction with hip blocked in IR for greater pull on lumbar spine for lumbar distraction   Neuromuscular Re-education 5 L LE: sciatic nerve mobilizations: SKTC, PIRI IR, and knee extension 2x10 reps each   Therapeutic Activity     Therapeutic Exercises 14 Ex per flow sheet   Gait training     Modality__________________                Total 28    Blank areas are intentional and mean the treatment did not include these items.       Yuniel James  2017

## 2021-06-14 NOTE — PROGRESS NOTES
[facility] Daily Progress     Patient Name: Stacey Allen  Date: 12/12/2017  Visit #: 3/12 through 2/22/18  PTA visit #:    PRECAUTIONS: osteoporosis with possible T10 compression fracture: no flexion exercise  Referral Diagnosis: Sciatica of left side  Referring provider: Maria D Verde  Visit Diagnosis:     ICD-10-CM    1. Acute left-sided low back pain with left-sided sciatica M54.42    2. Decreased ROM of lumbar spine M25.60    3. Generalized muscle weakness M62.81          Assessment:     Signs/sx consistent with acute left-sided low back pain with left>right radiating leg pain, most consistent with facet-related pain with likely foraminal stenosis.  Patient demonstrates understanding/independence with home program.  Patient is appropriate to continue with skilled physical therapy intervention, as indicated by initial plan of care.   Post exercise and neural mobilizations, patient reports decreased L LE sx.   Post mechanical lumbar traction, patient reports mild soreness in the low back, but also with further reduction in L LE paresthesias. No pain in L LE present by the end of the session.    Goal Status:  Pt. will be independent with home exercise program in : 4 weeks  Pt. will decrease use of medication for pain for improved quality of life in : 6 weeks  Pt. will be able to walk : 60 minutes;for community mobility;in 6 weeks;Comment  Comment:: with pain <= 2/10  Patient will stand : 60 minutes;for home chores;in 6 weeks;Comment;for work  Comment:: with pain <= 2/10  Pt. will bend: to dress;in 6 weeks;Comment  Comment:: with full ROM without pain  Patient will increase : LEFS score;in 6 weeks;for improved quality of life;by _ points  by ___ points: >= 9  Patient will decrease : CASSIE score;for improved quality of life;in 6 weeks;by _ points  by ___ points: >= 30% from IE  Patient will show increased : strength for ____;in 6 weeks  Patient will show increased strength for : house- and  "yard-work without increased pain    Plan / Patient Education:     Continue current ex, progressing as able.  Review newly progressed ex and continue lumbar traction.    Subjective:     Pain Ratin/10 intermittent low back and L LE pain and paresthesias to the toes.  Did quite a bit off work around the house on  moving Xmas decorations.  Still gets the best relief with sitting or lying down, feeling the best first thing in the AM after waking up.  Around 3PM today is when the back and leg started to bother her and she knew she should sit down and rest.    Objective:     Updated HEP per flow sheet.  Passive L SLR: 60*, increased to 80* post neural mobilizations.    Treatment Today     TREATMENT MINUTES COMMENTS   Evaluation     Self-care/ Home management     Manual therapy     Neuromuscular Re-education 5 L LE: sciatic nerve mobilizations: SKTC, PIRI IR, and knee extension 2x10 reps each   Therapeutic Activity     Therapeutic Exercises 12 Ex per flow sheet   Gait training     Modality__________________ 18 Supine 90/90 lumbar traction 60\" hold, 10\" rest 60# of pull  15 min tx + 3 min set-up and patient education              Total 35    Blank areas are intentional and mean the treatment did not include these items.       Yuniel James  2017    "

## 2021-06-15 ENCOUNTER — RECORDS - HEALTHEAST (OUTPATIENT)
Dept: ADMINISTRATIVE | Facility: OTHER | Age: 76
End: 2021-06-15

## 2021-06-15 PROBLEM — D12.3 BENIGN NEOPLASM OF TRANSVERSE COLON: Status: ACTIVE | Noted: 2018-07-19

## 2021-06-15 PROBLEM — K21.00 GASTRO-ESOPHAGEAL REFLUX DISEASE WITH ESOPHAGITIS: Status: ACTIVE | Noted: 2019-02-15

## 2021-06-15 NOTE — PROGRESS NOTES
Post injection F/U  --1/22/18  --B/L L4-L5 TFESI  --States of 80% relief in the B/L low back and right leg per pt  --Continue to have pain/numbness down the left anterior/posterior leg to the ankle x over 4 months, no change  --Rates pain 6/10  --PT x 5 sessions [facility] 12/26/17 for lumbar    Medication  --Gabapentin 100 mg (2-0-2)

## 2021-06-15 NOTE — PROGRESS NOTES
[facility] Daily Progress     Patient Name: Stacey Allen  Date: 12/26/2017  Visit #: 5/12 through 2/22/18  PTA visit #:    PRECAUTIONS: osteoporosis with possible T10 compression fracture: no flexion exercise  Referral Diagnosis: Sciatica of left side  Referring provider: Maria D Verde*  Visit Diagnosis:     ICD-10-CM    1. Acute left-sided low back pain with left-sided sciatica M54.42    2. Decreased ROM of lumbar spine M25.60    3. Generalized muscle weakness M62.81          Assessment:     Signs/sx consistent with acute left-sided low back pain with left>right radiating leg pain, most consistent with facet-related pain with likely foraminal stenosis.  *Despite excellent HEP compliance and understanding, patient without significant change in pain or functional status from start of care. Encouraged patient to follow-up with her referring physician, with recommendation for further MRI imaging and/or [facility] and/or neurosurgery consult as appropriate.     Goal Status:  Pt. will be independent with home exercise program in : 4 weeks. MET  Pt. will decrease use of medication for pain for improved quality of life in : 6 weeks. NOT MET  Pt. will be able to walk : 60 minutes;for community mobility;in 6 weeks;Comment  Comment:: with pain <= 2/10. NOT MET  Patient will stand : 60 minutes;for home chores;in 6 weeks;Comment;for work  Comment:: with pain <= 2/10. NOT MET  Pt. will bend: to dress;in 6 weeks;Comment  Comment:: with full ROM without pain. NOT MET  Patient will increase : LEFS score;in 6 weeks;for improved quality of life;by _ points  by ___ points: >= 9. NOT MET  Patient will decrease : CASSIE score;for improved quality of life;in 6 weeks;by _ points  by ___ points: >= 30% from IE. NOT MET  Patient will show increased : strength for ____;in 6 weeks  Patient will show increased strength for : house- and yard-work without increased pain. NOT MET    Plan / Patient Education:     Pt plans to  "follow-up with her PCP on 17.  Consider referral to MRI and [facility] and/or neurosurgery considering lack of progress with conservative PT.  Plan to hold chart open x30 days for follow-up prn.    Subjective:     Pain Ratin/10 low back and L LE currently. 4/10 at best, 8/10 at worst over the last week.  Reports pain to be much better this week compared to last since she is not having to stand all day at work now, but the tingling is still bothersome.  Still with a constant tingling in the L LE (was only intermittent at the start).  Aleve a few time/day.  Reports approximately 30 min standing and ambulation tolerance before pain starts to increase in the back and L LE and she has to sit down.     Objective:     Discussed/reviewed progress, POC, including discussion of likely next steps including MRI of the lumbar spine with possible referral to  Spine Center and/or neurosurgery for further consultation considering limited progress with PT thus far. Encouraged continued HEP performance.  CASSIE: 28% - 30% on IE.  LEFS: 42/80 - 44/8- on IE.  Passive SLR: 70* bilaterally, but with + neural tension on L. No crossover response.  Lumbar ROM: All % of WNL  Date:      *Indicate scale AROM AROM AROM   Lumbar Flexion 90     Lumbar Extension 50      Right Left Right Left Right Left   Lumbar Sidebending 75 50 and PF       Lumbar Rotation 75 75 and PF       Thoracic Flexion      Thoracic Extension      Thoracic Sidebending         Thoracic Rotation             Treatment Today     TREATMENT MINUTES COMMENTS   Evaluation     Self-care/ Home management     Manual therapy     Neuromuscular Re-education 5 L LE: sciatic nerve mobilizations: SKTC, PIRI IR, and knee extension 2x10 reps each   Therapeutic Activity     Therapeutic Exercises 11 Outcomes assessment   Gait training     Modality__________________ 18 Supine 90/90 lumbar traction 60\" hold, 10\" rest 50# of pull  15 min tx + 3 min set-up and patient " education              Total 34    Blank areas are intentional and mean the treatment did not include these items.       Yuniel Jacob  12/26/2017    [facility] Discharge Summary  Patient Name: Stacey Allen  Date: 1/19/2018  Referral Diagnosis: Sciatica of left side  Referring provider: Maria D Verde  Visit Diagnosis:   1. Acute left-sided low back pain with left-sided sciatica     2. Decreased ROM of lumbar spine     3. Generalized muscle weakness         Goals:  Pt. will be independent with home exercise program in : 4 weeks. MET  Pt. will decrease use of medication for pain for improved quality of life in : 6 weeks. NOT MET  Pt. will be able to walk : 60 minutes;for community mobility;in 6 weeks;Comment  Comment:: with pain <= 2/10. NOT MET  Patient will stand : 60 minutes;for home chores;in 6 weeks;Comment;for work  Comment:: with pain <= 2/10. NOT MET  Pt. will bend: to dress;in 6 weeks;Comment  Comment:: with full ROM without pain. NOT MET  Patient will increase : LEFS score;in 6 weeks;for improved quality of life;by _ points  by ___ points: >= 9. NOT MET  Patient will decrease : CASSIE score;for improved quality of life;in 6 weeks;by _ points  by ___ points: >= 30% from IE. NOT MET  Patient will show increased : strength for ____;in 6 weeks  Patient will show increased strength for : house- and yard-work without increased pain. NOT MET    Patient was seen for 5 visits from 11/24/17 to 12/26/17 with 0 missed appointments.  Please see above assessment and plan.  Pt is currently in the process of following up with the [facility] to trial TFESI; otherwise, will likely require surgical consult.    Therapy will be discontinued at this time.  The patient will need a new referral to resume.    Thank you for your referral.  Yuniel Fabianabe  1/19/2018  10:59 AM

## 2021-06-15 NOTE — PROGRESS NOTES
Assessment:     Diagnoses and all orders for this visit:    Spondylolisthesis, lumbar region    Lumbar degenerative disc disease    Lumbar facet arthropathy    Lumbar stenosis    Lumbar radiculitis    Bilateral low back pain       Stacey Allen is a 72 y.o. y.o. female with past medical history significant for hypercholesterolemia, chronic reflux esophagitis, epistaxis, elevated liver enzymes, osteoporosis, large intestine benign polyp who presents today for follow-up regarding     -Post bilateral L4-5 TF RY in which she received 80% relief of back pain and leg pain.  Right L5 radiculopathy still significant with weakness on exam left dorsi/plantar flexors.    -L4-5 spinal listhesis with significant central canal stenosis and bilateral lateral recess stenosis.  Moderate right and mild left foraminal stenosis.     Plan:     A shared decision making plan was used. The patient's values and choices were respected. Prior medical records from 1/3/18 were reviewed today. The following represents what was discussed and decided upon by the provider and the patient.        -DIAGNOSTIC TESTS: Images were personally reviewed and interpreted.   --Ordered lumbar flexion extension x-ray to evaluate spondylolisthesis stability.  --Also ordered left lower extremity EMG evaluate radiculopathy.  --Lumbar spine MRI 1/9/2018 does reveal L4-5-6 millimeters spondylolisthesis with discogenic marrow edema, advanced facet arthropathy with severe central canal and right greater than left lateral recess stenosis.  Moderate right and mild left foraminal stenosis.  L5-S1 moderate to advanced disc height loss with no central stenosis, mild bilateral foraminal stenosis not noted on radiology report however).  --Lumbar spine x-ray 12/27/2017 reveals 12 mm anterolisthesis L4-5 and 6 mm L5-S1.  Moderate to marked disc height loss at L4-5 and marked at L5-S1 with moderate to marked lumbar facet arthropathy.  --Thoracic spine x-ray reveals  exaggeration of thoracic kyphosis without compression fracture.  Moderate midthoracic degenerative disc disease.    -INTERVENTIONS: Can consider a left L5-S1 TF RY pending EMG results.  If active radiculopathy noted however would also recommend surgical evaluation in order to prevent any chronic weakness/radiculopathy..    -MEDICATIONS: Did advise patient continue gabapentin in the meantime, she has some mild loopiness therefore we will hold off on increasing, if that resolves I would recommend increasing to 2 tablets 3 times daily if tolerable.  If no benefit we can wean off as well however.  -No further medications prescribed today.  Discussed side effects of medications and proper use. Patient verbalized understanding.    -PHYSICAL THERAPY: Discussed with patient that if lumbar flexion-extension x-ray is stable we could consider lumbar MedX program for more significant core strengthening as well if no radiculopathy noted on EMG.  Discussed the importance of core strengthening, ROM, stretching exercises with the patient and how each of these entities is important in decreasing pain.  Explained to the patient that the purpose of physical therapy is to teach the patient a home exercise program.  These exercises need to be performed every day in order to decrease pain and prevent future occurrences of pain.        -PATIENT EDUCATION:  20 minutes of total visit time was spent face to face with the patient today, 60 % of the visit was spent on counseling, education, and coordinating care.   -5 minutes spent outside of visit time, non-face-to-face time, reviewing chart.    -FOLLOW UP: Follow-up for EMG with Dr. Rice.  Advised to contact clinic if symptoms worsen or change.    Subjective:     Stacey Allen is a 72 y.o. female who presents today for follow-up regarding 2 weeks post bilateral L4-5 TF RY in which she did receive 80% relief of her back  pain as well as leg pain.    She currently denies any right leg  symptoms however does have still pain into the posterior buttock, posterior and lateral thigh and posterior and anterior shin with associated numbness and tingling that is currently a 6/10.  She does feel perceived weakness on that left lower extremity as well and notices dragging her foot at times.    Denies any bowel or bladder dysfunction, denies any recent trips or falls however.    -Treatment to Date: No prior spinal surgery or spinal injection.  Physical therapy ×5 sessions with no benefit.  Traction aggravated her pain.    Bilateral L4-5 TF RY 1/22/2018 with 80% relief of bilateral low back and radicular pain.  Still residual LEFT radiculitis noted.     -Medications:  Over-the-counter Aleve, 2 tablets in the morning and occasionally 2 in the afternoon with some relief.  Gabapentin 100 mg 2-0-2 with some dizzines loopiness s/ side effec, however tolerable.    Patient Active Problem List   Diagnosis     Hypercholesterolemia     Decrease In Height     Chronic Reflux Esophagitis     Epistaxis     Benign Polyps Of The Large Intestine     Bartholin Gland Cyst     Elevated liver enzymes     Osteoporosis       Current Outpatient Prescriptions on File Prior to Encounter   Medication Sig Dispense Refill     gabapentin (NEURONTIN) 100 MG capsule Take 300 mg by mouth 3 (three) times a day. Follow Gabapentin Dosing chart given 270 capsule 3     MULTIVIT-MINERALS/FERROUS FUM (MULTI VITAMIN ORAL) Take 1 tablet by mouth daily.       pantoprazole (PROTONIX) 40 MG tablet Take 40 mg by mouth daily.       No current facility-administered medications on file prior to encounter.        No Known Allergies    Past Medical History:   Diagnosis Date     Hyperlipidemia      Hypertension      Osteoporosis      Stomach ulcer         Review of Systems  ROS: Positive for numbness and tingling, perceived left lower extremity weakness, dizziness.  Specifically negative for bowel/bladder dysfunction, balance changes, headache, dizziness,  foot drop, fevers, chills, appetite changes, nausea/vomiting, unexplained weight loss. Otherwise 13 systems reviewed are negative. Please see the patient's intake questionnaire from today for details.    Reviewed Social, Family, Past Medical and Past Surgical history with patient, no significant changes noted since prior visit.     Objective:     /77 (Patient Site: Right Arm, Patient Position: Sitting)  Pulse 71  Wt 201 lb (91.2 kg)  LMP 07/08/1992  SpO2 95%  BMI 33.45 kg/m2    PHYSICAL EXAMINATION:    --CONSTITUTIONAL: Well developed, well nourished, healthy appearing individual.  --PSYCHIATRIC: Appropriate mood and affect. No difficulty interacting due to temper, social withdrawal, or memory issues.  --SKIN: Lumbar region is dry and intact.   --RESPIRATORY: Normal rhythm and effort. No abnormal accessory muscle breathing patterns noted.   --MUSCULOSKELETAL:  Normal lumbar lordosis noted, no lateral shift.  --GROSS MOTOR: Easily arises from a seated position. Gait is non-antalgic  --LUMBAR SPINE:  Inspection reveals no evidence of deformity. Range of motion limited in lumbar flexion, extension, due to pain.  No tenderness to palpation lumbar spine. Straight leg raising in the supine position is negative to radicular pain. Sciatic notch non-tender.   --LOWER EXTREMITY MOTOR TESTING:  Plantar flexion left 4/5, right 5/5   Dorsiflexion left 4/5, right 5/5   Great toe MTP extension left 5/5, right 5/5  Knee flexion left 5/5, right 5/5  Knee extension left 5/5, right 5/5   Hip flexion left 5/5, right 5/5  Hip abduction left 5/5, right 5/5  Hip adduction left 5/5, right 5/5   --HIPS: Full range of motion bilaterally.   --NEUROLOGIC: Bilateral patellar and achilles reflexes are physiologic and symmetric. Sensation to light touch is intact in the bilateral L4, L5, and S1 dermatomes.    RESULTS:   Imaging: Lumbar spine imaging was reviewed today. The images were shown to the patient and the findings were  explained using a spine model.      Mr Lumbar Spine Without Contrast  Result Date: 1/9/2018  INDICATION: Lower back pain. Bilateral L5-S1 radiculitis, left worse than right. Spondylolisthesis. TECHNIQUE: Unenhanced. COMPARISON: Plain films 12/27/2017.   FINDINGS: Five lumbar vertebrae are assumed. Mild convex left lumbar curvature.   T11-T12: Disc dehydration. Slight disc space loss. Mild interbody spurring. Small shallow central disc protrusion. Facet joints negative. No significant central canal or foraminal stenosis.   T12-L1: Disc dehydration. Mild disc space loss. Small to moderate-sized right paracentral disc protrusion mildly effaces the ventral thecal sac on the right. Facet joints negative. No significant central canal stenosis. No foraminal stenosis.   L1-L2: Disc dehydration. Mild disc space loss. Annular bulge and small shallow central to right paracentral disc protrusion. Facet joints negative. No central canal or foraminal stenosis.   L2-L3: Normal disc signal and height. Facet joints negative. No significant central canal or foraminal stenosis.   L3-L4: Disc dehydration. Mild disc space loss. Mild annular bulge. Mild degenerative facet arthropathy. Ligamentum flavum thickening. No central canal stenosis. No foraminal stenosis.   L4-L5: 6 mm of anterolisthesis of L4 on L5. Disc dehydration. Moderate disc space loss. Mild discogenic marrow edema. Mild annular bulge. Advanced degenerative facet arthropathy. Ligamentum flavum thickening. Severe central canal stenosis and right worse  than left lateral recess stenosis. Moderate right and mild left foraminal stenosis.   L5-S1: Disc dehydration. Moderate to advanced disc space loss. Mild interbody spurring. Annular bulge. Mild degenerative facet arthropathy. No significant central canal stenosis. No foraminal stenosis.   Conus and intraspinal contents otherwise negative.   No significant marrow signal abnormality.   No significant paravertebral soft tissue  abnormality. Colonic diverticulosis. Hiatal hernia.   CONCLUSION:   1.  Degenerative changes lumbar spine as described above.   2.  Low-grade degenerative spondylolisthesis L4 on L5.   3.  At L4-L5, there is severe central canal stenosis, right worse than left lateral recess stenosis, and moderate right and mild left foraminal stenosis due to the degenerative changes and spondylolisthesis.      Xr Thoracic Spine 2 Vws  Result Date: 12/28/2017  INDICATION: Age-related osteoporosis without current pathological fracture. COMPARISON: None. FINDINGS: Mild right convexity mid thoracic curvature and exaggeration of the normal thoracic kyphosis. No definite acute compression fracture deformity though the evaluation of the upper thoracic levels is limited by overlying soft tissue and shoulder artifact. Moderate midthoracic degenerative disc disease. Visualized lungs are clear.         Xr Lumbar Spine 2 Or 3 Vws  Result Date: 12/28/2017  INDICATION: Sciatica, left side. COMPARISON: None. FINDINGS: Nomenclature based on 5 lumbar-type vertebral bodies. 12 mm anterolisthesis of L4 on L5 and 6 mm anterolisthesis L5 on S1. Alignment is otherwise normal. Vertebral body heights are maintained. Moderate to marked loss of disc space height at L4-L5 and marked changes at L5-S1. Moderate to marked lower lumbar facet arthropathy. Mild degenerative change about the hip and sacroiliac joints.

## 2021-06-15 NOTE — PROGRESS NOTES
New patient evaluation  --Referred by PCP  --C/O B/L buttock pain, radiates down the B/L posterior leg to the ankle x 3-4 months  --Tingling in the B/L leg to the feet x 3-4 months, now more constant on the left leg per pt  --No known injury  --Rates pain 5/10  --PT x 5 sessions [facility] 12/26/17 for LBP, no improvement per pt (worsened with traction per pt)  --No hx of lumbar surgery, injection, chiro    Medication  --Aleve 220 mg 1 tab BID PRN OTC per pt

## 2021-06-15 NOTE — PROGRESS NOTES
ASSESSMENT: Stacey Allen is a 72 y.o. female who presents for consultation at the request of [facility] PCP Maria D Verde MD, with a past medical history significant for hypercholesterolemia, chronic reflux esophagitis, epistaxis, elevated liver enzymes, osteoporosis, large intestine benign polyp, who presents today for new patient evaluation of bilateral low back pain and bilateral radicular pain L5 dermatomal pattern left greater than right ongoing for the last 3-4 months with no known injury.    Paresthesias bilateral lower extremity left greater than right also in L5 pattern.    Patient is neurologically intact on exam, does have increased pain with forward flexion as well as extension.    Discussed the nature of conservative treatment but also the likely need for surgical intervention at some point. By definition, the definitive treatment for lumbar spondylolisthesis is lumbar fusion. If symptoms are not responsive to epidural steroid injection and other conservative treatment options, it is appropriate to refer for lumbar surgery and the nature of such surgery was discussed with the patient today. Patient verbalized understanding.     CASSIE Score: 34  NDI Score: 6    WHO 5: 16     PHQ-2: 0    Diagnoses and all orders for this visit:    Spondylolisthesis, lumbar region  -     MR Lumbar Spine Without Contrast; Future; Expected date: 1/3/18    Lumbar degenerative disc disease  -     MR Lumbar Spine Without Contrast; Future; Expected date: 1/3/18    Lumbar facet arthropathy  -     MR Lumbar Spine Without Contrast; Future; Expected date: 1/3/18    Lumbar radiculitis  -     MR Lumbar Spine Without Contrast; Future; Expected date: 1/3/18  -     gabapentin (NEURONTIN) 100 MG capsule; Take 300 mg by mouth 3 (three) times a day. Follow Gabapentin Dosing chart given  Dispense: 270 capsule; Refill: 3    Bilateral low back pain  -     MR Lumbar Spine Without Contrast; Future; Expected date: 1/3/18  -     gabapentin  (NEURONTIN) 100 MG capsule; Take 300 mg by mouth 3 (three) times a day. Follow Gabapentin Dosing chart given  Dispense: 270 capsule; Refill: 3      PLAN:  Reviewed spine anatomy and disease process. Discussed diagnosis and treatment options with the patient today. A shared decision making model was used.  The patient's values and choices were respected. The following represents what was discussed and decided upon by the provider and the patient.      -DIAGNOSTIC TESTS:  Images were personally reviewed and interpreted.   --Ordered lumbar spine MRI to further evaluate possible nerve compression likely due to L4-5 and L5-S1 spondylolisthesis.  --Lumbar spine x-ray 12/27/2017 reveals 12 mm anterolisthesis L4-5 and 6 mm L5-S1.  Moderate to marked disc height loss at L4-5 and marked at L5-S1 with moderate to marked lumbar facet arthropathy.  --Thoracic spine x-ray reveals exaggeration of thoracic kyphosis without compression fracture.  Moderate midthoracic degenerative disc disease.    -PHYSICAL THERAPY: Advised patient to continue with home exercise program from prior core strengthening exercises/PT sessions.  Discussed the importance of core strengthening, ROM, stretching exercises with the patient and how each of these entities is important in decreasing pain.  Explained to the patient that the purpose of physical therapy is to teach the patient a home exercise program.  These exercises need to be performed every day in order to decrease pain and prevent future occurrences of pain.        -MEDICATIONS: Did prescribe gabapentin 100 mg to titrate up to 3 tablets 3 times daily as tolerated for radicular pain.  Discussed side effects of medications and proper use. Patient verbalized understanding.    -INTERVENTIONS: Consider bilateral TF RY pending MRI review and results with gabapentin therapy, as she is gotten no relief with physical therapy.    -PATIENT EDUCATION:  40 minutes of total visit time was spent face to face  with the patient today, 60 % of the visit was spent on counseling, education, and coordinating care.   -10 minutes spent outside of visit time, non-face-to-face time, reviewing chart.    -FOLLOW-UP:   Follow-up after obtaining lumbar spine MRI to review images and ongoing plan.    Advised pt to call the [facility] if symptoms worsen or you have problems controlling bladder and bowel function.   ______________________________________________________________________    SUBJECTIVE:  HPI:  Stacey Allen  Is a 72 y.o. female who presents today for new patient evaluation of low back pain back to pain across lumbosacral junction that radiates to the buttock region and lateral thigh as well as lateral calf and posterior thigh and posterior calf with associated numbness and tingling more intense on the left that is ongoing for the last 3-4 months with no known injury that is progressive.  She reports that currently her pain is a 5/10 but it is a good day and her pain does worsen and become more significant debilitating.  She reports that she also has numbness and tingling that off and on bilateral lower extremities that has been more constant on the left leg recently.  She reports perceived weakness in the left lower extremity however denies any episodes where her leg gives out on her.  Denies bowel or bladder dysfunction, denies balance changes.  She reports she had a history of sciatica in the past but did well with physical therapy, this episode feels slightly    More intense and no relief with physical therapy.    -Treatment to Date: No prior spinal surgery or spinal injection.  Physical therapy ×5 sessions with no benefit.  Traction aggravated her pain.    -Medications:  Over-the-counter Aleve, 2 tablets in the morning and occasionally 2 in the afternoon with some relief.    Current Outpatient Prescriptions on File Prior to Encounter   Medication Sig Dispense Refill     MULTIVIT-MINERALS/FERROUS FUM (MULTI VITAMIN  ORAL) Take 1 tablet by mouth daily.       pantoprazole (PROTONIX) 40 MG tablet Take 40 mg by mouth daily.       No current facility-administered medications on file prior to encounter.        No Known Allergies    Past Medical History:   Diagnosis Date     Hyperlipidemia      Hypertension      Osteoporosis      Stomach ulcer         Patient Active Problem List   Diagnosis     Hypercholesterolemia     Decrease In Height     Chronic Reflux Esophagitis     Epistaxis     Benign Polyps Of The Large Intestine     Bartholin Gland Cyst     Elevated liver enzymes     Osteoporosis       Past Surgical History:   Procedure Laterality Date     MO  DELIVERY ONLY      Description:  Section;  Recorded: 2011;     MO EXPLORATORY OF ABDOMEN      Description: Exploratory Laparotomy;  Recorded: 2011;     MO REMOVE TONSILS/ADENOIDS,<11 Y/O      Description: Tonsillectomy With Adenoidectomy;  Recorded: 2011;       Family History   Problem Relation Age of Onset     Colon cancer Mother 74     Diabetes Mother      Cancer Sister 62     Lung     Aneurysm Father        SOCIAL HX: Patient is  and currently single.  Patient does work part-time as a .  Patient denies smoking/tobacco history, occasionally drinks alcohol but denies being a heavy drinker, denies recreational drug use.    ROS: Positive for poor sleep quality, back pain, joint pain, leg pain.  Specifically negative for bowel/bladder dysfunction, balance changes, headache, dizziness, foot drop, fevers, chills, appetite changes, nausea/vomiting, unexplained weight loss. Otherwise 13 systems reviewed are negative. Please see the patient's intake questionnaire from today for details.    OBJECTIVE:  /72 (Patient Site: Right Arm, Patient Position: Sitting)  Pulse 73  Temp 97.9  F (36.6  C) (Oral)   Wt 205 lb (93 kg)  LMP 1992  SpO2 96%  BMI 34.11 kg/m2    PHYSICAL EXAMINATION:    --CONSTITUTIONAL:  Vital signs as  above.  No acute distress.  The patient is well nourished and well groomed.  --PSYCHIATRIC:  Appropriate mood and affect. The patient is awake, alert, oriented to person, place, time and answering questions appropriately with clear speech.    --SKIN:  Skin over the face, bilateral lower extremities, and posterior torso is clean, dry, intact without rashes.    --RESPIRATORY: Normal rhythm and effort. No abnormal accessory muscle breathing patterns noted.   --ABDOMINAL:  Soft, non-distended, non-tender throughout all quadrants.  No pulsatile mass palpated in the left lower quadrant.  --STANDING EXAMINATION:  Normal lumbar lordosis noted, no lateral shift.  --MUSCULOSKELETAL: Lumbar spine inspection reveals no evidence of deformity. Range of motion is limited in both lumbar flexion and extension due to pain. No tenderness to palpation. Straight leg raising in the seated position is negative to radicular pain. Sciatic notch non-tender.  --GROSS MOTOR: Gait is non-antalgic. Easily arises from a seated position. Toe walking and heel walking are normal without significant difficulty.    --LOWER EXTREMITY MOTOR TESTING:  Plantar flexion left 5/5, right 5/5   Dorsiflexion left 5/5, right 5/5   Great toe MTP extension left 5/5, right 5/5  Knee flexion left 5/5, right 5/5  Knee extension left 5/5, right 5/5   Hip flexion left 5/5, right 5/5  Hip abduction left 5/5, right 5/5  Hip adduction left 5/5, right 5/5   --HIPS: Full range of motion bilaterally. Negative FABERs on the involved lower extremity.   --NEUROLOGICAL:  2/4 patellar, medial hamstring, and achilles reflexes bilaterally.  Sensation to light touch is intact in the bilateral L4, L5, and S1 dermatomes. Babinski is negative. No clonus.  --VASCULAR:  2/4 dorsalis pedis and posterior tibialsi pulses bilaterally.  Bilateral lower extremities are warm.  There is no pitting edema of the bilateral lower extremities.    RESULTS: Prior medical records from 10/23/2017 to  current [facility] were reviewed today.    Imaging: Lumbar spine Imaging was personally reviewed and interpreted today. The images were shown to the patient and the findings were explained using a spine model.      Xr Thoracic Spine 2 Vws  Result Date: 12/28/2017  INDICATION: Age-related osteoporosis without current pathological fracture. COMPARISON: None. FINDINGS: Mild right convexity mid thoracic curvature and exaggeration of the normal thoracic kyphosis. No definite acute compression fracture deformity though the evaluation of the upper thoracic levels is limited by overlying soft tissue and shoulder artifact. Moderate midthoracic degenerative disc disease. Visualized lungs are clear.       Xr Lumbar Spine 2 Or 3 Vws  Result Date: 12/28/2017  INDICATION: Sciatica, left side. COMPARISON: None. FINDINGS: Nomenclature based on 5 lumbar-type vertebral bodies. 12 mm anterolisthesis of L4 on L5 and 6 mm anterolisthesis L5 on S1. Alignment is otherwise normal. Vertebral body heights are maintained. Moderate to marked loss of disc space height at L4-L5 and marked changes at L5-S1. Moderate to marked lower lumbar facet arthropathy. Mild degenerative change about the hip and sacroiliac joints.

## 2021-06-15 NOTE — PROGRESS NOTES
"Prolia Injection Phone Screen      Screening questions have been asked 2-3 days prior to administration visit for Prolia. If any questions are answered with \"Yes,\" this phone encounter were will routed to ordering provider for further evaluation.     1.  When was the last injection?  8/25/20    2.  Has insurance for this injection been verified?  Yes    3.  Did you experience any new onset achiness or rashes that lasted for over a month with your previous Prolia injection?   No    4.  Do you have a fever over 101?F or a new deep cough that is unusual for you today? No    5.  Have you started any new medications in the last 6 months that you were told could affect your immune system? These may have been prescribed by oncologist, transplant, rheumatology, or dermatology.   No    6.  In the last 6 months have you have gastric bypass or parathyroid surgery?   No    7.  Do you plan dental work requiring drilling into the bone such as implants/extractions or oral surgery in the next 2-3 months?   No    Patient informed if symptoms discussed above present prior to their administration appointment, they are to notify clinic immediately.     The following steps were completed to comply with the REMS program for Prolia:  1. Ordering provider has previously reviewed information in the Medication Guide and Patient Counseling Chart, including the serious risks of Prolia  and the symptoms of each risk and have been advised to seek prompt medical attention if they have signs or symptoms of any of the serious risks.  2. Provided each patient a copy of the Medication Guide and Patient Brochure.  See MAR for administration details.   Indication: Prolia  (denosumab) is a prescription medicine used to treat osteoporosis in patients who:   Are at high risk for fracture, meaning patients who have had a fracture related to osteoporosis, or who have multiple risk factors for fracture; Cannot use another osteoporosis medicine or other " osteoporosis medicines did not work well.   The timeline for early/late injections would be 4 weeks early and any time after the 6 month hannah. If a patient receives their injection late, then the subsequent injection would be 6 months from the date that they actually received the injection    Have the screening questions been asked prior to this administration? Yes          Patient was educated on safety of Prolia utilizing Patient Counseling Chart for Healthcare Providers, as outlined by the Prolia REMS progam.

## 2021-06-15 NOTE — PROGRESS NOTES
"SUBJECTIVE: Stacey Allen is a 72 y.o. female with:  Chief Complaint   Patient presents with     Follow-up     osterpo    1) Osteoporosis - She had recent dexa scan.  It showed osteoporosis in the lumbar spine and osteopenia in the hips.  There was a question of whether there was a thoracic compression fracture.  She denies any injury.  No vertebral spine pain.  She has lost height in the last few years.  She takes vitamin D on a regular basis and has a good level.    2) Low back pain with sciatica-  She started to have back pain with radiation to leg 3 months ago.  In past she was able to recover but she has been going to PT for several weeks.  She has been to 6 sessions but has not improved.  Her left leg is numb with tingling all the time. She also has pain in the leg that is related to activity.  Pain goes down to back of knee and tingling goes to the toes in the left leg.  Sometimes she has similar pain in the right leg.  She has slight low back pain.  She is able to get through the day taking Aleve ( taking up to 6 pills / day).  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  Pain better but still flares with any activity.  Left leg feels weaker but does not give out.  Needs to use her right leg to get over a gate in her home.  No change in bowel or bladder function.  No previous MRI of spine in past.    SH: Single. Works as .    OBJECTIVE: /68 (Patient Site: Right Arm, Patient Position: Sitting)  Ht 5' 5\" (1.651 m)  Wt 205 lb (93 kg)  LMP 07/08/1992  BMI 34.11 kg/m2 no distress  Back: No vertebral tenderness.  Tender over left sciatic notch.  Her straight leg-raise test is negative bilaterally.  lower extremities: DTRs symmetric.  Motor 5/5 equal bilaterally.    Stacey was seen today for follow-up.    Diagnoses and all orders for this visit:    Osteoporosis- We reviewed her dexa scan and I explained the results " to her.  Will get thoracic xray to evaluate for compression fracture.  We discussed the risks and benefits of bisphosphonate therapy.  I recommend she consider Fosamax.  She does have some reflux so she will have to take the med carefully. We also discussed adequate vitamin D/ vitamin K/ calcium / magnesium.    -     XR Thoracic Spine 2 VWS; Future    Left sided sciatica - No improvement with PT so will refer to spine care clinic.  Consider MRI scan.  Check xray today.  -     XR Lumbar Spine 2 or 3 VWS; Future  -     Ambulatory referral to Spine Care    25 minutes spent with the patient.  Over 50% were spent in counseling and coordination of care.       Maria D Verde

## 2021-06-16 PROBLEM — M19.90 OSTEOARTHRITIS: Status: ACTIVE | Noted: 2019-02-28

## 2021-06-16 PROBLEM — M54.50 LOW BACK PAIN: Status: ACTIVE | Noted: 2019-08-23

## 2021-06-16 PROBLEM — I10 ESSENTIAL HYPERTENSION: Status: ACTIVE | Noted: 2018-07-24

## 2021-06-16 PROBLEM — E66.01 MORBID OBESITY (H): Status: ACTIVE | Noted: 2020-10-20

## 2021-06-16 PROBLEM — M81.0 OSTEOPOROSIS: Status: ACTIVE | Noted: 2017-11-21

## 2021-06-16 PROBLEM — Z80.0 FAMILY HISTORY OF MALIGNANT NEOPLASM OF COLON: Status: ACTIVE | Noted: 2018-07-17

## 2021-06-16 PROBLEM — M79.672 PAIN IN BOTH FEET: Status: ACTIVE | Noted: 2020-07-14

## 2021-06-16 PROBLEM — M48.00 SPINAL STENOSIS: Status: ACTIVE | Noted: 2019-08-23

## 2021-06-16 PROBLEM — N18.30 CHRONIC KIDNEY DISEASE, STAGE 3 (H): Status: ACTIVE | Noted: 2020-10-20

## 2021-06-16 PROBLEM — M79.671 PAIN IN BOTH FEET: Status: ACTIVE | Noted: 2020-07-14

## 2021-06-16 PROBLEM — Z78.9 HAS IMMUNITY TO COVID-19 VIRUS: Status: ACTIVE | Noted: 2021-05-03

## 2021-06-16 NOTE — PATIENT INSTRUCTIONS - HE
"    Dear Stacey Allen    After reviewing your responses, I've been able to diagnose you with \"Bronchitis\" which is a common infection of your lungs. While this is most commonly caused by a virus, the symptoms you have given suggest you should be treated with antibiotics.     I have sent medications that we discussed to your pharmacy to your pharmacy to treat this infection.     It is important that you take all of your prescribed medication even if your symptoms are improving after a few doses. Taking all of your medicine helps prevent the symptoms from returning.     If your symptoms worsen, you develop chest pain or shortness of breath, fevers over 101, or are not improving in 5 days, please contact your primary care provider for an appointment or visit any of our convenient Walk-in Care or Urgent Care Centers to be seen which can be found on our website here.    Thanks again for choosing us as your health care partner,    Ronnie Overton    "

## 2021-06-16 NOTE — PROGRESS NOTES
"  No problem-specific Assessment & Plan notes found for this encounter.      Stacey Allen is a 75 y.o. female who is being evaluated via a billable telephone visit.      The patient has been notified of following:     \"This telephone visit will be conducted via a call between you and your physician/provider. We have found that certain health care needs can be provided without the need for a physical exam.  This service lets us provide the care you need with a short phone conversation.  If a prescription is necessary we can send it directly to your pharmacy.  If lab work is needed we can place an order for that and you can then stop by our lab to have the test done at a later time.    If during the course of the call the physician/provider feels a telephone visit is not appropriate, you will not be charged for this service.\"         Stacey Allen complains of    Chief Complaint   Patient presents with     Cough     Entered automatically based on patient selection in [employer].     Cough ongoing 2 to 3 weeks  Clear drainage  Full errors  Advil sinus Mucinex no help  No facial pain  No fever  But does feel some head discomfort      Cough is bothersome  No choking  Has not woken her up    I have reviewed and updated the patient's Past Medical History, Social History, Family History and Medication List.    ALLERGIES  Patient has no known allergies.    Additional provider notes: See note    Assessment/Plan:  1. Bronchitis    - methylPREDNISolone (MEDROL DOSEPACK) 4 mg tablet; follow package directions  Dispense: 21 tablet; Refill: 0  - amoxicillin (AMOXIL) 500 MG tablet; Take 2 tablets (1,000 mg total) by mouth 2 (two) times a day for 10 days. May substitute capsules  Dispense: 40 tablet; Refill: 0    2. Subacute otitis media, unspecified otitis media type    - methylPREDNISolone (MEDROL DOSEPACK) 4 mg tablet; follow package directions  Dispense: 21 tablet; Refill: 0  - amoxicillin (AMOXIL) 500 MG tablet; Take 2 tablets " (1,000 mg total) by mouth 2 (two) times a day for 10 days. May substitute capsules  Dispense: 40 tablet; Refill: 0        Phone call duration:  18  Minutes    Risk benefits of therapy discussed    Follow-up if not improving in 1 week      Ronnie Overton MD

## 2021-06-16 NOTE — PROGRESS NOTES
Patient presents at the request of Xochilt Blandon for lower extremity EMG.  She has left-sided low back pain with left lower extremity pain.  She has numbness and tingling in both feet and weakness potentially the left lower extremity.    EMG/NCS  results: Please see scanned full report.    Comment NCS: Abnormal study  1.  Borderline/mildly low amplitudes of the bilateral lower extremity SNAPs.  2.  Low amplitude bilateral lower extremity tibial CMAPs  3.  Symmetric tibial H reflex bilaterally.  4.  Normal left peroneal CMAP.    Comment EMG: Normal study  1.  Normal needle EMG bilateral lower extremities.    Interpretation: Borderline study    1.  Mildly low amplitude bilateral SNAPs and tibial CMAPs to the AH muscle.  These findings may be normal for the patient or may indicate a mild sensorimotor polyneuropathy, predominantly axonal.      2. There is no electrodiagnostic evidence of lumbosacral radiculopathy, lumbosacral plexopathy, or focal neuropathy in the bilateral lower remedies.    The testing was completed in its entirety by the physician.      It was our pleasure caring for your patient today, if there any questions or concerns please do not hesitate to contact us.

## 2021-06-17 NOTE — TELEPHONE ENCOUNTER
RN cannot approve Refill Request    RN can NOT refill this medication med is not covered by policy/route to provider. Last office visit: 4/30/2021 Ronnie Overton MD Last Physical: 7/14/2020 Last MTM visit: Visit date not found Last visit same specialty: 4/30/2021 Ronnie Overton MD.  Next visit within 3 mo: Visit date not found  Next physical within 3 mo: Visit date not found      Erika Guillen, [facility] Triage/Med Refill 5/22/2021    Requested Prescriptions   Pending Prescriptions Disp Refills     budesonide-formoteroL (SYMBICORT) 160-4.5 mcg/actuation inhaler [Pharmacy Med Name: BUDESONIDE-FORMOTEROL 160-4.5] 10.2 Inhaler 1     Sig: TAKE 2 PUFFS BY MOUTH TWICE A DAY       There is no refill protocol information for this order

## 2021-06-17 NOTE — PROGRESS NOTES
ASSESSMENT & PLAN    No problem-specific Assessment & Plan notes found for this encounter.      Stacey was seen today for cough.    Diagnoses and all orders for this visit:    Exposure to COVID-19 virus  -     COVID-19 Yo RBD Heather and Reflex Titer    Morbid obesity (H)    Stage 3b chronic kidney disease  -     Basic Metabolic Panel    Wheezing  -     XR Chest 2 Views; Future  -     budesonide-formoteroL (SYMBICORT) 160-4.5 mcg/actuation inhaler; Inhale 2 puffs 2 (two) times a day.    Essential hypertension  -     valsartan (DIOVAN) 160 MG tablet; Take 1 tablet (160 mg total) by mouth daily.        Patient Instructions   Lower respiratory symptoms today    Likely this is ongoing bronchial irritation and wheezing  Could be related to allergies  Start Symbicort 2 puffs twice daily  Continue Ricola cough drops    Lisinopril as an ACE inhibitor can cause a cough  Why now I am not able to exactly tell you  Lets switch to valsartan 160 mg daily  Ensure that your blood pressure is at goal less than 140 on the top number less than 85 and the bottom number    I am doing a chest x-ray today  I recommend checking your kidney function  I am also checking Covid antibodies      Your oxygen saturations are within normal limits which is reassuring  I do not hear any evidence of crackles or extra fluid in the lungs which suggest this is not your heart    MyChart message me in 2 weeks to let me know how you are doing    DanL      Return in about 2 weeks (around 5/14/2021) for if symptoms do not resolve despite plan of care.       Little interest or pleasure in doing things: Not at all  Feeling down, depressed, or hopeless: Not at all    CHIEF COMPLAINT: Stacey Allen had concerns including Cough.    [place]: 1.............. SUBJECTIVE:  Stacey Allen is a 75 y.o. female had concerns including Cough.    1. Exposure to COVID-19 virus    2. Morbid obesity (H)    3. Stage 3b chronic kidney disease    4. Wheezing    5. Essential  "hypertension          No Known Allergies                      SOCIAL: She  reports that she quit smoking about 40 years ago. She has never used smokeless tobacco. She reports current alcohol use. She reports that she does not use drugs.    REVIEW OF SYSTEMS:   Family history not pertinent to chief complaint or presenting problem    Review of systems otherwise negative as requested from patient, except   Those positive ROS outlined and discussed in [place].      VITALS:  Vitals:    04/30/21 1100   BP: 144/80   Pulse: 82   Temp: 97.6  F (36.4  C)   TempSrc: Oral   SpO2: 98%   Weight: 214 lb (97.1 kg)   Height: 5' 5\" (1.651 m)     Wt Readings from Last 3 Encounters:   04/30/21 214 lb (97.1 kg)   10/20/20 213 lb (96.6 kg)   07/31/20 211 lb (95.7 kg)     Body mass index is 35.61 kg/m .    Physical Exam:  Full range of affect  Oropharynx is clear  TMs are clear  Nasal mucosa noncongested  Lungs diminished but clear  Cardiac regular rate and rhythm I do not appreciate any gallop she does have a soft systolic murmur right upper sternal border  Lower extremities without edema  Excellent distal pulses         I spent 20  minutes with this patient.  This includes pre-visit, intra-visit and post visit work an evaluation with regards to Stacey was seen today for cough.    Diagnoses and all orders for this visit:    Exposure to COVID-19 virus  -     COVID-19 Yo RBD Heather and Reflex Titer    Morbid obesity (H)    Stage 3b chronic kidney disease  -     Basic Metabolic Panel    Wheezing  -     XR Chest 2 Views; Future  -     budesonide-formoteroL (SYMBICORT) 160-4.5 mcg/actuation inhaler; Inhale 2 puffs 2 (two) times a day.    Essential hypertension  -     valsartan (DIOVAN) 160 MG tablet; Take 1 tablet (160 mg total) by mouth daily.        Ronnie Overton MD  Family Medicine   [facility] [id number] [phone]    "

## 2021-06-17 NOTE — PROGRESS NOTES
Assessment:     Diagnoses and all orders for this visit:    Spondylolisthesis, lumbar region    Left lumbar radiculopathy    Lumbar degenerative disc disease     Stacey Allen is a 72 y.o. y.o. female with past medical history significant for hypercholesterolemia, chronic reflux esophagitis, epistaxis, elevated liver enzymes, osteoporosis, large intestine benign polyp who presents today for follow-up regarding:    -2 weeks post left L5-S1 TF RY in which she received 85% relief of her leg pain as well as numbness and tingling, does have some minor tingling that remains however she is neurologically intact on exam today with no weakness noted which is encouraging.     Plan:     A shared decision making plan was used. The patient's values and choices were respected. Prior medical records from 2/7/18 were reviewed today. The following represents what was discussed and decided upon by the provider and the patient.        -DIAGNOSTIC TESTS: Images were personally reviewed and interpreted.   --Lower extremity EMG on the left reveals mild sensory motor polyneuropathy without any radiculopathy noted.  --Flexion-extension x-ray reveals no instability.  --Lumbar spine MRI 1/9/2018 does reveal L4-5-6 millimeters spondylolisthesis with discogenic marrow edema, advanced facet arthropathy with severe central canal and right greater than left lateral recess stenosis.  Moderate right and mild left foraminal stenosis.  L5-S1 moderate to advanced disc height loss with no central stenosis, mild bilateral foraminal stenosis not noted on radiology report however).  --Lumbar spine x-ray 12/27/2017 reveals 12 mm anterolisthesis L4-5 and 6 mm L5-S1.  Moderate to marked disc height loss at L4-5 and marked at L5-S1 with moderate to marked lumbar facet arthropathy.  --Thoracic spine x-ray reveals exaggeration of thoracic kyphosis without compression fracture.  Moderate midthoracic degenerative disc disease.    -INTERVENTIONS: Consider  repeating injections down the road if symptoms worsen however she is doing well at this time.    -MEDICATIONS: Did discuss weaning down on gabapentin she is currently taking 2 minute tablets in the morning and 2 at nighttime recommended decreasing 1 tablet every week and she can wean off of it completely if she is doing well without return of her symptoms.  Discussed side effects of medications and proper use. Patient verbalized understanding.    -PHYSICAL THERAPY: Advised patient continue home exercises from prior physical therapy sessions as she is doing well.  Could consider [facility] program down the road if symptoms return.  Discussed the importance of core strengthening, ROM, stretching exercises with the patient and how each of these entities is important in decreasing pain.  Explained to the patient that the purpose of physical therapy is to teach the patient a home exercise program.  These exercises need to be performed every day in order to decrease pain and prevent future occurrences of pain.        -PATIENT EDUCATION:  20 minutes of total visit time was spent face to face with the patient today, 60 % of the visit was spent on counseling, education, and coordinating care.   -5 minutes spent outside of visit time, non-face-to-face time, reviewing chart.    -FOLLOW UP: Follow-up as needed if symptoms worsen or new symptoms arise.  Advised to contact clinic if symptoms worsen or change.    Subjective:     Stacey Allen is a 72 y.o. female who presents today for follow-up regarding 2 weeks post left L5-S1 TF RY in which she received 80% relief of her left leg pain and is doing really well in regards to symptoms in general at this time.  Currently her pain is a 2/10, a 0 at its best but gets up to a 6 at its worse with standing or walking for long periods of time.  Typically is more tolerable however and she only has numbness noted into the left lateral foot at this point.  She denies any new symptoms denies any  weakness or dragging of her foot.  She does feel that the strength has improved significantly in her left lower extremity.    Denies any bowel or bladder dysfunction, denies any recent trips or falls however.     -Treatment to Date: No prior spinal surgery or spinal injection.  Physical therapy ×5 sessions with no benefit.  Traction aggravated her pain.     Bilateral L4-5 TF RY 1/22/2018 with 80% relief of bilateral low back and radicular pain.  Still residual LEFT radiculitis noted.  Left L5-S1 TF RY 4/2/2018 with 85% relief of left leg pain.      -Medications:  Over-the-counter Aleve, 2 tablets in the morning and occasionally 2 in the afternoon with some relief.  Gabapentin 100 mg 2-0-2 with some dizzines loopiness s/ side effec, however tolerable.    Patient Active Problem List   Diagnosis     Hypercholesterolemia     Decrease In Height     Chronic Reflux Esophagitis     Epistaxis     Benign Polyps Of The Large Intestine     Bartholin Gland Cyst     Elevated liver enzymes     Osteoporosis       Current Outpatient Prescriptions on File Prior to Encounter   Medication Sig Dispense Refill     gabapentin (NEURONTIN) 100 MG capsule Take 300 mg by mouth 3 (three) times a day. Follow Gabapentin Dosing chart given 270 capsule 3     MULTIVIT-MINERALS/FERROUS FUM (MULTI VITAMIN ORAL) Take 1 tablet by mouth daily.       pantoprazole (PROTONIX) 40 MG tablet Take 40 mg by mouth daily.       No current facility-administered medications on file prior to encounter.        No Known Allergies    Past Medical History:   Diagnosis Date     Hyperlipidemia      Hypertension      Osteoporosis      Stomach ulcer         Review of Systems  ROS: Positive for numbness and tingling, headache, dizziness.  Specifically negative for bowel/bladder dysfunction, balance changes, foot drop, fevers, chills, appetite changes, nausea/vomiting, unexplained weight loss. Otherwise 13 systems reviewed are negative. Please see the patient's intake  questionnaire from today for details.    Reviewed Social, Family, Past Medical and Past Surgical history with patient, no significant changes noted since prior visit.     Objective:     BP (!) 196/82 (Patient Site: Left Arm, Patient Position: Sitting)  Pulse 97  LMP 07/08/1992  SpO2 94%    PHYSICAL EXAMINATION:    --CONSTITUTIONAL: Well developed, well nourished, healthy appearing individual.  --PSYCHIATRIC: Appropriate mood and affect. No difficulty interacting due to temper, social withdrawal, or memory issues.  --SKIN: Lumbar region is dry and intact.   --RESPIRATORY: Normal rhythm and effort. No abnormal accessory muscle breathing patterns noted.   --MUSCULOSKELETAL:  Normal lumbar lordosis noted, no lateral shift.  --GROSS MOTOR: Easily arises from a seated position. Gait is non-antalgic  --LUMBAR SPINE:  Inspection reveals no evidence of deformity.   --LOWER EXTREMITY MOTOR TESTING:  Plantar flexion left 5/5, right 5/5   Dorsiflexion left 5/5, right 5/5   Great toe MTP extension left 5/5, right 5/5  Knee flexion left 5/5, right 5/5  Knee extension left 5/5, right 5/5   Hip flexion left 5/5, right 5/5  Hip abduction left 5/5, right 5/5  Hip adduction left 5/5, right 5/5   --NEUROLOGIC: Bilateral patellar and achilles reflexes are physiologic and symmetric. Sensation to light touch is intact in the bilateral L4, L5, and S1 dermatomes.    RESULTS:   Imaging: Lumbar spine imaging was reviewed today. The images were shown to the patient and the findings were explained using a spine model.      XR LUMBAR SPINE FLEX AND EXT 2 OR 3 VWS  2/19/2018   INDICATION: L4-L5 spondylolisthesis. Evaluate stability.  COMPARISON: MRI lumbar spine 01/09/2018.  FINDINGS: Last fully developed interspace is designated L5-S1 as on prior MRI. With this numbering system there is approximately 15 mm anterolisthesis of L4 with respect to L5 which is very similar in both flexion and extension. Severe interspace   narrowing is seen at  L4-L5 and L5-S1 with associated endplate sclerosis. More mild posterior interspace narrowing at L3-L4. Vertebral body heights are preserved.         Mr Lumbar Spine Without Contrast  Result Date: 1/9/2018  INDICATION: Lower back pain. Bilateral L5-S1 radiculitis, left worse than right. Spondylolisthesis. TECHNIQUE: Unenhanced. COMPARISON: Plain films 12/27/2017.   FINDINGS: Five lumbar vertebrae are assumed. Mild convex left lumbar curvature.   T11-T12: Disc dehydration. Slight disc space loss. Mild interbody spurring. Small shallow central disc protrusion. Facet joints negative. No significant central canal or foraminal stenosis.   T12-L1: Disc dehydration. Mild disc space loss. Small to moderate-sized right paracentral disc protrusion mildly effaces the ventral thecal sac on the right. Facet joints negative. No significant central canal stenosis. No foraminal stenosis.   L1-L2: Disc dehydration. Mild disc space loss. Annular bulge and small shallow central to right paracentral disc protrusion. Facet joints negative. No central canal or foraminal stenosis.   L2-L3: Normal disc signal and height. Facet joints negative. No significant central canal or foraminal stenosis.   L3-L4: Disc dehydration. Mild disc space loss. Mild annular bulge. Mild degenerative facet arthropathy. Ligamentum flavum thickening. No central canal stenosis. No foraminal stenosis.   L4-L5: 6 mm of anterolisthesis of L4 on L5. Disc dehydration. Moderate disc space loss. Mild discogenic marrow edema. Mild annular bulge. Advanced degenerative facet arthropathy. Ligamentum flavum thickening. Severe central canal stenosis and right worse  than left lateral recess stenosis. Moderate right and mild left foraminal stenosis.   L5-S1: Disc dehydration. Moderate to advanced disc space loss. Mild interbody spurring. Annular bulge. Mild degenerative facet arthropathy. No significant central canal stenosis. No foraminal stenosis.   Conus and intraspinal  contents otherwise negative.   No significant marrow signal abnormality.   No significant paravertebral soft tissue abnormality. Colonic diverticulosis. Hiatal hernia.   CONCLUSION:   1.  Degenerative changes lumbar spine as described above.   2.  Low-grade degenerative spondylolisthesis L4 on L5.   3.  At L4-L5, there is severe central canal stenosis, right worse than left lateral recess stenosis, and moderate right and mild left foraminal stenosis due to the degenerative changes and spondylolisthesis.        Xr Thoracic Spine 2 Vws  Result Date: 12/28/2017  INDICATION: Age-related osteoporosis without current pathological fracture. COMPARISON: None. FINDINGS: Mild right convexity mid thoracic curvature and exaggeration of the normal thoracic kyphosis. No definite acute compression fracture deformity though the evaluation of the upper thoracic levels is limited by overlying soft tissue and shoulder artifact. Moderate midthoracic degenerative disc disease. Visualized lungs are clear.           Xr Lumbar Spine 2 Or 3 Vws  Result Date: 12/28/2017  INDICATION: Sciatica, left side. COMPARISON: None. FINDINGS: Nomenclature based on 5 lumbar-type vertebral bodies. 12 mm anterolisthesis of L4 on L5 and 6 mm anterolisthesis L5 on S1. Alignment is otherwise normal. Vertebral body heights are maintained. Moderate to marked loss of disc space height at L4-L5 and marked changes at L5-S1. Moderate to marked lower lumbar facet arthropathy. Mild degenerative change about the hip and sacroiliac joints.

## 2021-06-17 NOTE — PATIENT INSTRUCTIONS - HE
Patient Instructions by Sol Brennan PT at 1/14/2019  3:00 PM     Author: Sol Brennan PT Service: -- Author Type: Physical Therapist    Filed: 1/14/2019  3:26 PM Encounter Date: 1/14/2019 Status: Signed    : Sol Brennan PT (Physical Therapist)        ABDOMINAL BRACING    While lying on your back, tighten your stomach muscles as you draw your navel down towards the floor.    ABDOMINAL BRACING TRAINING    This can be performed standing or lying down.     Press your finger tips into your relaxed abdomen lateral of your navel. Next, tighten and brace your abdomen so that the muscles push your finger tips away from the center of your body. Hold and then relax and repeat.       BRACE - SINGLE KNEE EXTENSION    While lying on your back with knees bent, straighten out one knee while keeping the leg off the ground. Hold as indicated, then return to original position. Next, perform on the other leg.    Use your stomach muscles to keep your spine from moving the entire time.  Repeat 10x, relax, then repeat 10x again.  FOCUS on control

## 2021-06-17 NOTE — PATIENT INSTRUCTIONS - HE
Lower respiratory symptoms today    Likely this is ongoing bronchial irritation and wheezing  Could be related to allergies  Start Symbicort 2 puffs twice daily  Continue Ricola cough drops    Lisinopril as an ACE inhibitor can cause a cough  Why now I am not able to exactly tell you  Lets switch to valsartan 160 mg daily  Ensure that your blood pressure is at goal less than 140 on the top number less than 85 and the bottom number    I am doing a chest x-ray today  I recommend checking your kidney function  I am also checking Covid antibodies      Your oxygen saturations are within normal limits which is reassuring  I do not hear any evidence of crackles or extra fluid in the lungs which suggest this is not your heart    MyChart message me in 2 weeks to let me know how you are doing    DanL

## 2021-06-18 NOTE — PATIENT INSTRUCTIONS - HE
"Patient Instructions by Sara Roberts LPN at 3/11/2020  8:20 AM     Author: Sara Roberts LPN Service: -- Author Type: Licensed Nurse    Filed: 3/11/2020  9:28 AM Encounter Date: 3/11/2020 Status: Addendum    : Sara Roberts LPN (Licensed Nurse)    Related Notes: Original Note by Sara Roberts LPN (Licensed Nurse) filed at 3/11/2020  8:49 AM       We are prescribing some compression stockings for you. I have included different suppliers that should help you get measured and fitting to ensure proper fitting socks. You should wear this socks as much as you can. It is especially important to wear them with long periods of sitting/standing, long car rides or if you will be flying. Compression socks should get refilled every 4-6 months. They do not need to be worn at night while in bed.    If you do a lot of standing it is good to do calf raises to help keep the blood pumping. If you sit a lot at work it is good to get up periodically to walk around. Elevation of the foot of your bed 4-6\" helps the blood return back to where it is needed.      Varicose Veins      Varicose veins are swollen, enlarged veins most often found in the legs. They are usually blue or purple in color and may bulge, twist, and stand out under the skin.  Normally, veins return blood from the body to the heart. The leg veins have one-way valves that prevent blood from flowing backward in the vein. When the valves are weak or damaged, blood backs up in the veins. This may cause some of the veins to swell and bulge and become varicose veins.  Symptoms  Varicose veins may or may not cause symptoms. If symptoms do occur, they can include:    Legs that feel tired, achy, heavy, or itchy    Leg muscle cramps    Skin changes, such as discoloration, dryness, redness, or rash (in more severe cases, you may also have sores on the skin called venous leg ulcers)  Risk Factors  There are a number of factors that increase the risk for " varicose veins. These can include:    Being a woman    Being older    Sitting or standing for long periods    Being overweight    Being pregnant    Having a family history of varicose veins  Treatment begins with simple self-help measures (see below). If these dont help, there are many procedures that can be done to shrink or remove varicose veins. Your healthcare provider can tell you more about these options, if needed.  Home care    Support or compression stockings will likely be prescribed. If so, be sure to wear them as directed. They may help improve blood flow.    Exercising helps strengthen your leg muscles and improve blood flow. To get the most benefit, choose exercises such as walking, swimming, or cycling. Also try to exercise for at least 30 minutes on most days.    Raising (elevating) your legs lets gravity help blood flow back to the heart. Sit or lie with your feet above heart level a few times throughout the day, or as directed.    Avoid long periods of sitting or standing. Change positions often. Also, move your ankles, toes and knees often. This may also help improve blood flow.    If you are overweight, talk with your healthcare provider about setting up a weight-loss plan. Maintaining a healthy weight can help reduce the strain on your veins. It may also improve symptoms, such as swelling and aching.    If you have dryness and itching, ask your provider about special lotions that can be applied to the skin to help improve symptoms.  Follow-up care  Follow up with your healthcare provider, or as directed. If imaging tests were done, youll be told the results and if there are any new findings that affect your care.  When to seek medical advice  Call your healthcare provider right away if any of these occur:    Sudden, severe leg swelling, pain, or redness    Symptoms worsen, or they dont improve with self-care    Bleeding from any affected veins    Ulcers form on the legs, ankles, or feet    Fever  of 100.4 F (38 C) or higher, or as advised by your provider      Understanding Spider and Varicose Veins  Do you often hide your legs because of the way they look? You may have noticed tiny red or blue bursts (spider veins). Or maybe you have veins that bulge or look twisted (varicose veins). If so, there are treatments that can help  What are the symptoms?  Spider veins or varicose veins may never be a problem. But sometimes they can cause legs to ache or swell. Your legs may also feel heavy and tired, or like theyre burning. These symptoms may be more severe at the end of the day. Prolonged sitting or standing can also make your symptoms worse.  Who gets spider and varicose veins?  Anyone can get spider or varicose veins. But vein problems tend to be hereditary (run in families). Other factors that can affect veins include:    Pregnancy, hormones, and birth control pills    A job where you stand or sit a lot    Extra weight or lack of exercise    Age         Spider veins look like tiny webs on the ankles, legs, and upper thighs.       Ropy, dark blue, red, or flesh-colored varicose veins are most common on the thighs, calves, and feet.    What can be done?  Spider and varicose veins can affect the way you feel about yourself. Talk to your healthcare provider about your concerns. There are treatments that can ease symptoms and make your legs look better.  Your treatment choices  Treatment may include self-care, sclerotherapy (injecting veins with a chemical), surgery, or newer nonsurgical minimally invasive therapies. Spider veins and some varicose veins can be treated with sclerotherapy. Large varicose veins can often be treated with newer minimally invasive procedures and, in rare cases, surgery may be needed.     Please call [facility] to schedule an appointment. If you received a prescription please bring it with you to your appointment. You may call one of the locations below, although  some locations are limited to what they carry.    Office Locations  New Locations  [facility]  Home Medical Equipment  [address] [facility] [address], [place] [address]  Orthotics and Prosthetics ([facility])  [address], [place] [address]  Phone [phone] /Fax [phone]        [facility]   [address]   Medical [address]/Orthotics and Prosthetics [address]  [place] [address]   Phone: [phone]  Fax [phone]    [facility]  [address]  [place] [address]  Phone: [phone]  Fax: [phone]    [facility]   [address]  [place] [address]  Phone: [phone]  Fax: [phone]    [facility]  [address]  [place] [address]  Phone: [phone]  Fax: [phone]    [facility]  [address]  [place] [address]  Phone: [phone]  Fax: [phone]    [facility]  [address]   [place] [address]   Phone: [phone]  Fax: [phone]    [place]   [address] [place] [address]   Phone: [phone]  Fax: [phone]    [facility]  [address]  [place] [address]    [place] [phone]  [phone]  [place] [phone]  [email or web address]      [facility]   [address]             [address]                 [address]     [place] [address]      [place] [address]         [place], [place] [id number] [phone]  [phone]     [phone]               Fax: [phone]  [email or web address]                                                      [facility]  [address] [place] [address]  [phone]  [phone]  [email or web address]    [facility]  [phone]  [email or web address]   [phone]    [facility]  [address] [place] [address]  [phone]      Patient Education     Sclerotherapy    The most common way to treat spider and small varicose veins is sclerotherapy. This is a simple office procedure. After treatment, you can return to your daily activities right away. For best results, some veins may need to be treated more than once.  What sclerotherapy does  Sclerotherapy causes spider and small varicose veins to disappear over time. This is done by injecting the veins with a chemical that causes it to scar and collapse. Blood is then rerouted to flow only through the healthy veins. Over time, the treated veins go away. Sclerotherapy will not prevent new spider and varicose veins from forming   What this treatment involves  Your healthcare provider injects your affected veins using a fine needle. In most cases, this isnt painful. You may feel a slight burning or stinging. Many veins can be treated in a single session. But some veins may need to be injected more than once. After sclerotherapy, the injection sites are covered with tape and gauze. You may also have to wear bandages or elastic stockings for up to a week.  Recovering at home  At first, your legs will most likely be bruised. For a brief time, they may even look worse than they did before treatment. But dont worry. You can expect to see good results in 6 to 8 weeks. For the best outcome, here are some helpful hints:    Wear elastic stockings or bandages as directed.    Elevate your legs as instructed to help reduce swelling.    Walk each day to increase blood flow.    Follow your healthcare providers advice about other kinds of exercise.  When to call your healthcare  provider  Call your healthcare provider right away if you notice any of the following:    Pain in your legs or feet    Bleeding at injection sites    Extreme swelling or bruising    Fever    Chest pain    Skin breakdown at the site of the injection   Date Last Reviewed: 5/1/2016 2000-2019 [employer]. [address] [place] [address]. All rights reserved. This information is not intended as a substitute for professional medical care. Always follow your healthcare professional's instructions.         SCLEROTHERAPY  Dr. Yulia Reynolds  [phone]

## 2021-06-19 NOTE — PROGRESS NOTES
"SUBJECTIVE: Stacey Allen is a 73 y.o. female with:  Chief Complaint   Patient presents with     Hypertension     f/u    She had a colonoscopy last week and her BP was very high when checked by anesthesiology.  She was waiting for the procedure and her BP was 189/90.  She has had some slightly high BP readings in the past.  Does not check BP at home. No symptoms. Has never been on any meds for elevated BP. Her back pain is doing better and she joined a club and plans to start doing some water exercises.    OBJECTIVE: /82  Pulse 80  Ht 5' 5\" (1.651 m)  Wt 199 lb 4 oz (90.4 kg)  LMP 07/08/1992  SpO2 96%  BMI 33.16 kg/m2 no distress    BP Readings from Last 3 Encounters:   07/23/18 160/82   04/16/18 (!) 196/82   04/02/18 148/84     Stacey was seen today for hypertension.    Diagnoses and all orders for this visit:    Elevated BP without diagnosis of hypertension       Patient Instructions   Goal is less than 140 / less than 90    Call with blood pressure readings     I am going to have her check BP readings at home over the next 2 weeks and call with results.    If BPs are persistently elevated will start her on lisinopril.  I have her handout on nonpharmacologic ways to lower BP including the DASH diet.    15 minutes spent with the patient.  Over 50% were spent in counseling and coordination of care.      Maria D Verde   "

## 2021-06-19 NOTE — PROGRESS NOTES
[facility]  OFFICE VISIT - FAMILY MEDICINE     ASSESSMENT AND PLAN     1. HTN (hypertension)  Not at goal today since starting lisinopril 10mg. No medication side effects. No CP or shortness of breath. Will increase to 20mg daily. Due for CMP today. Patient will continue to monitor bp at home and will return for follow up in 1 month.     Comprehensive Metabolic Panel    lisinopril (PRINIVIL,ZESTRIL) 20 MG tablet       CHIEF COMPLAINT     Follow-up (f/u hypertension ) and Medication Refill (requesting refill for lisinopril )    HPI     Stacey Allen is a 73 y.o. female with past medical history as below who presents today for blood pressure follow up. Started on lisinopril 10mg for htn at end of July and has been taking medication as prescribed. Denies side effects. Reports blood pressure had been creeping up over time but had not been on medication in the past prior to this. Requesting refill of medication today as her dog ate her medication bottle. Due for labs today. No CP or shortness of breath. No fevers, chills, nausea, vomiting or diarrhea.       Review of Systems  12-point review of systems completed and as per HPI, otherwise negative.     PMSH     Past Medical History:   Diagnosis Date     Hyperlipidemia      Hypertension      Osteoporosis      Stomach ulcer      Past Surgical History:   Procedure Laterality Date     MS  DELIVERY ONLY      Description:  Section;  Recorded: 2011;     MS EXPLORATORY OF ABDOMEN      Description: Exploratory Laparotomy;  Recorded: 2011;     MS REMOVE TONSILS/ADENOIDS,<13 Y/O      Description: Tonsillectomy With Adenoidectomy;  Recorded: 2011;       Formerly [facility]     Family History   Problem Relation Age of Onset     Colon cancer Mother 74     Diabetes Mother      Cancer Sister 62     Lung     Aneurysm Father      Social History     Social History     Marital status: Single     Spouse name: N/A     Number of children: N/A     Years of  education: N/A     Occupational History     Not on file.     Social History Main Topics     Smoking status: Former Smoker     Quit date: 8/1/1980     Smokeless tobacco: Never Used     Alcohol use Yes     Drug use: No     Sexual activity: Not on file     Other Topics Concern     Not on file     Social History Narrative     Relevant history was reviewed with the patient today, unless noted in HPI, is not pertinent for this visit.    MEDICATIONS     Current Outpatient Prescriptions on File Prior to Visit   Medication Sig Dispense Refill     gabapentin (NEURONTIN) 100 MG capsule Take 300 mg by mouth 3 (three) times a day. Follow Gabapentin Dosing chart given 270 capsule 3     lisinopril (PRINIVIL,ZESTRIL) 10 MG tablet Take 1 tablet (10 mg total) by mouth daily. 30 tablet 0     MULTIVIT-MINERALS/FERROUS FUM (MULTI VITAMIN ORAL) Take 1 tablet by mouth daily.       pantoprazole (PROTONIX) 40 MG tablet Take 40 mg by mouth daily.       No current facility-administered medications on file prior to visit.        OBJECTIVE   /86 (Patient Site: Left Arm, Patient Position: Sitting, Cuff Size: Adult Regular)  Pulse 72  Resp 16  Wt 202 lb (91.6 kg)  LMP 07/08/1992  SpO2 98%  Breastfeeding? No  BMI 33.61 kg/m2    Repeat BP today: 132/84    Physical Exam  Physical Examination: General appearance - alert, well appearing, and in no distress  Mental status - alert, oriented to person, place, and time  Chest - clear to auscultation, no wheezes, rales or rhonchi, symmetric air entry  Heart - normal rate, regular rhythm, normal S1, S2, no murmurs, rubs, clicks or gallops  Neurological - alert, oriented, normal speech, no focal findings or movement disorder noted  Musculoskeletal - no joint tenderness, deformity or swelling  Extremities - peripheral pulses normal, no pedal edema, no clubbing or cyanosis  Skin - normal coloration and turgor, no rashes, no suspicious skin lesions noted      RESULTS/CONSULTS (Lab/Radiology)   No  results found for this or any previous visit (from the past 168 hour(s)).    HEALTH MAINTENANCE / SCREENING     PHQ-2 Total Score: 1 (10/23/2017 12:49 PM), No Data Recorded,No Data Recorded    Health Maintenance   Topic Date Due     FALL RISK ASSESSMENT  05/27/2012     INFLUENZA VACCINE RULE BASED (1) 08/01/2018     MAMMOGRAM  11/06/2019     DXA SCAN  11/07/2019     TD 18+ HE  09/09/2021     ADVANCE DIRECTIVES DISCUSSED WITH PATIENT  10/23/2022     COLONOSCOPY  07/17/2023     PNEUMOCOCCAL POLYSACCHARIDE VACCINE AGE 65 AND OVER  Completed     PNEUMOCOCCAL CONJUGATE VACCINE FOR ADULTS (PCV13 OR PREVNAR)  Completed     ZOSTER VACCINE  Completed       Saritha Goldsmith, CNP  Family Medicine, [facility]

## 2021-06-20 NOTE — PROGRESS NOTES
SUBJECTIVE: Stacey Allen is a 73 y.o. female with:  Chief Complaint   Patient presents with     blood pressure     been higher      Cough     x2 weeks     Immunizations     flu    1) HTN - She has HTN and was started on lisinopril but it did not control her BP so HCTZ added.  She has been monitoring her BP with her own machine and running high at home. BPs at home as high as 186/107.   Lowest 135/81.  No symptoms.  No problems with the medication.  She has been having some leg cramps.    2) She started with URI a couple of weeks ago. Had rhinorrhea/ congestion.  Now she has persistent cough with some clear sputum.  No shortness of breath / wheezing or fevers.    3) She would like to get flu shot today.    OBJECTIVE: /90 (Patient Site: Right Arm, Patient Position: Sitting, Cuff Size: Adult Regular)  Pulse 83  Resp 16  Wt 198 lb (89.8 kg)  LMP 07/08/1992  SpO2 97%  BMI 32.95 kg/m2   No acute distress.  HEENT: Head is atraumatic and/normocephalic.  PERRL.  Conjunctiva clear.  Tympanic membranes grey with normal landmarks and normal light reflexes.  No nasal discharge.  Oropharynx is pink and moist.    Neck: Supple.  No lymphadenopathy or thyromegaly.  Lungs: Clear to auscultation.  No wheezing, retractions, or tachypnea.  Heart: RRR. S1 and S2 normal.  No murmurs, rubs, or gallops.  Neuro: Awake, alert, oriented x 3.  Normal strength and tone.  Normal gait.     Stacey was seen today for blood pressure, cough and immunizations.    Diagnoses and all orders for this visit:    Essential hypertension- BP still elevated.  Will add amlodipine.  Continue lisinopril/ HCTZ.  -     Basic Metabolic Panel  -     Magnesium  -     amLODIPine (NORVASC) 5 MG tablet; Take 1 tablet (5 mg total) by mouth daily.    Cough- post-viral.  Will rx cough medication so she can rest.  -     codeine-guaiFENesin (GUAIFENESIN AC)  mg/5 mL liquid; Take 5-10 mL by mouth every 6 (six) hours as needed for cough.    Other orders  -      Influenza High Dose, Seasonal 65+ yrs    Patient Instructions   Start on amlodipine 5 mg daily for blood pressure    Follow up in 2 weeks    Use cough syrup for cough but call if worse     Maria D Verde

## 2021-06-20 NOTE — LETTER
Letter by Maria D Verde MD at      Author: Maria D Verde MD Service: -- Author Type: --    Filed:  Encounter Date: 3/9/2020 Status: (Other)           March 9, 2020        Stacey ROSSANA Allen  [address] [facility] [address]        Dear Stacey,    Breast cancer is the most common type of cancer among American women. The earlier breast cancer is detected, the better the survival rate. Your best defense against breast cancer is having a screening mammogram on a regular basis. The American Cancer Society recommends that women with an average risk of breast cancer should undergo regular screening mammography starting at age 45 years.         Women aged 45 to 54 years should have a mammogram annually.     Women 55 years and older should have a mammogram at least every other year.     There may be important reasons for you to follow a more frequent screening plan or an earlier start for breast cancer screening, so please discuss your health history and your family health history with your primary care provider.       We reviewed our records and we do not see that you had a mammogram within the past two years. If you have not had a mammogram in the past two years, we strongly encourage you to call for your appointment today. For your convenience, we have included a phone number below for you to schedule your mammogram at a nearby [facility] location.      If you have had a recent mammogram or have questions about why you are receiving this letter, please contact your primary care clinic at [phone] or send a [employer] message  ([email or web address]). Your clinic will answer any questions or help obtain recent mammogram reports for your chart at [facility] so we can keep record of your preventive care.       Sincerely,    Maria D Verde MD    and your primary care team at [facility]  Schedule your mammogram today at one of our   locations  Please call  [phone]

## 2021-06-21 NOTE — PROGRESS NOTES
"SUBJECTIVE: Stacey Allen is a 73 y.o. female with:  Chief Complaint   Patient presents with     Hypertension     med check        1) HTN- She added amlodipine 5 mg to her BP meds and BP has been better controlled. She denies any side effects from the amlodipine. Continues on lisinopril / HCTZ.  BP was good when she was at dentist recently.    2) Skin lesion - She has a red dry spot on her right upper forehead.  Has been present for awhile.  Occasional itchy. Has not tried any treatment for the lesion.    3) She had elevation of liver enzyme on last lab test.    OBJECTIVE: /62  Pulse 83  Ht 5' 5\" (1.651 m)  Wt 203 lb (92.1 kg)  LMP 07/08/1992  SpO2 98%  BMI 33.78 kg/m2 no distress  Skin: 7 mm red scaly macule right upper forehead.    Stacey was seen today for hypertension.    Diagnoses and all orders for this visit:    Essential hypertension  -     amLODIPine (NORVASC) 5 MG tablet; Take 1 tablet (5 mg total) by mouth daily.  -     lisinopril-hydrochlorothiazide (ZESTORETIC) 20-25 mg per tablet; Take 1 tablet by mouth daily. In the AM for high Blood Pressure  -     Basic Metabolic Panel    Elevated liver enzymes  -     Hepatic Profile    AK (actinic keratosis)     PROCEDURE NOTE:    The lesion on forehead was treated with liquid nitrogen applied with a spray gun applicator for a total of 3 freeze and thaw treatments.  Patient was advised to keep the area clean and dry.  Possible complications could include pain or blistering.  Follow up if not resolved.  "

## 2021-06-22 NOTE — PROGRESS NOTES
Assessment:     Diagnoses and all orders for this visit:    Chronic bilateral low back pain with bilateral sciatica  -     Ambulatory referral to PT/OT  -     OPS TFESI Lumbar Sacral Unilateral  -     gabapentin (NEURONTIN) 100 MG capsule  Dispense: 180 capsule; Refill: 3    Lumbar radiculitis  -     Ambulatory referral to PT/OT  -     OPS TFESI Lumbar Sacral Unilateral  -     gabapentin (NEURONTIN) 100 MG capsule  Dispense: 180 capsule; Refill: 3    Spondylolisthesis, lumbar region  -     Ambulatory referral to PT/OT    Lumbar degenerative disc disease  -     Ambulatory referral to PT/OT    Spinal stenosis of lumbar region without neurogenic claudication  -     Ambulatory referral to PT/OT       Stacey Allen is a 73 y.o. y.o. female with past medical history significant for hypercholesterolemia, chronic reflux esophagitis, epistaxis, elevated liver enzymes, osteoporosis, large intestine benign polyp who presents today for follow-up regarding:    -Progressive worsening bilateral low back pain with bilateral lumbar radiculitis in which previously she has gotten good relief with bilateral L4-5 TF RY as well as subsequent left L5-S1 TF RY.  Pain is constant however.  Patient does have significant spondylolisthesis at L4-5 with severe central and bilateral lateral recess stenosis.  L5-S1 shows mild bilateral foraminal stenosis.    Patient is neurologically intact on exam.     Plan:     A shared decision making plan was used. The patient's values and choices were respected. Prior medical records from 4/16/18 were reviewed today. The following represents what was discussed and decided upon by the provider and the patient.        -DIAGNOSTIC TESTS: Images were personally reviewed and interpreted.   --Lower extremity EMG on the left reveals mild sensory motor polyneuropathy without any radiculopathy noted.  --Flexion-extension x-ray reveals no instability.  --Lumbar spine MRI 1/9/2018 does reveal L4-5-6 millimeters  spondylolisthesis with discogenic marrow edema, advanced facet arthropathy with severe central canal and right greater than left lateral recess stenosis.  Moderate right and mild left foraminal stenosis.  L5-S1 moderate to advanced disc height loss with no central stenosis, mild bilateral foraminal stenosis not noted on radiology report however).  --Lumbar spine x-ray 12/27/2017 reveals 12 mm anterolisthesis L4-5 and 6 mm L5-S1.  Moderate to marked disc height loss at L4-5 and marked at L5-S1 with moderate to marked lumbar facet arthropathy.  --Thoracic spine x-ray reveals exaggeration of thoracic kyphosis without compression fracture.  Moderate midthoracic degenerative disc disease.    -INTERVENTIONS: Did order repeat bilateral L4-5 transforaminal epidural steroid injection to see if we get further relief of her bilateral lumbar radiculitis currently her pain is 60% on the left and 40% on the right.  Did discuss with patient again if she does not get complete resolution of her left leg pain we could then repeat a left L5-S1 TF RY 2 weeks post, she is in agreement with this plan as she did well with this plan previously January and April 2018.    -MEDICATIONS: Did re-prescribe gabapentin 100 mg to titrate up to 2 tablets 3 times daily as tolerated as well which she did well on previously but then titrated off as her pain was tolerable.  Did discuss with patient that she can continue this long-term if she would like.  Discussed side effects of medications and proper use. Patient verbalized understanding.    -PHYSICAL THERAPY: Referral to physical therapy [facility] at the [facility] for lumbar MedX program for intensive core strengthening which I feel she would benefit from given her spondylolisthesis L4-5 that is stable with her chronic low back pain.  She is in agreement with this plan would like to try this program.  Advised patient to continue with home exercises on a regular basis as well.  Discussed the importance  of core strengthening, ROM, stretching exercises with the patient and how each of these entities is important in decreasing pain.  Explained to the patient that the purpose of physical therapy is to teach the patient a home exercise program.  These exercises need to be performed every day in order to decrease pain and prevent future occurrences of pain.        -PATIENT EDUCATION:  25 minutes of total visit time was spent face to face with the patient today, 60 % of the visit was spent on counseling, education, and coordinating care.   -5 minutes spent outside of visit time, non-face-to-face time, reviewing chart.    -FOLLOW UP: Follow-up for injection then 2 weeks postinjection.  Advised to contact clinic if symptoms worsen or change.    Subjective:     Stacey Allen is a 73 y.o. female who presents today for follow-up regarding return of bilateral low back pain that is slightly worse on the left however only 60% on the left and 40% on the right that radiates to bilateral lower extremities worse into the posterior thighs and posterior calves as well as some into the anterior thighs and anterior calves with associated numbness and tingling that is been more bothersome for the last couple of months.  She did well with previous injections in January as well as a left injection in April but states her pain is returned.    Patient denies bowel or bladder loss of control.  Denies any recent trips or falls or injury.  She does report ongoing chronic balance changes however feels it is tolerable at this time.  She does feel weaker in bilateral lower extremities slightly worse on the left but again denies episodes of her legs giving out on her.    -Treatment to Date: No prior spinal surgery or spinal injection.  Physical therapy ×5 sessions with no benefit.  Traction aggravated her pain.     Bilateral L4-5 TF RY 1/22/2018 with 80% relief of bilateral low back and radicular pain.  Still residual LEFT radiculitis noted.  Left  L5-S1 TF RY 4/2/2018 with 85% relief of left leg pain.      -Medications:  Over-the-counter Aleve, 2 tablets in the morning and occasionally 2 in the afternoon with some relief.  Gabapentin 100 mg 2-0-2 with some dizzines loopiness s/ side effec, however tolerable.    Patient Active Problem List   Diagnosis     Hypercholesterolemia     Decrease In Height     Chronic Reflux Esophagitis     Epistaxis     Benign Polyps Of The Large Intestine     Bartholin Gland Cyst     Elevated liver enzymes     Osteoporosis     Essential hypertension       Current Outpatient Medications on File Prior to Encounter   Medication Sig Dispense Refill     amLODIPine (NORVASC) 5 MG tablet Take 1 tablet (5 mg total) by mouth daily. 90 tablet 3     lisinopril-hydrochlorothiazide (ZESTORETIC) 20-25 mg per tablet Take 1 tablet by mouth daily. In the AM for high Blood Pressure 90 tablet 3     MULTIVIT-MINERALS/FERROUS FUM (MULTI VITAMIN ORAL) Take 1 tablet by mouth daily.       naproxen sodium (ALEVE) 220 MG tablet Take 1-2 tablets by mouth every 6 (six) hours as needed.       pantoprazole (PROTONIX) 40 MG tablet Take 40 mg by mouth daily.       [DISCONTINUED] gabapentin (NEURONTIN) 100 MG capsule Take 300 mg by mouth 3 (three) times a day. Follow Gabapentin Dosing chart given 270 capsule 3     No current facility-administered medications on file prior to encounter.        No Known Allergies    Past Medical History:   Diagnosis Date     Hyperlipidemia      Hypertension      Osteoporosis      Stomach ulcer         Review of Systems  ROS: Positive for numbness and tingling, perceived weakness, balance changes.  Specifically negative for bowel/bladder dysfunction, headache, dizziness, foot drop, fevers, chills, appetite changes, nausea/vomiting, unexplained weight loss. Otherwise 13 systems reviewed are negative. Please see the patient's intake questionnaire from today for details.    Reviewed Social, Family, Past Medical and Past Surgical history  with patient, no significant changes noted since prior visit.     Objective:     /68 (Patient Site: Right Arm, Patient Position: Sitting)   Pulse 83   Wt 197 lb (89.4 kg)   LMP 07/08/1992   SpO2 95%   BMI 32.78 kg/m      PHYSICAL EXAMINATION:    --CONSTITUTIONAL: Well developed, well nourished, healthy appearing individual.  --PSYCHIATRIC: Appropriate mood and affect. No difficulty interacting due to temper, social withdrawal, or memory issues.  --SKIN: Lumbar region is dry and intact.   --RESPIRATORY: Normal rhythm and effort. No abnormal accessory muscle breathing patterns noted.   --MUSCULOSKELETAL:  Normal lumbar lordosis noted, no lateral shift.  --GROSS MOTOR: Easily arises from a seated position. Gait is non-antalgic  --LUMBAR SPINE:  Inspection reveals no evidence of deformity.   --LOWER EXTREMITY MOTOR TESTING:  Plantar flexion left 5/5, right 5/5   Dorsiflexion left 5/5, right 5/5   Great toe MTP extension left 5/5, right 5/5  Knee flexion left 5/5, right 5/5  Knee extension left 5/5, right 5/5   Hip flexion left 5/5, right 5/5  Hip abduction left 5/5, right 5/5  Hip adduction left 5/5, right 5/5   --NEUROLOGIC: Bilateral patellar and achilles reflexes are physiologic and symmetric. Sensation to light touch is intact in the bilateral L4, L5, and S1 dermatomes.    RESULTS:   Imaging: Lumbar spine imaging was reviewed today. The images were shown to the patient and the findings were explained using a spine model.      XR LUMBAR SPINE FLEX AND EXT 2 OR 3 VWS  2/19/2018   INDICATION: L4-L5 spondylolisthesis. Evaluate stability.  COMPARISON: MRI lumbar spine 01/09/2018.  FINDINGS: Last fully developed interspace is designated L5-S1 as on prior MRI. With this numbering system there is approximately 15 mm anterolisthesis of L4 with respect to L5 which is very similar in both flexion and extension. Severe interspace   narrowing is seen at L4-L5 and L5-S1 with associated endplate sclerosis. More mild  posterior interspace narrowing at L3-L4. Vertebral body heights are preserved.        Mr Lumbar Spine Without Contrast  Result Date: 1/9/2018  INDICATION: Lower back pain. Bilateral L5-S1 radiculitis, left worse than right. Spondylolisthesis. TECHNIQUE: Unenhanced. COMPARISON: Plain films 12/27/2017.   FINDINGS: Five lumbar vertebrae are assumed. Mild convex left lumbar curvature.   T11-T12: Disc dehydration. Slight disc space loss. Mild interbody spurring. Small shallow central disc protrusion. Facet joints negative. No significant central canal or foraminal stenosis.   T12-L1: Disc dehydration. Mild disc space loss. Small to moderate-sized right paracentral disc protrusion mildly effaces the ventral thecal sac on the right. Facet joints negative. No significant central canal stenosis. No foraminal stenosis.   L1-L2: Disc dehydration. Mild disc space loss. Annular bulge and small shallow central to right paracentral disc protrusion. Facet joints negative. No central canal or foraminal stenosis.   L2-L3: Normal disc signal and height. Facet joints negative. No significant central canal or foraminal stenosis.   L3-L4: Disc dehydration. Mild disc space loss. Mild annular bulge. Mild degenerative facet arthropathy. Ligamentum flavum thickening. No central canal stenosis. No foraminal stenosis.   L4-L5: 6 mm of anterolisthesis of L4 on L5. Disc dehydration. Moderate disc space loss. Mild discogenic marrow edema. Mild annular bulge. Advanced degenerative facet arthropathy. Ligamentum flavum thickening. Severe central canal stenosis and right worse  than left lateral recess stenosis. Moderate right and mild left foraminal stenosis.   L5-S1: Disc dehydration. Moderate to advanced disc space loss. Mild interbody spurring. Annular bulge. Mild degenerative facet arthropathy. No significant central canal stenosis. No foraminal stenosis.   Conus and intraspinal contents otherwise negative.   No significant marrow signal  abnormality.   No significant paravertebral soft tissue abnormality. Colonic diverticulosis. Hiatal hernia.   CONCLUSION:   1.  Degenerative changes lumbar spine as described above.   2.  Low-grade degenerative spondylolisthesis L4 on L5.   3.  At L4-L5, there is severe central canal stenosis, right worse than left lateral recess stenosis, and moderate right and mild left foraminal stenosis due to the degenerative changes and spondylolisthesis.        Xr Thoracic Spine 2 Vws  Result Date: 12/28/2017  INDICATION: Age-related osteoporosis without current pathological fracture. COMPARISON: None. FINDINGS: Mild right convexity mid thoracic curvature and exaggeration of the normal thoracic kyphosis. No definite acute compression fracture deformity though the evaluation of the upper thoracic levels is limited by overlying soft tissue and shoulder artifact. Moderate midthoracic degenerative disc disease. Visualized lungs are clear.           Xr Lumbar Spine 2 Or 3 Vws  Result Date: 12/28/2017  INDICATION: Sciatica, left side. COMPARISON: None. FINDINGS: Nomenclature based on 5 lumbar-type vertebral bodies. 12 mm anterolisthesis of L4 on L5 and 6 mm anterolisthesis L5 on S1. Alignment is otherwise normal. Vertebral body heights are maintained. Moderate to marked loss of disc space height at L4-L5 and marked changes at L5-S1. Moderate to marked lower lumbar facet arthropathy. Mild degenerative change about the hip and sacroiliac joints.

## 2021-06-22 NOTE — PATIENT INSTRUCTIONS - HE
Discussed the importance of core strengthening, ROM, stretching exercises with the patient and how each of these entities is important in decreasing pain.  Explained to the patient that the purpose of physical therapy is to teach the patient a home exercise program.  These exercises need to be performed every day in order to decrease pain and prevent future occurrences of pain.        ~Please call Nurse Navigation line [phone] with any questions or concerns about your treatment plan, if symptoms worsen and you would like to be seen urgently, or if you have problems controlling bladder and bowel function.  ~Follow Up Appointment time slots with Xochilt Blandon CNP with [facility], are also available at the [facility] location near [facility] on the first and third THURSDAY afternoons of each month.

## 2021-06-22 NOTE — PROGRESS NOTES
Assessment:     Diagnoses and all orders for this visit:    Chronic bilateral low back pain with bilateral sciatica  -     Ambulatory referral to PT/OT    Lumbar radiculitis  -     Ambulatory referral to PT/OT    Spondylolisthesis, lumbar region  -     Ambulatory referral to PT/OT    Lumbar degenerative disc disease  -     Ambulatory referral to PT/OT    Spinal stenosis of lumbar region without neurogenic claudication  -     Ambulatory referral to PT/OT    Thoracic myofascial strain, initial encounter  -     Ambulatory referral to PT/OT    Left lumbar radiculopathy  -     OPS TFESI Lumbar Sacral Unilateral  -     Ambulatory referral to PT/OT       Stacey Allen is a 73 y.o. y.o. female with past medical history significant for hypercholesterolemia, chronic reflux esophagitis, epistaxis, elevated liver enzymes, osteoporosis, large intestine benign polyp who presents today for follow-up regarding:    -2 weeks post bilateral L4-5 TF RY with significant relief times 2 days only.  Ongoing chronic bilateral low back pain and bilateral lumbar radiculitis left greater than right with constant left lower extremity paresthesias.  Patient does have severe central canal stenosis at L4-5 due to spondylolisthesis with bilateral lateral recess stenosis.  Mild bilateral foraminal stenosis L5-S1.    -Acute midline generalized thoracic spine pain most consistent with myofascial muscle strain.  No pinpoint tenderness.    Patient is neurologically intact on exam.    We did discuss surgical options for her chronic low back and leg pain, she states her symptoms are tolerable right bowel therefore at this time she would prefer to hold off on surgical options however she is aware that at any time if she fails to get further relief with physical therapy, medications and injections she would be a surgical candidate.     Plan:     A shared decision making plan was used. The patient's values and choices were respected. Prior medical records  from 12/20/18 were reviewed today. The following represents what was discussed and decided upon by the provider and the patient.        -DIAGNOSTIC TESTS: Images were personally reviewed and interpreted.   --Lower extremity EMG on the left reveals mild sensory motor polyneuropathy without any radiculopathy noted.  --Flexion-extension x-ray reveals no instability.  --Lumbar spine MRI 1/9/2018 does reveal L4-5 6 mm spondylolisthesis with discogenic marrow edema, advanced facet arthropathy with severe central canal and right greater than left lateral recess stenosis.  Moderate right and mild left foraminal stenosis.  L5-S1 moderate to advanced disc height loss with no central stenosis, mild bilateral foraminal stenosis not noted on radiology report however).  --Lumbar spine x-ray 12/27/2017 reveals 12 mm anterolisthesis L4-5 and 6 mm L5-S1.  Moderate to marked disc height loss at L4-5 and marked at L5-S1 with moderate to marked lumbar facet arthropathy.  --Thoracic spine x-ray reveals exaggeration of thoracic kyphosis without compression fracture.  Moderate midthoracic degenerative disc disease.    -INTERVENTIONS: Did order a left L5-S1 transforaminal epidural steroid injection see if we get further relief of her left lumbar radiculitis which she did get good relief from previously April 2018.  She does have foraminal stenosis at this level.    -MEDICATIONS: Advised patient continue gabapentin 100 mg 2 tablets in the morning 2 in the afternoon, we did discuss increasing her nighttime dose to 3 tablets at nighttime to see if it will help with some of her muscle cramping she is having at nighttime, likely related to the nerve compression..  -, Patient has discontinued NSAIDs due to GI upset.  Discussed side effects of medications and proper use. Patient verbalized understanding.    -PHYSICAL THERAPY: Encouraged patient continue physical therapy, did place a new order to add on her more acute thoracic pain likely  myofascial strain in nature.  Discussed the importance of core strengthening, ROM, stretching exercises with the patient and how each of these entities is important in decreasing pain.  Explained to the patient that the purpose of physical therapy is to teach the patient a home exercise program.  These exercises need to be performed every day in order to decrease pain and prevent future occurrences of pain.        -PATIENT EDUCATION:  25 minutes of total visit time was spent face to face with the patient today, 60 % of the visit was spent on counseling, education, and coordinating care.   -5 minutes spent outside of visit time, non-face-to-face time, reviewing chart.    -FOLLOW UP: Follow-up for injection then 2 weeks postinjection.  Advised to contact clinic if symptoms worsen or change.    Subjective:     Staecy Allen is a 73 y.o. female who presents today for follow-up regarding 2 weeks post bilateral L4-5 transfemoral epidural steroid injection in which she did receive significant relief for the first couple of days and she felt great but then her pain returned.  Patient does report constant chronic low back pain that radiates in the posterior thighs and posterior calves as well as lateral thighs, her left side is always most bothersome and she does have constant numbness and tingling in her left lower extremity as well.  Currently her left leg pain is a 6/10 up to a 9 at its worst.  Patient does feel slight weakness in her lower extremity but denies any episodes of her legs giving out on her, denies recent trips or falls or balance changes, denies bowel or bladder dysfunction.    Patient does endorse new thoracic spine pain generalized throughout that she feels is more muscular in nature as well, no recent injury or falls and this is intermittent at this time.    -Treatment to Date: No prior spinal surgery or spinal injection.  Physical therapy ×5 sessions with no benefit.  Traction aggravated her  pain.  Physical therapy [facility] x1 session eval 12/20/2018.     Bilateral L4-5 TF RY 1/22/2018 with 80% relief of bilateral low back and radicular pain.  Still residual LEFT radiculitis noted.  Left L5-S1 TF RY 4/2/2018 with 85% relief of left leg pain.  Bilateral L4-5 TF RY 12/26/2018 with significant relief times 2 days only, then minimal relief of low back and bilateral leg pain, most significant left leg pain remains.  Preprocedure pain 5/10, post 3/10.      -Medications:  Over-the-counter Aleve, 2 tablets in the morning and occasionally 2 in the afternoon with some relief.  Gabapentin 100 mg 2-0-2 with some dizzines loopiness s/ side effec, however tolerable.    Patient Active Problem List   Diagnosis     Hypercholesterolemia     Decrease In Height     Chronic Reflux Esophagitis     Epistaxis     Benign Polyps Of The Large Intestine     Bartholin Gland Cyst     Elevated liver enzymes     Osteoporosis     Essential hypertension       Current Outpatient Medications on File Prior to Encounter   Medication Sig Dispense Refill     amLODIPine (NORVASC) 5 MG tablet Take 1 tablet (5 mg total) by mouth daily. 90 tablet 3     gabapentin (NEURONTIN) 100 MG capsule Take 200 mg by mouth 3 (three) times a day. Follow Gabapentin Dosing chart given 180 capsule 3     lisinopril-hydrochlorothiazide (ZESTORETIC) 20-25 mg per tablet Take 1 tablet by mouth daily. In the AM for high Blood Pressure 90 tablet 3     MULTIVIT-MINERALS/FERROUS FUM (MULTI VITAMIN ORAL) Take 1 tablet by mouth daily.       naproxen sodium (ALEVE) 220 MG tablet Take 1-2 tablets by mouth every 6 (six) hours as needed.       pantoprazole (PROTONIX) 40 MG tablet Take 40 mg by mouth daily.       No current facility-administered medications on file prior to encounter.        No Known Allergies    Past Medical History:   Diagnosis Date     Hyperlipidemia      Hypertension      Osteoporosis      Stomach ulcer         Review of Systems  ROS: Positive for  numbness and tingling, perceived weakness, headache, dizziness.  Specifically negative for bowel/bladder dysfunction, balance changes, foot drop, fevers, chills, appetite changes, nausea/vomiting, unexplained weight loss. Otherwise 13 systems reviewed are negative. Please see the patient's intake questionnaire from today for details.    Reviewed Social, Family, Past Medical and Past Surgical history with patient, no significant changes noted since prior visit.     Objective:     /64 (Patient Site: Right Arm, Patient Position: Sitting)   Pulse 78   Wt 195 lb (88.5 kg)   LMP 07/08/1992   SpO2 95%   BMI 32.45 kg/m      PHYSICAL EXAMINATION:    --CONSTITUTIONAL: Well developed, well nourished, healthy appearing individual.  --PSYCHIATRIC: Appropriate mood and affect. No difficulty interacting due to temper, social withdrawal, or memory issues.  --SKIN: Lumbar region is dry and intact.   --RESPIRATORY: Normal rhythm and effort. No abnormal accessory muscle breathing patterns noted.   --MUSCULOSKELETAL:  Normal lumbar lordosis noted, no lateral shift.  --GROSS MOTOR: Easily arises from a seated position. Gait is non-antalgic  --LUMBAR SPINE:  Inspection reveals no evidence of deformity.   --LOWER EXTREMITY MOTOR TESTING:  Plantar flexion left 5/5, right 5/5   Dorsiflexion left 5/5, right 5/5   Great toe MTP extension left 5/5, right 5/5  Knee flexion left 5/5, right 5/5  Knee extension left 5/5, right 5/5   Hip flexion left 5/5, right 5/5  Hip abduction left 5/5, right 5/5  Hip adduction left 5/5, right 5/5   --NEUROLOGIC: Bilateral patellar and achilles reflexes are physiologic and symmetric. Sensation to light touch is intact in the bilateral L4, L5, and S1 dermatomes.    RESULTS:   Imaging: Lumbar spine imaging was reviewed today. The images were shown to the patient and the findings were explained using a spine model.      XR LUMBAR SPINE FLEX AND EXT 2 OR 3 VWS  2/19/2018   INDICATION: L4-L5  spondylolisthesis. Evaluate stability.  COMPARISON: MRI lumbar spine 01/09/2018.  FINDINGS: Last fully developed interspace is designated L5-S1 as on prior MRI. With this numbering system there is approximately 15 mm anterolisthesis of L4 with respect to L5 which is very similar in both flexion and extension. Severe interspace   narrowing is seen at L4-L5 and L5-S1 with associated endplate sclerosis. More mild posterior interspace narrowing at L3-L4. Vertebral body heights are preserved.        Mr Lumbar Spine Without Contrast  Result Date: 1/9/2018  INDICATION: Lower back pain. Bilateral L5-S1 radiculitis, left worse than right. Spondylolisthesis. TECHNIQUE: Unenhanced. COMPARISON: Plain films 12/27/2017.   FINDINGS: Five lumbar vertebrae are assumed. Mild convex left lumbar curvature.   T11-T12: Disc dehydration. Slight disc space loss. Mild interbody spurring. Small shallow central disc protrusion. Facet joints negative. No significant central canal or foraminal stenosis.   T12-L1: Disc dehydration. Mild disc space loss. Small to moderate-sized right paracentral disc protrusion mildly effaces the ventral thecal sac on the right. Facet joints negative. No significant central canal stenosis. No foraminal stenosis.   L1-L2: Disc dehydration. Mild disc space loss. Annular bulge and small shallow central to right paracentral disc protrusion. Facet joints negative. No central canal or foraminal stenosis.   L2-L3: Normal disc signal and height. Facet joints negative. No significant central canal or foraminal stenosis.   L3-L4: Disc dehydration. Mild disc space loss. Mild annular bulge. Mild degenerative facet arthropathy. Ligamentum flavum thickening. No central canal stenosis. No foraminal stenosis.   L4-L5: 6 mm of anterolisthesis of L4 on L5. Disc dehydration. Moderate disc space loss. Mild discogenic marrow edema. Mild annular bulge. Advanced degenerative facet arthropathy. Ligamentum flavum thickening. Severe  central canal stenosis and right worse  than left lateral recess stenosis. Moderate right and mild left foraminal stenosis.   L5-S1: Disc dehydration. Moderate to advanced disc space loss. Mild interbody spurring. Annular bulge. Mild degenerative facet arthropathy. No significant central canal stenosis. No foraminal stenosis.   Conus and intraspinal contents otherwise negative.   No significant marrow signal abnormality.   No significant paravertebral soft tissue abnormality. Colonic diverticulosis. Hiatal hernia.   CONCLUSION:   1.  Degenerative changes lumbar spine as described above.   2.  Low-grade degenerative spondylolisthesis L4 on L5.   3.  At L4-L5, there is severe central canal stenosis, right worse than left lateral recess stenosis, and moderate right and mild left foraminal stenosis due to the degenerative changes and spondylolisthesis.        Xr Thoracic Spine 2 Vws  Result Date: 12/28/2017  INDICATION: Age-related osteoporosis without current pathological fracture. COMPARISON: None. FINDINGS: Mild right convexity mid thoracic curvature and exaggeration of the normal thoracic kyphosis. No definite acute compression fracture deformity though the evaluation of the upper thoracic levels is limited by overlying soft tissue and shoulder artifact. Moderate midthoracic degenerative disc disease. Visualized lungs are clear.           Xr Lumbar Spine 2 Or 3 Vws  Result Date: 12/28/2017  INDICATION: Sciatica, left side. COMPARISON: None. FINDINGS: Nomenclature based on 5 lumbar-type vertebral bodies. 12 mm anterolisthesis of L4 on L5 and 6 mm anterolisthesis L5 on S1. Alignment is otherwise normal. Vertebral body heights are maintained. Moderate to marked loss of disc space height at L4-L5 and marked changes at L5-S1. Moderate to marked lower lumbar facet arthropathy. Mild degenerative change about the hip and sacroiliac joints.

## 2021-06-22 NOTE — PROGRESS NOTES
[facility] Certification Request    January 9, 2019      Patient: Stacey Allen  MR Number: [id number]  YOB: 1945  Date of Visit: 1/9/2019      Dear Xochilt Blandon:    Thank you for this referral.   We are seeing Stacey Allen for Physical Therapy of chronic lumbar pain/radiculopathy.    Medicare and/or Medicaid requires physician review and approval of the treatment plan. Please review the plan of care and verify that you agree with the therapy plan of care by co-signing this note.      Plan of Care  Authorization / Certification Start Date: 01/09/19  Authorization / Certification End Date: 04/09/19  Authorization / Certification Number of Visits: 12  Communication with: Referral Source  Patient Related Instruction: Nature of Condition;Treatment plan and rationale;Self Care instruction;Basis of treatment;Body mechanics;Posture  Times per Week: 1-2  Number of Weeks: 8-12  Number of Visits: Up to 16  Discharge Planning: to self-care  Precautions / Restrictions : Osteoporosis, spondylolisthesis >10 mm  Therapeutic Exercise: ROM;Stretching;Strengthening;Other  Therapeutic Exercise : CONSERVATIVE trial of MedX  Neuromuscular Reeducation: posture;core  Manual Therapy: soft tissue mobilization;myofascial release;joint mobilization;muscle energy      Goals:  Pt. will demonstrate/verbalize independence in self-management of condition in : 4 weeks  Pt. will be independent with home exercise program in : 4 weeks    Pt will: report decreased sx by 50% in 4 weeks  Pt will: reach overhead without increased pain in 4 weeks  Pt will: improve her MedX strength by 30# on average in 4 weeks  Pt will: perform stairs without difficulty in 4 weeks        If you have any questions or concerns, please don't hesitate to call.    Sincerely,      Sol Brennan, PT        Physician recommendation:     ___ Follow therapist's recommendation        ___ Modify therapy      *Physician co-signature indicates they  certify the need for these services furnished within this plan and while under their care.      [facility]   [facility]ico-Thoracic Initial Evaluation    Patient Name: Stacey Allen  Date of evaluation: 1/9/2019  Referral Diagnosis: Chronic bilateral low back pain with bilateral sciatica   Referring provider: Xochilt Blandon C*  Visit Diagnosis:     ICD-10-CM    1. Chronic bilateral low back pain with bilateral sciatica M54.42     M54.41     G89.29    2. Lumbar radiculitis M54.16    3. Spondylolisthesis, lumbar region M43.16    4. Lumbar degenerative disc disease M51.36    5. Spinal stenosis of lumbar region without neurogenic claudication M48.061        Assessment:      Stacey Allen is a 73 y.o. female with PMH significant for hypercholesterolemia, chronic reflux esophagitis, epistaxis, elevated liver enzymes, osteoporosis, spondylolisthesis, large intestine benign polyp who presents to therapy today with chief complaints of chronic low back pain and leg pain kim.  Pt sx began worsening since September 2017.  She has had 2 injections with some relief, and has tried PT ad [place] with no relief.  On exam pt demonstrates significant pain and peripheral sx with spine extension, negative SLUMP and SLR, some hypomobility in her hips L>R, significant weakness on MedX testing today.  Pt would benefit from skilled PT to address the above limitations.    X-ray in Epic on 2/7/18: approximately 15 mm anterolisthesis of L4 with respect to L5 which is very similar in both flexion and extension. Severe interspace   narrowing is seen at L4-L5 and L5-S1 with associated endplate sclerosis. More mild posterior interspace narrowing at L3-L4. Vertebral body heights are preserved.    POC and pathology of condition were reviewed with patient.  Pt. is in agreement with the Plan of Care    Goals:  Pt. will demonstrate/verbalize independence in self-management of condition in : 4 weeks  Pt. will be independent with  "home exercise program in : 4 weeks    Pt will: report decreased sx by 50% in 4 weeks  Pt will: reach overhead without increased pain in 4 weeks  Pt will: improve her MedX strength by 30# on average in 4 weeks  Pt will: perform stairs without difficulty in 4 weeks      Patient's expectations/goals are realistic.    Barriers to Learning or Achieving Goals:  Chronicity of the problem.        Plan / Patient Instructions:        Plan of Care:   Authorization / Certification Start Date: 01/09/19  Authorization / Certification End Date: 04/09/19  Authorization / Certification Number of Visits: 12  Communication with: Referral Source  Patient Related Instruction: Nature of Condition;Treatment plan and rationale;Self Care instruction;Basis of treatment;Body mechanics;Posture  Times per Week: 1-2  Number of Weeks: 8-12  Number of Visits: Up to 16  Discharge Planning: to self-care  Precautions / Restrictions : Osteoporosis, spondylolisthesis >10 mm  Therapeutic Exercise: ROM;Stretching;Strengthening;Other  Therapeutic Exercise : CONSERVATIVE trial of MedX  Neuromuscular Reeducation: posture;core  Manual Therapy: soft tissue mobilization;myofascial release;joint mobilization;muscle energy      Plan for next visit: Trial MedX DE, I would hold rotary torso until pt is tolerating other exercises well.  Abdominal strengthening, piriformis stretches as able, significant focus on neutral spine and stability.     Subjective:         Social information:   Occupation: Substitute teaching for elementary   Work Status:Working part time    History of Present Illness:    Stacey is a 73 y.o. female who presents to therapy today with complaints of chronic low back pain.  Onset September 2017, LILA no known.  Location: \"mostly in my legs\", \"now my upper back around [place] started hurting\". Timing: worse at the end of the day. Alleviating factors: none.  Titrating up on gabapentin 2x 3x a day  Functional limitations:    Standing too " long   Stairs - numb leg   Reaching overhead   Yard work/house work    Severity:   Pain Ratin   Pain rating at best: 6   Pain rating at worst: 9  Quality/Description: Upper back is achy, legs are numb all the time    Previous Activity Level: walking    Pertinent Past Medical History: osteoporosis    Associated symptoms:                         Numbness: Not entirely but diminished                        Weakness: None     Fracture:    1. History of trauma -    2. Prolonged use of steroids -    3. Age over 70 +    4. History of breast cancer (estrogen inhibiting drugs) -     Cauda Equina Sx    1. Urine retention or incontinence -    2. Fecal incontinence -    3. Saddle anesthesia -    4. Global or Progressive Weakness -     Spine related tumor:    1. Age >50 -    2. Hx of cancer -    3. Unexplained weight loss >10#     4. Failure with conservative Tx -     Spinal Osteomyelitis/Discitis    1. Recent infection (UTI, skin, other internal) -    2. Fevers/Chills -      Objective:      Note: Items left blank indicates the item was not performed or not indicated at the time of the evaluation.    Patient Outcome Measures :    Modified Oswestry Low Back Pain Disablity Questionnaire  in %: 26     Scores range from 0-100%, where a score of 0% represents minimal pain and maximal function. The minimal clinically important difference is a score reduction of 12%.    Examination  1. Chronic bilateral low back pain with bilateral sciatica     2. Lumbar radiculitis     3. Spondylolisthesis, lumbar region     4. Lumbar degenerative disc disease     5. Spinal stenosis of lumbar region without neurogenic claudication       Precautions/Restrictions: Osteoporosis    Posture Observation: Fair/good    Lumbar ROM:    Date: 2019   *Indicate scale AROM   Lumbar Flexion 10 cm   Lumbar Extension Severely limited    Right Left   Lumbar Sidebending Min parrish PN Min parrish PN   Lumbar Rotation Min parrish Min parrish     Lower Extremity Strength:      Date: 1/9/2019   LE strength/5 Right Left   Hip Flexion (L1-3) 5 5   Hip Extension (L5-S1)     Hip Abduction (L4-5)     Hip Adduction (L2-3)     Hip External Rotation     Hip Internal Rotation     Knee Extension (L3-4) 5 5   Knee Flexion 5 5   Ankle Dorsiflexion (L4-5) 5 5   Great Toe Extension (L5) 5 5   Ankle Plantar flexion (S1) 4 4   Abdominals      Lumbar Sensation    Intact to light touch           Lumbar Special Tests:   Lumbar Special Tests Right Left   Slump - -   Straight leg raise - 70 - 80   Crossover response - -   ART - limited -   FADIR Min parrish Min parrish   Hip Scour Test     Rohan Test Min parrish Min parrish   Sharon's Min parrish Min parrish   Hip rotation ROM supine/prone WNL Limited ER   Ely's     Prone instability test    Pubic shotgun      Palpation: Tenderness to lumbar paraspinals, left greater trochanter, TP around the iliac crest kim    Repeated Motion Testing:  Peripheralizes with extension    Passive Mobility - Joint Integrity:  PAs are hypomobile, pt reports non-painful    Prone instability test:  Pt reports more pain from lifting her leg than from PAs    LE Screen:  Mild hip hypomobility R limited IR, L limited both IR/ER         Treatment Today     TREATMENT MINUTES COMMENTS   Evaluation 30    Self-care/ Home management     Manual therapy     Neuromuscular Re-education     Therapeutic Activity     Therapeutic Exercises 25 NuStep warmup, MedX IM.  Explained the MedX program, IM results, goals per MedX   Gait training     Modality__________________                Total 55    Blank areas are intentional and mean the treatment did not include these items.              Sol Brennan, NICHOLAS  1/9/2019  2:04 PM

## 2021-06-23 NOTE — PROGRESS NOTES
Assessment:     Diagnoses and all orders for this visit:    Lumbar radiculitis    Left lumbar radiculopathy    Chronic bilateral low back pain with bilateral sciatica    Spondylolisthesis, lumbar region    Lumbar degenerative disc disease       Stacey Allen is a 73 y.o. y.o. female with past medical history significant for hypercholesterolemia, chronic reflux esophagitis, epistaxis, elevated liver enzymes, osteoporosis, large intestine benign polyp who presents today for follow-up regarding:    -2 weeks post left L5-S1 TF YR in which patient received 90% relief of left lumbar radiculitis is not doing great at this point.  She does have severe central canal stenosis at L4-5 due to spondylolisthesis with bilateral lateral recess stenosis and mild bilateral foraminal stenosis L5-S1.    Patient is neurologically intact on exam and symptoms are tolerable.     Plan:     A shared decision making plan was used. The patient's values and choices were respected. Prior medical records from 1/9/19 were reviewed today. The following represents what was discussed and decided upon by the provider and the patient.        -DIAGNOSTIC TESTS: Images were personally reviewed and interpreted.   --Lower extremity EMG on the left reveals mild sensory motor polyneuropathy without any radiculopathy noted.  --Flexion-extension x-ray reveals no instability.  --Lumbar spine MRI 1/9/2018 does reveal L4-5 6 mm spondylolisthesis with discogenic marrow edema, advanced facet arthropathy with severe central canal and right greater than left lateral recess stenosis.  Moderate right and mild left foraminal stenosis.  L5-S1 moderate to advanced disc height loss with no central stenosis, mild bilateral foraminal stenosis not noted on radiology report however).  --Lumbar spine x-ray 12/27/2017 reveals 12 mm anterolisthesis L4-5 and 6 mm L5-S1.  Moderate to marked disc height loss at L4-5 and marked at L5-S1 with moderate to marked lumbar facet  arthropathy.  --Thoracic spine x-ray reveals exaggeration of thoracic kyphosis without compression fracture.  Moderate midthoracic degenerative disc disease.    -INTERVENTIONS: We did discuss down the road if her pain worsens we would recommend repeating L5-S1 TF RY as this was most effective for her for her left lumbar radiculitis.    -MEDICATIONS: Patient has weaned off gabapentin which is reasonable at this time as she is doing well.  Recommend continuing Tylenol only as needed for pain at this point.  -She had also discontinued NSAIDs due to GI upset.  Discussed side effects of medications and proper use. Patient verbalized understanding.    -PHYSICAL THERAPY: Encouraged patient continue home exercises from physical therapy most importantly at this point we could repeat MedX program down the road if symptoms return.  Patient does feel her home exercises to maintain her strength.  Discussed the importance of core strengthening, ROM, stretching exercises with the patient and how each of these entities is important in decreasing pain.  Explained to the patient that the purpose of physical therapy is to teach the patient a home exercise program.  These exercises need to be performed every day in order to decrease pain and prevent future occurrences of pain.        -PATIENT EDUCATION:  20 minutes of total visit time was spent face to face with the patient today, 60 % of the visit was spent on counseling, education, and coordinating care.   -5 minutes spent outside of visit time, non-face-to-face time, reviewing chart.    -FOLLOW UP: Follow-up as needed.  Advised to contact clinic if symptoms worsen or change.    Subjective:     Stacey Allen is a 73 y.o. female who presents today for follow-up regarding 2 weeks post left L5-S1 transforaminal epidural steroid injection which she did receive 90% relief of her left lumbar radiculitis, still has some left numbness and tingling remaining but her pain is very tolerable  and manageable.  She does still have some bilateral low back pain that is worse with standing too long but it is tolerable.    Patient denies any weakness in her lower extremity's, denies balance changes, denies recent trips or falls, denies bowel or bladder dysfunction.  Patient is happy with the results of this injection.    Patient denies thoracic spine pain today.    -Treatment to Date: No prior spinal surgery or spinal injection.  Physical therapy ×5 sessions with no benefit.  Traction aggravated her pain.  Physical therapy [facility] x1 session eval 12/20/2018.     Bilateral L4-5 TF RY 1/22/2018 with 80% relief of bilateral low back and radicular pain.  Still residual LEFT radiculitis noted.  Left L5-S1 TF RY 4/2/2018 with 85% relief of left leg pain.  Bilateral L4-5 TF RY 12/26/2018 with significant relief times 2 days only, then minimal relief of low back and bilateral leg pain, most significant left leg pain remains.  Preprocedure pain 5/10, post 3/10.  Left L5-S1 TF RY 1/21/2019 with 90% relief.  Preprocedure pain 8/10, post 6/10.      -Medications:  Over-the-counter Aleve, as needed at this point.  Gabapentin 100 mg 2-0-2 with some dizzines loopiness s/ side effec, however tolerable.  Patient has weaned off at this time.    Patient Active Problem List   Diagnosis     Hypercholesterolemia     Decrease In Height     Chronic Reflux Esophagitis     Epistaxis     Benign Polyps Of The Large Intestine     Bartholin Gland Cyst     Elevated liver enzymes     Osteoporosis     Essential hypertension       Current Outpatient Medications on File Prior to Encounter   Medication Sig Dispense Refill     amLODIPine (NORVASC) 5 MG tablet Take 1 tablet (5 mg total) by mouth daily. 90 tablet 3     lisinopril-hydrochlorothiazide (ZESTORETIC) 20-25 mg per tablet Take 1 tablet by mouth daily. In the AM for high Blood Pressure 90 tablet 3     MULTIVIT-MINERALS/FERROUS FUM (MULTI VITAMIN ORAL) Take 1 tablet by mouth  daily.       naproxen sodium (ALEVE) 220 MG tablet Take 1-2 tablets by mouth every 6 (six) hours as needed.       pantoprazole (PROTONIX) 40 MG tablet Take 40 mg by mouth daily.       [DISCONTINUED] gabapentin (NEURONTIN) 100 MG capsule Take 200 mg by mouth 3 (three) times a day. Follow Gabapentin Dosing chart given 180 capsule 3     No current facility-administered medications on file prior to encounter.        No Known Allergies    Past Medical History:   Diagnosis Date     Hyperlipidemia      Hypertension      Osteoporosis      Stomach ulcer         Review of Systems  ROS: Positive for numbness and tingling, headache, dizziness.  Specifically negative for bowel/bladder dysfunction, balance changes, headache, dizziness, foot drop, fevers, chills, appetite changes, nausea/vomiting, unexplained weight loss. Otherwise 13 systems reviewed are negative. Please see the patient's intake questionnaire from today for details.    Reviewed Social, Family, Past Medical and Past Surgical history with patient, no significant changes noted since prior visit.     Objective:     /58 (Patient Site: Right Arm, Patient Position: Sitting)   Pulse 91   Wt 193 lb (87.5 kg)   LMP 07/08/1992   SpO2 98%   BMI 32.12 kg/m      PHYSICAL EXAMINATION:    --CONSTITUTIONAL: Well developed, well nourished, healthy appearing individual.  --PSYCHIATRIC: Appropriate mood and affect. No difficulty interacting due to temper, social withdrawal, or memory issues.  --SKIN: Lumbar region is dry and intact.   --RESPIRATORY: Normal rhythm and effort. No abnormal accessory muscle breathing patterns noted.   --MUSCULOSKELETAL:  Normal lumbar lordosis noted, no lateral shift.  --GROSS MOTOR: Easily arises from a seated position. Gait is non-antalgic  --LUMBAR SPINE:  Inspection reveals no evidence of deformity.    RESULTS:   Imaging: Lumbar spine imaging was reviewed today. The images were shown to the patient and the findings were explained using a  spine model.      XR LUMBAR SPINE FLEX AND EXT 2 OR 3 VWS  2/19/2018   INDICATION: L4-L5 spondylolisthesis. Evaluate stability.  COMPARISON: MRI lumbar spine 01/09/2018.  FINDINGS: Last fully developed interspace is designated L5-S1 as on prior MRI. With this numbering system there is approximately 15 mm anterolisthesis of L4 with respect to L5 which is very similar in both flexion and extension. Severe interspace   narrowing is seen at L4-L5 and L5-S1 with associated endplate sclerosis. More mild posterior interspace narrowing at L3-L4. Vertebral body heights are preserved.        Mr Lumbar Spine Without Contrast  Result Date: 1/9/2018  INDICATION: Lower back pain. Bilateral L5-S1 radiculitis, left worse than right. Spondylolisthesis. TECHNIQUE: Unenhanced. COMPARISON: Plain films 12/27/2017.   FINDINGS: Five lumbar vertebrae are assumed. Mild convex left lumbar curvature.   T11-T12: Disc dehydration. Slight disc space loss. Mild interbody spurring. Small shallow central disc protrusion. Facet joints negative. No significant central canal or foraminal stenosis.   T12-L1: Disc dehydration. Mild disc space loss. Small to moderate-sized right paracentral disc protrusion mildly effaces the ventral thecal sac on the right. Facet joints negative. No significant central canal stenosis. No foraminal stenosis.   L1-L2: Disc dehydration. Mild disc space loss. Annular bulge and small shallow central to right paracentral disc protrusion. Facet joints negative. No central canal or foraminal stenosis.   L2-L3: Normal disc signal and height. Facet joints negative. No significant central canal or foraminal stenosis.   L3-L4: Disc dehydration. Mild disc space loss. Mild annular bulge. Mild degenerative facet arthropathy. Ligamentum flavum thickening. No central canal stenosis. No foraminal stenosis.   L4-L5: 6 mm of anterolisthesis of L4 on L5. Disc dehydration. Moderate disc space loss. Mild discogenic marrow edema. Mild annular  bulge. Advanced degenerative facet arthropathy. Ligamentum flavum thickening. Severe central canal stenosis and right worse  than left lateral recess stenosis. Moderate right and mild left foraminal stenosis.   L5-S1: Disc dehydration. Moderate to advanced disc space loss. Mild interbody spurring. Annular bulge. Mild degenerative facet arthropathy. No significant central canal stenosis. No foraminal stenosis.   Conus and intraspinal contents otherwise negative.   No significant marrow signal abnormality.   No significant paravertebral soft tissue abnormality. Colonic diverticulosis. Hiatal hernia.   CONCLUSION:   1.  Degenerative changes lumbar spine as described above.   2.  Low-grade degenerative spondylolisthesis L4 on L5.   3.  At L4-L5, there is severe central canal stenosis, right worse than left lateral recess stenosis, and moderate right and mild left foraminal stenosis due to the degenerative changes and spondylolisthesis.        Xr Thoracic Spine 2 Vws  Result Date: 12/28/2017  INDICATION: Age-related osteoporosis without current pathological fracture. COMPARISON: None. FINDINGS: Mild right convexity mid thoracic curvature and exaggeration of the normal thoracic kyphosis. No definite acute compression fracture deformity though the evaluation of the upper thoracic levels is limited by overlying soft tissue and shoulder artifact. Moderate midthoracic degenerative disc disease. Visualized lungs are clear.           Xr Lumbar Spine 2 Or 3 Vws  Result Date: 12/28/2017  INDICATION: Sciatica, left side. COMPARISON: None. FINDINGS: Nomenclature based on 5 lumbar-type vertebral bodies. 12 mm anterolisthesis of L4 on L5 and 6 mm anterolisthesis L5 on S1. Alignment is otherwise normal. Vertebral body heights are maintained. Moderate to marked loss of disc space height at L4-L5 and marked changes at L5-S1. Moderate to marked lower lumbar facet arthropathy. Mild degenerative change about the hip and sacroiliac  joints.

## 2021-06-23 NOTE — PROGRESS NOTES
[facility] Discharge Summary  Patient Name: Stacey Allen  Date: 4/22/2019  Visit Number: 4/16  Referral Diagnosis: Chronic bilateral low back pain with bilateral sciatica   Referring provider: Xochilt Blandon CNP  Visit Diagnosis:   1. Chronic bilateral low back pain with bilateral sciatica     2. Lumbar radiculitis     3. Spondylolisthesis, lumbar region         Goals:  Pt. will demonstrate/verbalize independence in self-management of condition in : 4 weeks: Not met  Pt. will be independent with home exercise program in : 4 weeks: Not met   Pt will: report decreased sx by 50% in 4 weeks: Not met  Pt will: reach overhead without increased pain in 4 weeks: Not met  Pt will: improve her MedX strength by 30# on average in 4 weeks: Not met  Pt will: perform stairs without difficulty in 4 weeks: Not met    Patient was seen for 4 visits from 1/9/19 to 1/16/19 with 0 missed appointments.  The patient discontinued therapy, did not return.    Therapy will be discontinued at this time.  The patient will need a new referral to resume.    Thank you for your referral.  Sol Brennan DPT  4/22/2019  11:31 AM      [facility] Daily Progress Note  Patient Name: Stacey Allen  Date of evaluation: 1/9/2019  Today's Date: 1/16/2019   Visit Number: 4/16  Referral Diagnosis: Chronic bilateral low back pain with bilateral sciatica   Referring provider: Xochilt Blandon CNP  Visit Diagnosis:     ICD-10-CM    1. Chronic bilateral low back pain with bilateral sciatica M54.42     M54.41     G89.29    2. Lumbar radiculitis M54.16    3. Spondylolisthesis, lumbar region M43.16        Assessment:       Patient has done MedX for one week now.  She is reporting some soreness after using the machines each time, but it improves after a day or so and she is willing to continue. We are keeping the resistance and ROM on medx conservative d/t known spondy. as well as rotary torso.  Patient has asked about surgical consult  "and was encouraged to discuss with referring provider.     From eval:   Stacey Allen is a 73 y.o. female with PMH significant for hypercholesterolemia, chronic reflux esophagitis, epistaxis, elevated liver enzymes, osteoporosis, spondylolisthesis, large intestine benign polyp who presents to therapy today with chief complaints of chronic low back pain and leg pain kim.  Pt sx began worsening since September 2017.  She has had 2 injections with some relief, and has tried PT ad [place] with no relief.  On exam pt demonstrates significant pain and peripheral sx with spine extension, negative SLUMP and SLR, some hypomobility in her hips L>R, significant weakness on MedX testing today.  Pt would benefit from skilled PT to address the above limitations.   X-ray in Epic on 2/7/18: approximately 15 mm anterolisthesis of L4 with respect to L5 which is very similar in both flexion and extension. Severe interspace   narrowing is seen at L4-L5 and L5-S1 with associated endplate sclerosis. More mild posterior interspace narrowing at L3-L4. Vertebral body heights are preserved.    Goals:  Pt. will demonstrate/verbalize independence in self-management of condition in : 4 weeks  Pt. will be independent with home exercise program in : 4 weeks    Pt will: report decreased sx by 50% in 4 weeks  Pt will: reach overhead without increased pain in 4 weeks  Pt will: improve her MedX strength by 30# on average in 4 weeks  Pt will: perform stairs without difficulty in 4 weeks          Plan / Patient Instructions:      Plan for next visit: Trial MedX DE, I would hold rotary torso until pt is tolerating other exercises well, possibility of increasing seat cushion on MedX.  Abdominal strengthening, piriformis stretches as able, significant focus on neutral spine and stability.  Test her abdominals march, may consider plank progression.     Subjective:        Pain today is mild. \"I have done nothing today\".    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     Exercises:  Exercise #1: Nustep warm up level 5  Comment #1: rotary torso, smallest notch, 20# 10 reps each direction  Exercise #2: supine LTR, supine SKTC, Supine SLR slider, Supine pretzel stretch for hip rotation  Comment #2: Ab set + march 20x  Exercise #3: Supine pull downs L3 band 10x  Comment #3: Standing multifidi rows 20x (not very difficult)    Lumbar MedX (Keep conservative!!)  Enter Week / Visit #: Wk 2 V 1  Weight (lbs): 73#  Reps (#): 20  Time: 139  ROM (degrees): 0-42  Pain: \"mild\"  Flex:Ext ratio: 5.33:1           Treatment Today     TREATMENT MINUTES COMMENTS   Evaluation     Self-care/ Home management     Manual therapy     Neuromuscular Re-education     Therapeutic Activity     Therapeutic Exercises 27 See above. Patient reports she felt better after medX today, back pain was gone, but left leg still impaired.   Gait training     Modality__________________                Total 27    Blank areas are intentional and mean the treatment did not include these items.          Sol Brennan, DPT  1/16/2019    "

## 2021-06-23 NOTE — PROGRESS NOTES
[facility] Daily Progress Note  Patient Name: Stacey Allen  Date of evaluation: 1/9/2019  Today's Date: 1/11/2019   Visit Number: 2/16  Referral Diagnosis: Chronic bilateral low back pain with bilateral sciatica   Referring provider: Xochilt Blandon CNP  Visit Diagnosis:     ICD-10-CM    1. Chronic bilateral low back pain with bilateral sciatica M54.42     M54.41     G89.29    2. Lumbar radiculitis M54.16    3. Spondylolisthesis, lumbar region M43.16    4. Lumbar degenerative disc disease M51.36        Assessment:       Patient started exercise on MedX lumbar machine today. She states she actually felt better after doing the exercise today. We are keeping the resistance and ROM on medx conservative d/t known spondy. As well as rotary torso. Patient asked about surgical consult today and was encouraged to discuss with referring provider.     From eval:   Stacey Allen is a 73 y.o. female with PMH significant for hypercholesterolemia, chronic reflux esophagitis, epistaxis, elevated liver enzymes, osteoporosis, spondylolisthesis, large intestine benign polyp who presents to therapy today with chief complaints of chronic low back pain and leg pain kim.  Pt sx began worsening since September 2017.  She has had 2 injections with some relief, and has tried PT ad [place] with no relief.  On exam pt demonstrates significant pain and peripheral sx with spine extension, negative SLUMP and SLR, some hypomobility in her hips L>R, significant weakness on MedX testing today.  Pt would benefit from skilled PT to address the above limitations.   X-ray in Epic on 2/7/18: approximately 15 mm anterolisthesis of L4 with respect to L5 which is very similar in both flexion and extension. Severe interspace   narrowing is seen at L4-L5 and L5-S1 with associated endplate sclerosis. More mild posterior interspace narrowing at L3-L4. Vertebral body heights are preserved.    Goals:  Pt. will demonstrate/verbalize independence in  self-management of condition in : 4 weeks  Pt. will be independent with home exercise program in : 4 weeks    Pt will: report decreased sx by 50% in 4 weeks  Pt will: reach overhead without increased pain in 4 weeks  Pt will: improve her MedX strength by 30# on average in 4 weeks  Pt will: perform stairs without difficulty in 4 weeks          Plan / Patient Instructions:      Plan for next visit: Trial MedX DE, I would hold rotary torso until pt is tolerating other exercises well.  Abdominal strengthening, piriformis stretches as able, significant focus on neutral spine and stability.     Subjective:        Pretty sore today. Did feel sore after testing on MedX 2 days ago.   Both legs felt like tree trunks that she was carrying around today.     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     Exercises:  Exercise #1: supine LTR, supine SKTC, Supine SLR slider, Supine pretzel stretch for hip rotation    Lumbar MedX (Keep conservative!!)  Enter Week / Visit #: WK 1 V 1  Weight (lbs): #/AVG 97#  Reps (#): -  Time: -  ROM (degrees): 0-42  Pain: Sore after pushing, temporary  Flex:Ext ratio: 5.33:1             Treatment Today     TREATMENT MINUTES COMMENTS   Evaluation     Self-care/ Home management     Manual therapy     Neuromuscular Re-education     Therapeutic Activity     Therapeutic Exercises 30 See above. Patient reports she felt better after medX today, back pain was gone, but left leg still impaired.   Gait training     Modality__________________                Total 30    Blank areas are intentional and mean the treatment did not include these items.              Kylah Brady, DPT  1/11/2019

## 2021-06-23 NOTE — PATIENT INSTRUCTIONS - HE
Discussed the importance of core strengthening, ROM, stretching exercises with the patient and how each of these entities is important in decreasing pain.  Explained to the patient that the purpose of physical therapy is to teach the patient a home exercise program.  These exercises need to be performed every day in order to decrease pain and prevent future occurrences of pain.        ~Please call Nurse Navigation line [phone] with any questions or concerns about your treatment plan, if symptoms worsen and you would like to be seen urgently, or if you have problems controlling bladder and bowel function.  ~Follow Up Appointment time slots with Xochilt Blandon CNP with [facility], are also available at the [facility] location near [facility] on the first and third THURSDAY afternoons of each month.

## 2021-06-23 NOTE — PROGRESS NOTES
[facility] Daily Progress Note  Patient Name: Stacey Allen  Date of evaluation: 1/9/2019  Today's Date: 1/14/2019   Visit Number: 2/16  Referral Diagnosis: Chronic bilateral low back pain with bilateral sciatica   Referring provider: Xochilt Blandon CNP  Visit Diagnosis:     ICD-10-CM    1. Chronic bilateral low back pain with bilateral sciatica M54.42     M54.41     G89.29    2. Lumbar radiculitis M54.16    3. Spondylolisthesis, lumbar region M43.16    4. Lumbar degenerative disc disease M51.36    5. Spinal stenosis of lumbar region without neurogenic claudication M48.061        Assessment:       Patient has done MedX for one week now.  She is reporting some soreness after using the machines each time, but it improves after a day or so and she is willing to continue. We are keeping the resistance and ROM on medx conservative d/t known spondy. as well as rotary torso.  Patient has asked about surgical consult and was encouraged to discuss with referring provider.     From eval:   Stacey Allen is a 73 y.o. female with PMH significant for hypercholesterolemia, chronic reflux esophagitis, epistaxis, elevated liver enzymes, osteoporosis, spondylolisthesis, large intestine benign polyp who presents to therapy today with chief complaints of chronic low back pain and leg pain kim.  Pt sx began worsening since September 2017.  She has had 2 injections with some relief, and has tried PT ad [place] with no relief.  On exam pt demonstrates significant pain and peripheral sx with spine extension, negative SLUMP and SLR, some hypomobility in her hips L>R, significant weakness on MedX testing today.  Pt would benefit from skilled PT to address the above limitations.   X-ray in Epic on 2/7/18: approximately 15 mm anterolisthesis of L4 with respect to L5 which is very similar in both flexion and extension. Severe interspace   narrowing is seen at L4-L5 and L5-S1 with associated endplate sclerosis. More mild  posterior interspace narrowing at L3-L4. Vertebral body heights are preserved.    Goals:  Pt. will demonstrate/verbalize independence in self-management of condition in : 4 weeks  Pt. will be independent with home exercise program in : 4 weeks    Pt will: report decreased sx by 50% in 4 weeks  Pt will: reach overhead without increased pain in 4 weeks  Pt will: improve her MedX strength by 30# on average in 4 weeks  Pt will: perform stairs without difficulty in 4 weeks          Plan / Patient Instructions:      Plan for next visit: Trial MedX DE, I would hold rotary torso until pt is tolerating other exercises well, possibility of increasing seat cushion on MedX.  Abdominal strengthening, piriformis stretches as able, significant focus on neutral spine and stability.     Subjective:        Feeling a little sore.  Her legs feel better.  She was sore after MedX but is willing to continue to try.    Pt reports she feels like she cannot move on the MedX machine due to the seat.  We will try to increase seat cushion next time.    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     Exercises:  Exercise #1: Nustep warm up level 5  Comment #1: rotary torso, smallest notch, 20# 10 reps each direction  Exercise #2: supine LTR, supine SKTC, Supine SLR slider, Supine pretzel stretch for hip rotation    Lumbar MedX (Keep conservative!!)  Enter Week / Visit #: Wk 1 V 2  Weight (lbs): 70  Reps (#): 20  Time: 171  ROM (degrees): 0-42  Pain: Sore after pushing, temporary  Flex:Ext ratio: 5.33:1             Treatment Today     TREATMENT MINUTES COMMENTS   Evaluation     Self-care/ Home management     Manual therapy     Neuromuscular Re-education     Therapeutic Activity     Therapeutic Exercises 30 See  above. Patient reports she felt better after medX today, back pain was gone, but left leg still impaired.   Gait training     Modality__________________                Total 30    Blank areas are intentional and mean the treatment did not include these items.              Sol Brennan, DPT  1/14/2019

## 2021-06-24 NOTE — PROGRESS NOTES
FOOT AND ANKLE SURGERY/PODIATRY CONSULT NOTE         ASSESSMENT:   Tyloma left foot, onychomycosis, onychauxis right great toenail      TREATMENT:  Debrided the right great toenail.  Debrided the hyperkeratotic lesion left foot.        HPI: Stacey Allen is a 73-year-old female who presented to the clinic today complaining of a painful callus on the bottom of her left foot near the fourth toe.  The patient stated that this is quite painful with weightbearing and ambulation.  She denies trauma to the toe.  She has not had any redness or swelling.  The pain is relieved with nonweightbearing.  She denies any other previous treatment.  The patient also complained of a very thick toenail involving her right great toe.  The patient stated that she can no longer treat the toenail.  She has not had any redness or swelling surrounding the toe. .     Past Medical History:   Diagnosis Date     Hyperlipidemia      Hypertension      Osteoporosis      Stomach ulcer        Past Surgical History:   Procedure Laterality Date     [place]  DELIVERY ONLY      Description:  Section;  Recorded: 2011;     [place] EXPLORATORY OF ABDOMEN      Description: Exploratory Laparotomy;  Recorded: 2011;     [place] REMOVE TONSILS/ADENOIDS,<11 Y/O      Description: Tonsillectomy With Adenoidectomy;  Recorded: 2011;       No Known Allergies      Current Outpatient Medications:      amLODIPine (NORVASC) 5 MG tablet, Take 1 tablet (5 mg total) by mouth daily., Disp: 90 tablet, Rfl: 3     lisinopril-hydrochlorothiazide (ZESTORETIC) 20-25 mg per tablet, Take 1 tablet by mouth daily. In the AM for high Blood Pressure, Disp: 90 tablet, Rfl: 3     MULTIVIT-MINERALS/FERROUS FUM (MULTI VITAMIN ORAL), Take 1 tablet by mouth daily., Disp: , Rfl:      naproxen sodium (ALEVE) 220 MG tablet, Take 1-2 tablets by mouth every 6 (six) hours as needed., Disp: , Rfl:      pantoprazole (PROTONIX) 40 MG tablet, Take 40 mg by mouth daily., Disp: , Rfl:      Family History   Problem Relation Age of Onset     Colon cancer Mother 74     Diabetes Mother      Cancer Sister 62        Lung     Aneurysm Father        Social History     Socioeconomic History     Marital status: Single     Spouse name: Not on file     Number of children: Not on file     Years of education: Not on file     Highest education level: Not on file   Occupational History     Not on file   Social Needs     Financial resource strain: Not on file     Food insecurity:     Worry: Not on file     Inability: Not on file     Transportation needs:     Medical: Not on file     Non-medical: Not on file   Tobacco Use     Smoking status: Former Smoker     Last attempt to quit: 1980     Years since quittin.6     Smokeless tobacco: Never Used   Substance and Sexual Activity     Alcohol use: Yes     Comment: social     Drug use: No     Sexual activity: Not on file   Lifestyle     Physical activity:     Days per week: Not on file     Minutes per session: Not on file     Stress: Not on file   Relationships     Social connections:     Talks on phone: Not on file     Gets together: Not on file     Attends Quaker service: Not on file     Active member of club or organization: Not on file     Attends meetings of clubs or organizations: Not on file     Relationship status: Not on file     Intimate partner violence:     Fear of current or ex partner: Not on file     Emotionally abused: Not on file     Physically abused: Not on file     Forced sexual activity: Not on file   Other Topics Concern     Not on file   Social History Narrative     Not on file       Review of Systems - Patient denies fever, chills, rash, wound, stiffness, limping, numbness, weakness, heart burn, blood in stool, chest pain with activity, calf pain when walking, shortness of breath with activity, chronic cough, easy bleeding/bruising, swelling of ankles, excessive thirst, fatigue, depression, anxiety.  Patient admits to painful callus  left foot and thick right great toenail.      OBJECTIVE:  Appearance: alert, well appearing, and in no distress.    Vitals:    02/28/19 1543   BP: 90/60   Pulse: 88   Resp: 20   Temp: 97.9  F (36.6  C)       BMI= Body mass index is 32.12 kg/m .    General appearance: Patient is alert and fully cooperative with history & exam.  No sign of distress is noted during the visit.  Psychiatric: Affect is pleasant & appropriate.  Patient appears motivated to improve health.  Respiratory: Breathing is regular & unlabored while sitting.  HEENT: Hearing is intact to spoken word.  Speech is clear.  No gross evidence of visual impairment that would impact ambulation.    Vascular: Dorsalis pedis and posterior tibial pulses are palpable. There is pedal hair growth bilaterally.  CFT < 3 sec from anterior tibial surface to distal digits bilaterally. There is no appreciable edema noted.  Dermatologic: Right great toenail is 2-3 times normal thickness with subungual debris present.  There is a thick hyperkeratotic lesion on the plantar aspect of the fourth toe left foot which is painful on palpation.  There is no edema or erythema noted.  No bleeding noted.  Turgor and texture are within normal limits. No coloration or temperature changes.  Neurologic: All epicritic and proprioceptive sensations are grossly intact bilaterally.  Musculoskeletal: All active and passive ankle, subtalar, midtarsal, and 1st MPJ range of motion are grossly intact without pain or crepitus, with the exception of none. Manual muscle strength is +5/5 bilaterally in all compartments. All dorsiflexors, plantarflexors, invertors, evertors are intact bilaterally. Tenderness present to hyperkeratotic lesion left foot on palpation.  No tenderness to bilateral feet or ankles with range of motion. Calf is soft and non-tender without warmth or induration    Imaging:     No results found.       TREATMENT:  I debrided the thick hyperkeratotic lesion on the bottom of the  left foot and also debrided the right great toenail.  I recommended the patient return to the clinic as needed.    Bowen Coffey; KWABENA  [facility] Foot & Ankle Surgery/Podiatry

## 2021-06-25 NOTE — PROGRESS NOTES
Progress Notes by Maria D Verde MD at 10/23/2017 12:40 PM     Author: Maria D Verde MD Service: -- Author Type: Physician    Filed: 10/24/2017 12:24 PM Encounter Date: 10/23/2017 Status: Signed    : Maria D Verde MD (Physician)         Assessment and Plan:   Stacey was seen today for medicare annual wellness visit subsequent and immunizations.    Diagnoses and all orders for this visit:    Routine general medical examination at a health care facility  -     Pneumococcal conjugate vaccine 13-valent 6wks-17yrs; >50yrs    Osteopenia  -     Vitamin D, Total (25-Hydroxy)  -     Magnesium, Red Blood Cell  -     DXA Bone Density Scan; Future    Hypercholesterolemia  -     Lipid Cascade    Chronic Reflux Esophagitis - She is willing to try and wean off PPI. See my recommendations below.    Sciatica of left side  -     Ambulatory referral to Physical Therapy    Insomnia - Handout on tips for better sleep.  Try slow release melatonin.    Elevated glucose  -     Comprehensive Metabolic Panel  -     Glycosylated Hemoglobin A1c    Encounter for screening mammogram for breast cancer  -     Mammo Screening Bilateral; Future         There are no diagnoses linked to this encounter.    The patient's current medical problems were reviewed.      The following health maintenance schedule was reviewed with the patient and provided in printed form in the after visit summary:   Health Maintenance   Topic Date Due   ? ADVANCE DIRECTIVES DISCUSSED WITH PATIENT  05/27/1963   ? DXA SCAN  05/27/2012   ? PNEUMOCOCCAL CONJUGATE VACCINE FOR ADULTS (PCV13 OR PREVNAR)  05/27/2012   ? FALL RISK ASSESSMENT  05/27/2012   ? MAMMOGRAM  10/21/2018   ? TD 18+ HE  09/09/2021   ? COLONOSCOPY  07/08/2023   ? PNEUMOCOCCAL POLYSACCHARIDE VACCINE AGE 65 AND OVER  Completed   ? INFLUENZA VACCINE RULE BASED  Completed   ? ZOSTER VACCINE  Completed        Subjective:   Chief Complaint: Stacey Allen is an 72 y.o. female  here for an Annual Wellness visit.  She has multiple other complaints she wishes to address:    HPI:  1) She has a pain in the left hip/ buttocks that radiates down posterior left thigh but not below knee.  Sometimes radiates to right side.  She takes Aleve in am when she gets up and it helps.  Leg gets tingly at times.  Symptoms do not go below the knee. Tries stretching.  If on feet a lot, pain will come back in afternoon and she will take Tylenol.  No weakness in leg.  Left leg less flexible.  No bowel or bladder problems.  Same intensity for 6 month.  Denies any initial injury or trauma.  She has had sciatica in the past.    2) Insomnia - She wakes up 3-4 x during the night.  No obvious reason.  She has bad dreams.  Goes to bed 8-9 am gets up 5:30 pm.  Has glass wine 2-3 nights a week.  No meds.  No anxiety. She does snore.  She feels she has post nasal drip.  Room is cool and dark. She does watch TV before bedtime.  She will get up 2x to urinate on certain nights.  Caffeine- 3 cps in am.  None after noon.    3) Hepatitis C - No history of elevated liver enzymes.  No high risk behavior. She would like to be tested given her age.    4) She has dry skin. Topical lotions do not help.  She does not feel her hydration is good.    Review of Systems:   Please see above.  The rest of the review of systems are negative for all systems.    Patient Care Team:  Maria D Verde MD as PCP - General     Patient Active Problem List   Diagnosis   ? Hypercholesterolemia   ? Osteopenia   ? Decrease In Height   ? Chronic Reflux Esophagitis   ? Epistaxis   ? Benign Polyps Of The Large Intestine   ? Bartholin Gland Cyst     No past medical history on file.   Past Surgical History:   Procedure Laterality Date   ? [place]  DELIVERY ONLY      Description:  Section;  Recorded: 2011;   ? [place] EXPLORATORY OF ABDOMEN      Description: Exploratory Laparotomy;  Recorded: 2011;   ? [place] REMOVE TONSILS/ADENOIDS,<12  "Y/O      Description: Tonsillectomy With Adenoidectomy;  Recorded: 09/11/2011;      Family History   Problem Relation Age of Onset   ? Colon cancer Mother 74   ? Cancer Sister 62     Lung      Social History     Social History   ? Marital status: Single     Spouse name: N/A   ? Number of children: N/A   ? Years of education: N/A     Occupational History   ? Not on file.     Social History Main Topics   ? Smoking status: Former Smoker     Quit date: 8/1/1980   ? Smokeless tobacco: Never Used   ? Alcohol use Yes   ? Drug use: No   ? Sexual activity: Not on file     Other Topics Concern   ? Not on file     Social History Narrative      Current Outpatient Prescriptions   Medication Sig Dispense Refill   ? MULTIVIT-MINERALS/FERROUS FUM (MULTI VITAMIN ORAL) Take 1 tablet by mouth daily.     ? pantoprazole (PROTONIX) 40 MG tablet Take 40 mg by mouth daily.     ? fluticasone (FLONASE) 50 mcg/actuation nasal spray 2 sprays in each nostril BID for 1-2 weeks 18 g 0     No current facility-administered medications for this visit.       Objective:   Vital Signs:   Visit Vitals   ? /80 (Patient Site: Right Arm, Patient Position: Sitting, Cuff Size: Adult Regular)   ? Pulse 64   ? Resp 12   ? Ht 5' 5\" (1.651 m)   ? Wt 211 lb (95.7 kg)   ? LMP 07/08/1992   ? Breastfeeding No   ? BMI 35.11 kg/m2        VisionScreening:  No exam data present     PHYSICAL EXAM  /80 (Patient Site: Right Arm, Patient Position: Sitting, Cuff Size: Adult Regular)  Pulse 64  Resp 12  Ht 5' 5\" (1.651 m)  Wt 211 lb (95.7 kg)  LMP 07/08/1992  Breastfeeding? No  BMI 35.11 kg/m2   No acute distress.  HEENT: Head is atraumatic and/normocephalic.  PERRL.  Conjunctiva clear.  Tympanic membranes grey with normal landmarks and normal light reflexes.  No nasal discharge.  Oropharynx is pink and moist.  Sinuses nontender.  Neck: Supple.  No lymphadenopathy or thyromegaly.  Lungs: Clear to auscultation.  No wheezing, retractions, or " tachypnea.  Heart: RRR. S1 and S2 normal.  No murmurs, rubs, or gallops.  Breasts: No lumps/ masses / axillary lymphadenopathy.  Back: No spinal tenderness. Tender in left sciatic notch.  Her straight leg-raise is negative bilaterally.  Neuro: Awake, alert, oriented x 3.  Normal strength and tone.  Her lower extremities show normal DTRs. Normal gait.   PSYCH:  Awake, alert, and oriented x 3.  Mood and affect are appropriate.  Good eye contact.  Speech is normal.  No suicidal ideation.  No hallucinations.      Assessment Results 10/23/2017   Activities of Daily Living No help needed   Instrumental Activities of Daily Living No help needed   Some recent data might be hidden     A Mini-Cog score of 0-2 suggests the possibility of dementia, score of 3-5 suggests no dementia  BIMS testing: 15/15.    Identified Health Risks:     She is at risk for lack of exercise and has been provided with information to increase physical activity for the benefit of her well-being.    Patient Instructions         Exercise for a Healthier Heart  You may wonder how you can improve the health of your heart. If youre thinking about exercise, youre on the right track. You dont need to become an athlete, but you do need a certain amount of brisk exercise to help strengthen your heart. If you have been diagnosed with a heart condition, your doctor may recommend exercise to help stabilize your condition. To help make exercise a habit, choose safe, fun activities.       Be sure to check with your health care provider before starting an exercise program.    Why exercise?  Exercising regularly offers many healthy rewards. It can help you do all of the following:    Improve your blood cholesterol levels to help prevent further heart trouble    Lower your blood pressure to help prevent a stroke or heart attack    Control diabetes, or reduce your risk of getting this disease    Improve your heart and lung function    Reach and maintain a healthy  weight    Make your muscles stronger and more limber so you can stay active    Prevent falls and fractures by slowing the loss of bone mass (osteoporosis)    Manage stress better  Exercise tips  Ease into your routine. Set small goals. Then build on them.  Exercise on most days. Aim for a total of 150 or more minutes of moderate to  vigorous intensity activity each week. Consider 40 minutes, 3 to 4 times a week. For best results, activity should last for 40 minutes on average. It is OK to work up to the 40 minute period over time. Examples of moderate-intensity activity is walking one mile in 15 minutes or 30 to 45 minutes of yard work.  Step up your daily activity level. Along with your exercise program, try being more active throughout the day. Walk instead of drive. Do more household tasks or yard work.  Choose one or more activities you enjoy. Walking is one of the easiest things you can do. You can also try swimming, riding a bike, or taking an exercise class.  Stop exercising and call your doctor if you:    Have chest pain or feel dizzy or lightheaded    Feel burning, tightness, pressure, or heaviness in your chest, neck, shoulders, back, or arms    Have unusual shortness of breath    Have increased joint or muscle pain    Have palpitations or an irregular heartbeat      1576-0666 [employer]. [address] [place] [address], [place] [address]. All rights reserved. This information is not intended as a substitute for professional medical care. Always follow your healthcare professional's instructions.             Advance Directive  Patients advance directive was discussed and I am comfortable with the patients wishes.    Health Maintenance   Topic Date Due   ? ADVANCE DIRECTIVES DISCUSSED WITH PATIENT  05/27/1963   ? DXA SCAN  05/27/2012   ? PNEUMOCOCCAL CONJUGATE VACCINE FOR ADULTS (PCV13 OR PREVNAR)  05/27/2012   ? FALL RISK ASSESSMENT  05/27/2012   ? MAMMOGRAM  10/21/2018   ? TD 18+ HE  09/09/2021   ?  COLONOSCOPY  07/08/2023   ? PNEUMOCOCCAL POLYSACCHARIDE VACCINE AGE 65 AND OVER  Completed   ? INFLUENZA VACCINE RULE BASED  Completed   ? ZOSTER VACCINE  Completed     Try taking Protonix every other day for a couple of weeks.    You can also take DGL supplements for symptoms of acid reflux - this is over the counter and can be found in health food stores.    To order supplements go to the web site: [employer]  Use my authorization number to order the recommended supplements: [id number]    Slow release melatonin - Take one 1 hour before bedtime.    [place] gum/ DGL - Chew 1-2 tabs as needed    Maria D Verde

## 2021-06-28 NOTE — PROGRESS NOTES
Progress Notes by Weston Vargas MD at 3/11/2020  8:20 AM     Author: Weston Vargas MD Service: -- Author Type: Physician    Filed: 3/18/2020  2:43 PM Encounter Date: 3/11/2020 Status: Signed    : Weston Vargas MD (Physician)           [facility] Vein Consult      Assessment:     1. varicose veins, bilateral   2. spider veins, bilateral     Plan:     1. Treatment options of conservative therapy of stockings use, exercise, weight loss, elevating legs when possible.    2. Script for compression stockings 20-30 mm hg  3. Ultrasound to evaluate legs for incompetency of both deep and superficial system .   4. Surgical treatment   Laser treatment of bilateral  spider veins   Discussed sclerotherapy also of spider veins   Risks and benefits of surgical intervention including infection, burns, dvt, thrombophlebitis, not closing, recurrence, numbness and nerve injury and need for further intervention were all discused    5. Follow up: as needed.   6. Call for any questions concerns or issues    Subjective:      Stacey Allen is a 74 y.o. female  who was referred by Maria D Verde MD  for evaluation of varicose veins. Symptoms include pain, aching, fatigue, burning and dermatitis. Patient has history of leg selling, pain and vein issues that have progressed. Pain and symptoms have affected daily living and work activities needing medications. Here for evaluation today. Stocking use with compression stockings of 20-30 mm hg or greater for greater then 3 months    Allergies:Patient has no known allergies.    Past Medical History:   Diagnosis Date   ? Hyperlipidemia    ? Hypertension    ? Osteoporosis    ? Stomach ulcer        Past Surgical History:   Procedure Laterality Date   ? AK  DELIVERY ONLY      Description:  Section;  Recorded: 2011;   ? AK EXPLORATORY OF ABDOMEN      Description: Exploratory Laparotomy;  Recorded: 2011;   ? AK REMOVE  "TONSILS/ADENOIDS,<11 Y/O      Description: Tonsillectomy With Adenoidectomy;  Recorded: 09/11/2011;       Current Outpatient Medications   Medication Sig Note   ? acetaminophen (TYLENOL) 500 MG tablet Take 1,000 mg by mouth.    ? amLODIPine (NORVASC) 5 MG tablet Take 1 tablet (5 mg total) by mouth daily.    ? lisinopril-hydrochlorothiazide (PRINZIDE,ZESTORETIC) 20-25 mg per tablet TAKE 1 TABLET BY MOUTH DAILY IN THE AM FOR HIGH BLOOD PRESSURE    ? multivitamin with minerals (THERA-M) 9 mg iron-400 mcg Tab tablet Take 1 tablet by mouth daily.    ? pantoprazole (PROTONIX) 40 MG tablet Take 40 mg by mouth daily. 10/21/2015: Received from: External Pharmacy Received Sig:        Family History   Problem Relation Age of Onset   ? Colon cancer Mother 74   ? Diabetes Mother    ? Cancer Sister 62        Lung   ? Aneurysm Father         reports that she quit smoking about 39 years ago. She has never used smokeless tobacco. She reports current alcohol use. She reports that she does not use drugs.      Review of Systems:  Pertinent items are noted in HPI.  A 12 point comprehensive review of systems was negative except as noted.   Patient has symptomatic veins and changes of bilateral legs. These have progressed to the point of causing symptoms on a daily basis. This causes issues with daily activities and chores such as washing dishes, vacuuming, outdoor upkeep and standing for long lengths of time       Objective:     Vitals:    03/11/20 0828   BP: 154/70   Pulse: 84   Resp: 20   Temp: 98.3  F (36.8  C)   Weight: 201 lb 1.6 oz (91.2 kg)   Height: 5' 6\" (1.676 m)     Body mass index is 32.46 kg/m .    EXAM:  GENERAL: This is a well-developed 74 y.o. female who appears her stated age  HEAD: normocephalic  HEENT: Pupils equal and reactive bilaterally  MOUTH: mucus membranes intact. Normal dentation  CARDIAC: RRR without murmur  CHEST/LUNG:  Clear to auscultation bilaterally  ABDOMEN: Soft, nontender, nondistended, no masses noted "   NEUROLOGIC: Focally intact, nonfocal, alert and oriented x 3  INTEGUMENT: No open lesions or ulcers  VASCULAR: Pulses intact, symmetrical upper and lower extremities. There areskin changes consistent with chronic venous insufficiency. Varicose veins present in bilateral greater saphenous distribution. Spider veins present bilateral.                    Imaging:    US Venous Insufficiency Legs Bilateral (Order [id number])   Imaging   Date: 3/11/2020  Department: [facility] Ultrasound [place]  Released By: Primo Adler, AMANDA, RVT  Authorizing: Weston Vargas MD    Study Result     [facility] RADIOLOGY  LOCATION: [facility]  DATE: 3/11/2020     EXAM: BILATERAL LOWER EXTREMITY DEEP AND SUPERFICIAL VENOUS DUPLEX ULTRASOUND WITH PHYSIOLOGIC TESTING     INDICATION: Symptomatic varicose veins. Assess for incompetent veins.     TECHNIQUE: Supine and upright ultrasound of the deep and superficial veins with Valsalva and compression augmentation maneuvers. Duplex imaging is performed utilizing gray-scale, two-dimensional images, color-flow imaging, Doppler waveform analysis, and   spectral Doppler imaging.      INCOMPETENCY CRITERIA: Deep vein reflux reported when greater than 1,000 ms flow reversal.  Superficial vein reflux reported when greater than 600 ms flow reversal.  vein reflux reported as greater than 350 ms flow reversal.     DEEP VEIN FINDINGS:     RIGHT LEG: The common femoral, profunda femoral, femoral, popliteal, and visualized calf veins are patent and compressible.     LEFT LEG:  The common femoral, profunda femoral, femoral, popliteal, and visualized calf veins are patent and compressible.      RIGHT SUPERFICIAL VEIN FINDINGS:  GREAT SAPHENOUS VEIN: Competent from the saphenofemoral junction to the mid calf.     SMALL SAPHENOUS VEIN: Competent from the saphenopopliteal junction to the mid calf.     No incompetent perforating veins or abnormal  accessory veins identified.     LEFT SUPERFICIAL VEIN FINDINGS:  GREAT SAPHENOUS VEIN: Competent from the saphenofemoral junction to the mid calf.     SMALL SAPHENOUS VEIN: Competent from the saphenopopliteal junction to the mid calf.     No incompetent perforating veins or abnormal accessory veins identified.     IMPRESSION:   CONCLUSION:   1.  No deep venous thrombosis of either lower extremity.  2.  RIGHT LEG: The right superficial venous system is patent, without incompetence.  3.  LEFT LEG: The left superficial venous system is patent, without incompetence.         Weston Vargas MD  General Surgery [phone]  Vascular Surgery [phone]

## 2021-07-03 NOTE — ADDENDUM NOTE
Addendum Note by Roula Moreland MD at 3/2/2020  3:20 PM     Author: Roula Moreland MD Service: -- Author Type: Physician    Filed: 3/3/2020  8:25 AM Encounter Date: 3/2/2020 Status: Signed    : Roula Moreland MD (Physician)    Addended by: ROULA MORELAND on: 3/3/2020 08:25 AM        Modules accepted: Level of Service

## 2021-07-03 NOTE — ADDENDUM NOTE
Addendum Note by Grey Beal CMA at 2/7/2018  1:25 PM     Author: Grey Beal CMA Service: -- Author Type: Certified Medical Assistant    Filed: 2/7/2018  1:25 PM Date of Service: 2/7/2018  1:25 PM Status: Signed    : Grey Beal CMA (Certified Medical Assistant)    Encounter addended by: Grey Beal CMA on: 2/7/2018  1:25 PM<BR>     Actions taken: Vitals modified

## 2021-07-03 NOTE — ADDENDUM NOTE
Addendum Note by Maria D Gomez MD at 3/23/2020  1:30 PM     Author: Maria D Gomez MD Service: -- Author Type: Physician    Filed: 4/7/2020 11:14 AM Encounter Date: 3/23/2020 Status: Signed    : Maria D Gomez MD (Physician)    Addended by: MARIA D GOMEZ on: 4/7/2020 11:14 AM        Modules accepted: Level of Service

## 2021-07-11 ENCOUNTER — HEALTH MAINTENANCE LETTER (OUTPATIENT)
Age: 76
End: 2021-07-11

## 2021-07-12 ENCOUNTER — OFFICE VISIT (OUTPATIENT)
Dept: FAMILY MEDICINE | Facility: CLINIC | Age: 76
End: 2021-07-12

## 2021-07-12 VITALS
WEIGHT: 220.7 LBS | BODY MASS INDEX: 36.73 KG/M2 | HEART RATE: 83 BPM | DIASTOLIC BLOOD PRESSURE: 92 MMHG | SYSTOLIC BLOOD PRESSURE: 152 MMHG | OXYGEN SATURATION: 98 %

## 2021-07-12 DIAGNOSIS — I10 ESSENTIAL HYPERTENSION: ICD-10-CM

## 2021-07-12 DIAGNOSIS — R60.0 LOCALIZED EDEMA: Primary | ICD-10-CM

## 2021-07-12 PROCEDURE — 99213 OFFICE O/P EST LOW 20 MIN: CPT | Performed by: FAMILY MEDICINE

## 2021-07-12 RX ORDER — HYDROCHLOROTHIAZIDE 25 MG/1
25 TABLET ORAL DAILY
Qty: 90 TABLET | Refills: 1 | Status: SHIPPED | OUTPATIENT
Start: 2021-07-12 | End: 2021-09-22

## 2021-07-12 RX ORDER — FUROSEMIDE 20 MG
20 TABLET ORAL DAILY
Qty: 3 TABLET | Refills: 0 | Status: SHIPPED | OUTPATIENT
Start: 2021-07-12 | End: 2022-04-30

## 2021-07-12 NOTE — PATIENT INSTRUCTIONS
Nice to see you today Yesi    Likely  this is increased swelling just from lack of a water pill    Use external compression    Continue valsartan 160 mg daily for blood pressure control  Add hydrochlorothiazide 25 mg 1 daily as a blood pressure medication as well as water pill  Add furosemide 20 mg 1 daily for 3 days as an extra kick for water pill    Give me a MyChart message in 7 days    Goal blood pressure less than 140 and the top number less than 85 and the bottom number

## 2021-07-12 NOTE — PROGRESS NOTES
ASSESSMENT & PLAN    No problem-specific Assessment & Plan notes found for this encounter.      Stacey was seen today for hypertension and leg swelling.    Diagnoses and all orders for this visit:    Localized edema  -     furosemide (LASIX) 20 MG tablet; Take 1 tablet (20 mg) by mouth daily For 3 days    Essential hypertension  -     hydrochlorothiazide (HYDRODIURIL) 25 MG tablet; Take 1 tablet (25 mg) by mouth daily        There are no Patient Instructions on file for this visit.    Return in about 1 week (around 7/19/2021) for To check in by Jeremiah how things are responding.       [unfilled]    CHIEF COMPLAINT: Stacey Allen had concerns including Hypertension (high readings ) and Leg Swelling (BOTH LEGS).    [place]: 1.............. SUBJECTIVE:  Stacey Allen is a 76 year old female had concerns including Hypertension (high readings ) and Leg Swelling (BOTH LEGS).    1. Localized edema    2. Essential hypertension      Since stopping lisinopril hydrochlorothiazide blood pressure is elevated  Swelling in the feet  Cough is better  We also did do Medrol along with Symbicort this is been discontinued    She was on 2025 lisinopril hydrochlorothiazide        No Known Allergies                      SOCIAL: She  reports that she quit smoking about 40 years ago. She has never used smokeless tobacco. She reports current alcohol use. She reports that she does not use drugs.    REVIEW OF SYSTEMS:   Family history not pertinent to chief complaint or presenting problem    Review of systems otherwise negative as requested from patient, except   Those positive ROS outlined and discussed in [place].      VITALS:  Vitals:    07/12/21 1143   BP: (!) 152/92   Pulse: 83   SpO2: 98%   Weight: 100.1 kg (220 lb 11.2 oz)     Wt Readings from Last 3 Encounters:   07/12/21 100.1 kg (220 lb 11.2 oz)   04/30/21 97.1 kg (214 lb)   10/20/20 96.6 kg (213 lb)     Body mass index is 36.73 kg/m .    Physical Exam:  Bilateral edema pitting 1+ ankles  shins  Lungs clear no crackles  Cardiac regular rate and rhythm no appreciable murmur         I spent 20  minutes with this patient.  This includes pre-visit, intra-visit and post visit work an evaluation with regards to Stacey was seen today for hypertension and leg swelling.    Diagnoses and all orders for this visit:    Localized edema  -     furosemide (LASIX) 20 MG tablet; Take 1 tablet (20 mg) by mouth daily For 3 days    Essential hypertension  -     hydrochlorothiazide (HYDRODIURIL) 25 MG tablet; Take 1 tablet (25 mg) by mouth daily        Ronnie Overton MD  Family Medicine   [facility] [id number] [phone]

## 2021-07-21 ENCOUNTER — RECORDS - HEALTHEAST (OUTPATIENT)
Dept: ADMINISTRATIVE | Facility: CLINIC | Age: 76
End: 2021-07-21

## 2021-07-22 ENCOUNTER — RECORDS - HEALTHEAST (OUTPATIENT)
Dept: FAMILY MEDICINE | Facility: CLINIC | Age: 76
End: 2021-07-22

## 2021-07-22 DIAGNOSIS — Z12.31 OTHER SCREENING MAMMOGRAM: ICD-10-CM

## 2021-07-23 DIAGNOSIS — R60.0 LOCALIZED EDEMA: Primary | ICD-10-CM

## 2021-07-23 RX ORDER — FUROSEMIDE 40 MG
40 TABLET ORAL DAILY
Qty: 30 TABLET | Refills: 1 | Status: SHIPPED | OUTPATIENT
Start: 2021-07-23 | End: 2022-04-30

## 2021-09-05 ENCOUNTER — HEALTH MAINTENANCE LETTER (OUTPATIENT)
Age: 76
End: 2021-09-05

## 2021-09-22 ENCOUNTER — TRANSFERRED RECORDS (OUTPATIENT)
Dept: HEALTH INFORMATION MANAGEMENT | Facility: CLINIC | Age: 76
End: 2021-09-22

## 2021-09-22 ENCOUNTER — MYC MEDICAL ADVICE (OUTPATIENT)
Dept: FAMILY MEDICINE | Facility: CLINIC | Age: 76
End: 2021-09-22

## 2021-09-22 DIAGNOSIS — I10 ESSENTIAL HYPERTENSION: ICD-10-CM

## 2021-09-24 RX ORDER — VALSARTAN 160 MG/1
160 TABLET ORAL DAILY
Qty: 90 TABLET | Refills: 3 | Status: SHIPPED | OUTPATIENT
Start: 2021-09-24 | End: 2022-10-03

## 2021-09-24 RX ORDER — HYDROCHLOROTHIAZIDE 25 MG/1
25 TABLET ORAL DAILY
Qty: 90 TABLET | Refills: 3 | Status: SHIPPED | OUTPATIENT
Start: 2021-09-24 | End: 2022-03-10

## 2021-10-12 ENCOUNTER — TRANSFERRED RECORDS (OUTPATIENT)
Dept: HEALTH INFORMATION MANAGEMENT | Facility: CLINIC | Age: 76
End: 2021-10-12

## 2021-10-19 ENCOUNTER — TRANSFERRED RECORDS (OUTPATIENT)
Dept: HEALTH INFORMATION MANAGEMENT | Facility: CLINIC | Age: 76
End: 2021-10-19
Payer: MEDICARE

## 2022-03-09 DIAGNOSIS — I10 ESSENTIAL HYPERTENSION: ICD-10-CM

## 2022-03-09 NOTE — TELEPHONE ENCOUNTER
Please call pt - I have not seen her in almost 2 years.  Please see who she plans to follow for her primary.

## 2022-03-10 RX ORDER — HYDROCHLOROTHIAZIDE 25 MG/1
25 TABLET ORAL DAILY
Qty: 90 TABLET | Refills: 3 | Status: SHIPPED | OUTPATIENT
Start: 2022-03-10 | End: 2022-12-30

## 2022-03-10 NOTE — TELEPHONE ENCOUNTER
Pending Prescriptions:                       Disp   Refills    hydrochlorothiazide (HYDRODIURIL) 25 MG t*90 tab*3            Sig: Take 1 tablet (25 mg) by mouth daily    Patient states she now Dr. Overton's patient.  Her last office visit with Dr. Overton was 7/12/21.

## 2022-04-26 ENCOUNTER — OFFICE VISIT (OUTPATIENT)
Dept: FAMILY MEDICINE | Facility: CLINIC | Age: 77
End: 2022-04-26
Payer: MEDICARE

## 2022-04-26 VITALS
BODY MASS INDEX: 34.45 KG/M2 | SYSTOLIC BLOOD PRESSURE: 108 MMHG | HEART RATE: 84 BPM | DIASTOLIC BLOOD PRESSURE: 66 MMHG | WEIGHT: 207 LBS | OXYGEN SATURATION: 99 %

## 2022-04-26 DIAGNOSIS — M81.0 SENILE OSTEOPOROSIS: ICD-10-CM

## 2022-04-26 DIAGNOSIS — M54.6 PAIN IN THORACIC SPINE: ICD-10-CM

## 2022-04-26 DIAGNOSIS — M40.00 KYPHOSIS (ACQUIRED) (POSTURAL): ICD-10-CM

## 2022-04-26 DIAGNOSIS — Z92.29 PERSONAL HISTORY OF OTHER DRUG THERAPY: ICD-10-CM

## 2022-04-26 DIAGNOSIS — S29.012D UPPER BACK STRAIN, SUBSEQUENT ENCOUNTER: Primary | ICD-10-CM

## 2022-04-26 DIAGNOSIS — Z13.220 SCREENING FOR HYPERLIPIDEMIA: ICD-10-CM

## 2022-04-26 DIAGNOSIS — I10 ESSENTIAL HYPERTENSION: ICD-10-CM

## 2022-04-26 DIAGNOSIS — Z12.31 VISIT FOR SCREENING MAMMOGRAM: ICD-10-CM

## 2022-04-26 LAB
ALBUMIN SERPL-MCNC: 3.9 G/DL (ref 3.5–5)
ALP SERPL-CCNC: 77 U/L (ref 45–120)
ALT SERPL W P-5'-P-CCNC: 21 U/L (ref 0–45)
ANION GAP SERPL CALCULATED.3IONS-SCNC: 13 MMOL/L (ref 5–18)
AST SERPL W P-5'-P-CCNC: 20 U/L (ref 0–40)
BILIRUB SERPL-MCNC: 0.8 MG/DL (ref 0–1)
BUN SERPL-MCNC: 38 MG/DL (ref 8–28)
CALCIUM SERPL-MCNC: 9.1 MG/DL (ref 8.5–10.5)
CHLORIDE BLD-SCNC: 107 MMOL/L (ref 98–107)
CO2 SERPL-SCNC: 24 MMOL/L (ref 22–31)
CREAT SERPL-MCNC: 1.18 MG/DL (ref 0.6–1.1)
ERYTHROCYTE [DISTWIDTH] IN BLOOD BY AUTOMATED COUNT: 12.9 % (ref 10–15)
ERYTHROCYTE [SEDIMENTATION RATE] IN BLOOD BY WESTERGREN METHOD: 20 MM/HR (ref 0–20)
GFR SERPL CREATININE-BSD FRML MDRD: 48 ML/MIN/1.73M2
GLUCOSE BLD-MCNC: 112 MG/DL (ref 70–125)
HCT VFR BLD AUTO: 38.5 % (ref 35–47)
HGB BLD-MCNC: 11.9 G/DL (ref 11.7–15.7)
MCH RBC QN AUTO: 29.8 PG (ref 26.5–33)
MCHC RBC AUTO-ENTMCNC: 30.9 G/DL (ref 31.5–36.5)
MCV RBC AUTO: 96 FL (ref 78–100)
PLATELET # BLD AUTO: 247 10E3/UL (ref 150–450)
POTASSIUM BLD-SCNC: 3.8 MMOL/L (ref 3.5–5)
PROT SERPL-MCNC: 7.3 G/DL (ref 6–8)
RBC # BLD AUTO: 4 10E6/UL (ref 3.8–5.2)
SODIUM SERPL-SCNC: 144 MMOL/L (ref 136–145)
WBC # BLD AUTO: 7.4 10E3/UL (ref 4–11)

## 2022-04-26 PROCEDURE — 85027 COMPLETE CBC AUTOMATED: CPT | Mod: QW | Performed by: FAMILY MEDICINE

## 2022-04-26 PROCEDURE — 85652 RBC SED RATE AUTOMATED: CPT | Mod: QW | Performed by: FAMILY MEDICINE

## 2022-04-26 PROCEDURE — 91305 COVID-19,PF,PFIZER (12+ YRS): CPT | Performed by: FAMILY MEDICINE

## 2022-04-26 PROCEDURE — 0054A COVID-19,PF,PFIZER (12+ YRS): CPT | Performed by: FAMILY MEDICINE

## 2022-04-26 PROCEDURE — 99213 OFFICE O/P EST LOW 20 MIN: CPT | Performed by: FAMILY MEDICINE

## 2022-04-26 PROCEDURE — 80053 COMPREHEN METABOLIC PANEL: CPT | Mod: QW | Performed by: FAMILY MEDICINE

## 2022-04-26 PROCEDURE — 36415 COLL VENOUS BLD VENIPUNCTURE: CPT | Performed by: FAMILY MEDICINE

## 2022-04-26 RX ORDER — TRAMADOL HYDROCHLORIDE 50 MG/1
50 TABLET ORAL EVERY 6 HOURS PRN
Qty: 12 TABLET | Refills: 0 | Status: SHIPPED | OUTPATIENT
Start: 2022-04-26 | End: 2022-04-29

## 2022-04-26 NOTE — PROGRESS NOTES
"Answers for HPI/ROS submitted by the patient on 4/19/2022  Do you check your blood pressure regularly outside of the clinic?: No  Are your blood pressures ever more than 140 on the top number (systolic) OR more than 90 on the bottom number (diastolic)? (For example, greater than 140/90): Yes  Are you following a low salt diet?: No  Your back pain is: chronic  Where is your back pain located? : right upper back, right side of neck  How would you describe your back pain? : dull ache  Where does your back pain spread? : right side of neck  Since you noticed your back pain, how has it changed? : always present, but gets better and worse  Does your back pain interfere with your job?: Yes  If yes, which:: acetaminophen (Tylenol), heat, NSAIDS (Ibuprofen, Naproxen), Physical Therapy, topical pain relievers  How many servings of fruits and vegetables do you eat daily?: 2-3  On average, how many sweetened beverages do you drink each day (Examples: soda, juice, sweet tea, etc.  Do NOT count diet or artificially sweetened beverages)?: 0  How many days a week do you exercise enough to make your heart beat faster?: 3 or less  How many days per week do you miss taking your medication?: 0  What is the reason for your visit today?: pain  When did your symptoms begin?: More than a month  What are your symptoms?: upper back pain when upright  How would you describe these symptoms?: Severe  Are your symptoms:: Staying the same  Have you had these symptoms before?: No  Is there anything that makes you feel worse?: being upright  Is there anything that makes you feel better?: laying down        ASSESSMENT & PLAN    Upper back strain, subsequent encounter  [facility] >> PT        Facet joint Frequency Ablation \"did not work\"    \"keeps me from doing things\"    Worse with standing   Gone laying down   Radiates to neck   \"achy\"   Muscle Ache         Stacey was seen today for back pain and follow up.    Diagnoses and all orders for this " visit:    Upper back strain, subsequent encounter    Screening for hyperlipidemia    Visit for screening mammogram  -     MA SCREENING DIGITAL BILAT - Future  (s+30); Future    Senile osteoporosis  -     denosumab (PROLIA) injection 60 mg    Personal history of other drug therapy  -     denosumab (PROLIA) injection 60 mg    Essential hypertension  -     Comprehensive metabolic panel (BMP + Alb, Alk Phos, ALT, AST, Total. Bili, TP); Future  -     ESR: Erythrocyte sedimentation rate; Future  -     CBC with platelets; Future    Pain in thoracic spine  -     XR Thoracic Spine 2 Views; Future  -     ESR: Erythrocyte sedimentation rate; Future  -     CBC with platelets; Future  -     traMADol (ULTRAM) 50 MG tablet; Take 1 tablet (50 mg) by mouth every 6 hours as needed for severe pain    Kyphosis (acquired) (postural)  -     XR Thoracic Spine 2 Views; Future  -     ESR: Erythrocyte sedimentation rate; Future  -     CBC with platelets; Future  -     traMADol (ULTRAM) 50 MG tablet; Take 1 tablet (50 mg) by mouth every 6 hours as needed for severe pain    Other orders  -     REVIEW OF HEALTH MAINTENANCE PROTOCOL ORDERS  -     COVID-19,PF,PFIZER (12+ Yrs GRAY LABEL)        Patient Instructions   Thanks for coming in today Stacey    I would like to have an x-ray of your thoracic spine to exclude an apparent compression fracture    I would like you to try tramadol 50 mg  This can be taken with acetaminophen 500 mg  Take 1 daily in the morning and see if it affects your ability to think straight  Take 1 in the afternoon as needed  Take 1 in the evening    If this is helpful and makes pain tolerable I can represcribe for a longer course as this is chronic pain in your thoracic spine    If your x-ray is unrevealing I would recommend doing an MRI of your spine    I will wait for the official read off of your thoracic spine x-ray    You will receive your COVID booster today    Have also put in an order for Prolia and apparently this  will have to be prior authorized and you can have your Prolia injection here once this is done          No follow-ups on file.       PATIENT HEALTH QUESTIONNAIRE-9 (PHQ - 9)    Over the last 2 weeks, how often have you been bothered by any of the following problems?    1. Little interest or pleasure in doing things -      2. Feeling down, depressed, or hopeless -      3. Trouble falling or staying asleep, or sleeping too much -     4. Feeling tired or having little energy -      5. Poor appetite or overeating -      6. Feeling bad about yourself - or that you are a failure or have let yourself or your family down -      7. Trouble concentrating on things, such as reading the newspaper or watching television -     8. Moving or speaking so slowly that other people could have noticed? Or the opposite - being so fidgety or restless that you have been moving around a lot more than usual     9. Thoughts that you would be better off dead or of hurting  yourself in some way     Total Score:       If you checked off any problems, how difficult have these problems made it for you to do your work, take care of things at home, or get along with other people?      Developed by [employer], Christy Rodriguez, Contreras Manuel and colleagues, with an educational navarro from [employer] No permission required to reproduce, translate, display or distribute. permission required to reproduce, translate, display or distribute.    CHIEF COMPLAINT: Stacey Allen had concerns including Back Pain (Was seen at [place] for it,) and Follow Up (Needs to get back on her prolia (Dr Mix order it)).    [place]: 1.............. SUBJECTIVE:  Stacey Allen is a 76 year old female had concerns including Back Pain (Was seen at [place] for it,) and Follow Up (Needs to get back on her prolia (Dr Mix order it)).    1. Upper back strain, subsequent encounter    2. Screening for hyperlipidemia    3. Visit for screening mammogram    4. Senile  osteoporosis    5. Personal history of other drug therapy    6. Essential hypertension    7. Pain in thoracic spine    8. Kyphosis (acquired) (postural)          No Known Allergies                      SOCIAL: She  reports that she quit smoking about 41 years ago. She has never used smokeless tobacco. She reports current alcohol use. She reports that she does not use drugs.    REVIEW OF SYSTEMS:   Family history not pertinent to chief complaint or presenting problem    Review of systems otherwise negative as requested from patient, except   Those positive ROS outlined and discussed in [place].      VITALS:  Vitals:    04/26/22 1541   BP: 108/66   Pulse: 84   SpO2: 99%   Weight: 93.9 kg (207 lb)     Wt Readings from Last 3 Encounters:   04/26/22 93.9 kg (207 lb)   07/12/21 100.1 kg (220 lb 11.2 oz)   04/30/21 97.1 kg (214 lb)     Body mass index is 34.45 kg/m .    Physical Exam:  Tenderness to palpation across the thoracic spine areas marked with pen  Marked kyphosis  Lungs are clear  Lab work  Cardiac no murmur  Paraspinous muscles tender rhomboids as well as erector spinae muscle       I spent 20 minutes with this patient.  This includes pre-visit, intra-visit and post visit work an evaluation with regards to Stacey was seen today for back pain and follow up.    Diagnoses and all orders for this visit:    Upper back strain, subsequent encounter    Screening for hyperlipidemia    Visit for screening mammogram  -     MA SCREENING DIGITAL BILAT - Future  (s+30); Future    Senile osteoporosis  -     denosumab (PROLIA) injection 60 mg    Personal history of other drug therapy  -     denosumab (PROLIA) injection 60 mg    Essential hypertension  -     Comprehensive metabolic panel (BMP + Alb, Alk Phos, ALT, AST, Total. Bili, TP); Future  -     ESR: Erythrocyte sedimentation rate; Future  -     CBC with platelets; Future    Pain in thoracic spine  -     XR Thoracic Spine 2 Views; Future  -     ESR: Erythrocyte sedimentation  rate; Future  -     CBC with platelets; Future  -     traMADol (ULTRAM) 50 MG tablet; Take 1 tablet (50 mg) by mouth every 6 hours as needed for severe pain    Kyphosis (acquired) (postural)  -     XR Thoracic Spine 2 Views; Future  -     ESR: Erythrocyte sedimentation rate; Future  -     CBC with platelets; Future  -     traMADol (ULTRAM) 50 MG tablet; Take 1 tablet (50 mg) by mouth every 6 hours as needed for severe pain    Other orders  -     REVIEW OF HEALTH MAINTENANCE PROTOCOL ORDERS  -     COVID-19,PF,PFIZER (12+ Yrs GRAY LABEL)        Ronnie Overton MD  [facility] [address] [phone]

## 2022-04-26 NOTE — ASSESSMENT & PLAN NOTE
"[facility] >> PT        Facet joint Frequency Ablation \"did not work\"    \"keeps me from doing things\"    Worse with standing   Gone laying down   Radiates to neck   \"achy\"   Muscle Ache     "

## 2022-04-26 NOTE — PATIENT INSTRUCTIONS
Thanks for coming in today Stacey    I would like to have an x-ray of your thoracic spine to exclude an apparent compression fracture    I would like you to try tramadol 50 mg  This can be taken with acetaminophen 500 mg  Take 1 daily in the morning and see if it affects your ability to think straight  Take 1 in the afternoon as needed  Take 1 in the evening    If this is helpful and makes pain tolerable I can represcribe for a longer course as this is chronic pain in your thoracic spine    If your x-ray is unrevealing I would recommend doing an MRI of your spine    I will wait for the official read off of your thoracic spine x-ray    You will receive your COVID booster today    Have also put in an order for Prolia and apparently this will have to be prior authorized and you can have your Prolia injection here once this is done

## 2022-04-28 ENCOUNTER — ANCILLARY PROCEDURE (OUTPATIENT)
Dept: GENERAL RADIOLOGY | Facility: CLINIC | Age: 77
End: 2022-04-28
Attending: FAMILY MEDICINE
Payer: MEDICARE

## 2022-04-28 DIAGNOSIS — M40.00 KYPHOSIS (ACQUIRED) (POSTURAL): ICD-10-CM

## 2022-04-28 DIAGNOSIS — M54.6 PAIN IN THORACIC SPINE: ICD-10-CM

## 2022-04-28 PROCEDURE — 72070 X-RAY EXAM THORAC SPINE 2VWS: CPT | Mod: TC | Performed by: RADIOLOGY

## 2022-05-09 DIAGNOSIS — M40.00 KYPHOSIS (ACQUIRED) (POSTURAL): Primary | ICD-10-CM

## 2022-05-09 DIAGNOSIS — M54.6 PAIN IN THORACIC SPINE: ICD-10-CM

## 2022-05-09 RX ORDER — TRAMADOL HYDROCHLORIDE 50 MG/1
50 TABLET ORAL EVERY 12 HOURS PRN
Qty: 28 TABLET | Refills: 0 | Status: SHIPPED | OUTPATIENT
Start: 2022-05-09 | End: 2022-05-23

## 2022-05-10 ENCOUNTER — ALLIED HEALTH/NURSE VISIT (OUTPATIENT)
Dept: FAMILY MEDICINE | Facility: CLINIC | Age: 77
End: 2022-05-10
Payer: MEDICARE

## 2022-05-10 DIAGNOSIS — M81.0 SENILE OSTEOPOROSIS: Primary | ICD-10-CM

## 2022-05-10 PROCEDURE — 96372 THER/PROPH/DIAG INJ SC/IM: CPT | Performed by: FAMILY MEDICINE

## 2022-05-10 PROCEDURE — 99207 PR NO CHARGE NURSE ONLY: CPT

## 2022-05-10 NOTE — PROGRESS NOTES
"Prolia Injection Phone Screen      Screening questions have been asked 2-3 days prior to administration visit for Prolia. If any questions are answered with \"Yes,\" this phone encounter were will routed to ordering provider for further evaluation.     1.  When was the last injection?  2/26/21    2.  Has insurance for this injection been verified?  Yes    3.  Did you experience any new onset achiness or rashes that lasted for over a month with your previous Prolia injection?   No    4.  Do you have a fever over 101?F or a new deep cough that is unusual for you today? No    5.  Have you started any new medications in the last 6 months that you were told could affect your immune system? These may have been prescribed by oncologist, transplant, rheumatology, or dermatology.   No    6.  In the last 6 months have you have gastric bypass or parathyroid surgery?   No    7.  Do you plan dental work requiring drilling into the bone such as implants/extractions or oral surgery in the next 2-3 months?   No    8. Do you have new insurance since the last injection?    9. Have you received the Covid-19 vaccine? Yes  If No - Proceed with Prolia injection  If Yes - Date of vaccination 4/26/22. Will need to wait until 2 weeks after 2nd dose of Covid-19 vaccine before administering Prolia       Patient informed if symptoms discussed above present prior to their administration appointment, they are to notify clinic immediately.     Katina Dunham"

## 2022-06-08 ENCOUNTER — MYC MEDICAL ADVICE (OUTPATIENT)
Dept: FAMILY MEDICINE | Facility: CLINIC | Age: 77
End: 2022-06-08
Payer: MEDICARE

## 2022-06-08 DIAGNOSIS — M54.6 PAIN IN THORACIC SPINE: Primary | ICD-10-CM

## 2022-06-08 RX ORDER — TRAMADOL HYDROCHLORIDE 50 MG/1
50 TABLET ORAL EVERY 12 HOURS PRN
Qty: 28 TABLET | Refills: 0 | Status: SHIPPED | OUTPATIENT
Start: 2022-06-08 | End: 2022-06-11

## 2022-06-09 NOTE — TELEPHONE ENCOUNTER
Last prescription:    traMADol (ULTRAM) 50 MG tablet 28 tablet 0 5/9/2022 5/23/2022 No   Sig - Route: Take 1 tablet (50 mg) by mouth every 12 hours as needed for severe pain - Oral   Sent to pharmacy as: traMADol HCl 50 MG Oral Tablet (ULTRAM)   Class: E-Prescribe   Order: [id number]   E-Prescribing Status: Receipt confirmed by pharmacy (5/9/2022  6:19 PM CDT)     Fill Date ID   Written Drug Qty Days Prescriber Rx # Pharmacy Refill   Daily Dose* Pymt Type      05/09/2022  1   05/09/2022  Tramadol Hcl 50 MG Tablet    28.00  14 Da Lar   [id number]   [facility] ([id number])   0/0  10.00 MME  [place]   04/26/2022  1   04/26/2022  Tramadol Hcl 50 MG Tablet    12.00  3 Da Lar   [id number]   [facility] ([id number])   0/0  20.00 MME  [place]       Patient instructions fro mvisit 4/27/22 with Dr. Overton:    Patient Instructions   Thanks for coming in today Stacey     I would like to have an x-ray of your thoracic spine to exclude an apparent compression fracture    I would like you to try tramadol 50 mg  This can be taken with acetaminophen 500 mg  Take 1 daily in the morning and see if it affects your ability to think straight  Take 1 in the afternoon as needed  Take 1 in the evening    If this is helpful and makes pain tolerable I can represcribe for a longer course as this is chronic pain in your thoracic spine

## 2022-06-13 ENCOUNTER — TRANSFERRED RECORDS (OUTPATIENT)
Dept: HEALTH INFORMATION MANAGEMENT | Facility: CLINIC | Age: 77
End: 2022-06-13
Payer: MEDICARE

## 2022-06-23 ENCOUNTER — TRANSFERRED RECORDS (OUTPATIENT)
Dept: HEALTH INFORMATION MANAGEMENT | Facility: CLINIC | Age: 77
End: 2022-06-23

## 2022-07-21 ENCOUNTER — TRANSFERRED RECORDS (OUTPATIENT)
Dept: HEALTH INFORMATION MANAGEMENT | Facility: CLINIC | Age: 77
End: 2022-07-21

## 2022-08-07 ENCOUNTER — HEALTH MAINTENANCE LETTER (OUTPATIENT)
Age: 77
End: 2022-08-07

## 2022-10-03 ENCOUNTER — MYC MEDICAL ADVICE (OUTPATIENT)
Dept: FAMILY MEDICINE | Facility: CLINIC | Age: 77
End: 2022-10-03

## 2022-10-03 DIAGNOSIS — I10 ESSENTIAL HYPERTENSION: ICD-10-CM

## 2022-10-04 RX ORDER — VALSARTAN 160 MG/1
160 TABLET ORAL DAILY
Qty: 30 TABLET | Refills: 1 | Status: SHIPPED | OUTPATIENT
Start: 2022-10-04 | End: 2022-12-30

## 2022-10-23 ENCOUNTER — HEALTH MAINTENANCE LETTER (OUTPATIENT)
Age: 77
End: 2022-10-23

## 2022-10-28 DIAGNOSIS — I10 ESSENTIAL HYPERTENSION: ICD-10-CM

## 2022-10-29 RX ORDER — VALSARTAN 160 MG/1
160 TABLET ORAL DAILY
Qty: 30 TABLET | Refills: 1 | OUTPATIENT
Start: 2022-10-29

## 2022-12-19 ENCOUNTER — OFFICE VISIT (OUTPATIENT)
Dept: INTERNAL MEDICINE | Facility: CLINIC | Age: 77
End: 2022-12-19
Payer: MEDICARE

## 2022-12-19 VITALS
TEMPERATURE: 98.2 F | WEIGHT: 200 LBS | HEART RATE: 91 BPM | SYSTOLIC BLOOD PRESSURE: 110 MMHG | DIASTOLIC BLOOD PRESSURE: 64 MMHG | OXYGEN SATURATION: 97 % | HEIGHT: 65 IN | BODY MASS INDEX: 33.32 KG/M2

## 2022-12-19 DIAGNOSIS — E78.5 HYPERLIPIDEMIA LDL GOAL <130: ICD-10-CM

## 2022-12-19 DIAGNOSIS — M81.0 AGE-RELATED OSTEOPOROSIS WITHOUT CURRENT PATHOLOGICAL FRACTURE: Primary | ICD-10-CM

## 2022-12-19 DIAGNOSIS — Z23 ENCOUNTER FOR IMMUNIZATION: ICD-10-CM

## 2022-12-19 DIAGNOSIS — Z86.19 HISTORY OF HELICOBACTER PYLORI INFECTION: ICD-10-CM

## 2022-12-19 DIAGNOSIS — Z87.11 HISTORY OF GASTRIC ULCER: ICD-10-CM

## 2022-12-19 DIAGNOSIS — M54.2 CERVICALGIA: ICD-10-CM

## 2022-12-19 DIAGNOSIS — K21.9 GASTROESOPHAGEAL REFLUX DISEASE WITHOUT ESOPHAGITIS: ICD-10-CM

## 2022-12-19 PROBLEM — M79.671 PAIN IN BOTH FEET: Status: RESOLVED | Noted: 2020-07-14 | Resolved: 2022-12-19

## 2022-12-19 PROBLEM — M79.672 PAIN IN BOTH FEET: Status: RESOLVED | Noted: 2020-07-14 | Resolved: 2022-12-19

## 2022-12-19 PROBLEM — E66.01 MORBID OBESITY (H): Status: RESOLVED | Noted: 2020-10-20 | Resolved: 2022-12-19

## 2022-12-19 PROBLEM — M54.50 LOW BACK PAIN: Status: RESOLVED | Noted: 2019-08-23 | Resolved: 2022-12-19

## 2022-12-19 PROCEDURE — 90715 TDAP VACCINE 7 YRS/> IM: CPT | Performed by: INTERNAL MEDICINE

## 2022-12-19 PROCEDURE — 99215 OFFICE O/P EST HI 40 MIN: CPT | Mod: 25 | Performed by: INTERNAL MEDICINE

## 2022-12-19 PROCEDURE — 90471 IMMUNIZATION ADMIN: CPT | Performed by: INTERNAL MEDICINE

## 2022-12-19 RX ORDER — TRAMADOL HYDROCHLORIDE 50 MG/1
50 TABLET ORAL EVERY 6 HOURS PRN
Qty: 60 TABLET | Refills: 0 | Status: SHIPPED | OUTPATIENT
Start: 2022-12-19 | End: 2022-12-22

## 2022-12-19 RX ORDER — GABAPENTIN 100 MG/1
100 CAPSULE ORAL 3 TIMES DAILY
Qty: 60 CAPSULE | Refills: 3 | Status: SHIPPED | OUTPATIENT
Start: 2022-12-19 | End: 2024-01-02

## 2022-12-19 RX ORDER — CELECOXIB 100 MG/1
100 CAPSULE ORAL 2 TIMES DAILY
COMMUNITY
Start: 2022-11-11 | End: 2024-01-02

## 2022-12-19 ASSESSMENT — PATIENT HEALTH QUESTIONNAIRE - PHQ9
10. IF YOU CHECKED OFF ANY PROBLEMS, HOW DIFFICULT HAVE THESE PROBLEMS MADE IT FOR YOU TO DO YOUR WORK, TAKE CARE OF THINGS AT HOME, OR GET ALONG WITH OTHER PEOPLE: NOT DIFFICULT AT ALL
SUM OF ALL RESPONSES TO PHQ QUESTIONS 1-9: 4
SUM OF ALL RESPONSES TO PHQ QUESTIONS 1-9: 4

## 2022-12-19 NOTE — PROGRESS NOTES
ASSESSMENT AND PLAN:    1. Age-related osteoporosis without current pathological fracture  She wants to restart denosumab.    - denosumab (PROLIA) injection 60 mg    2. 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  Has found use of low dose tramadol has been helpful. She has had evaluation at [facility] with EMG and imaging and corticosteroid injection without benefit. It may be that postural muscle weakness plays a role in the positional and thus mechanical aspect.   - traMADol (ULTRAM) 50 MG tablet; Take 1 tablet (50 mg) by mouth every 6 hours as needed for severe pain (7-10)  Dispense: 60 tablet; Refill: 0    3. Hyperlipidemia LDL goal <130  High LDL levels in 2017.  Need to repeat testing.     4. Encounter for immunization  - TDAP VACCINE (Adacel, Boostrix)    5. History of gastric ulcer    6. History of Helicobacter pylori infection    7. Gastroesophageal reflux disease without esophagitis  Ongoing PPI therapy    8. Screening for colon cancer  7/2018 colonoscopy - tubular adenoma polyps - due in 7/2023    CHIEF COMPLAINT:  Shoulder injury and establish care    Total time:  45 minutes reviewing past records, interviewing and examining the patient and completing documentation.      HISTORY OF PRESENT ILLNESS:  Stacey Allen is a 77 year old female with 2 years of pain in the right lower neck, upper back, scapular area.  Aching in quality, and somewhat dysesthetic.  This came on about 2 years ago after she had lumbar fusion for spinal stenosis.  No arm pain or numbness or weakness.  Imaging and EMG have been negative, and epidural injections have not been beneficial.  Mainly hurts when up, gets better when lying down and at night.  No trauma.     REVIEW OF SYSTEMS:   See HPI, all other systems on review are negative.    Past Medical History:   Diagnosis Date     Cervicalgia 12/19/2022     Essential hypertension      Gastroesophageal  "reflux disease without esophagitis      History of gastric ulcer      History of Helicobacter pylori infection      Hyperlipidemia LDL goal <130 2022     Osteoporosis      History   Smoking Status     Former     Quit date: 1980   Smokeless Tobacco     Never     Family History   Problem Relation Age of Onset     Colon Cancer Mother 74.00     Diabetes Mother      Cancer Sister 62.00        Lung     Aneurysm Father      Past Surgical History:   Procedure Laterality Date     AS LAP,FULGURATE/EXCISE LESIONS        SECTION       LUMBAR FUSION       MYOMECTOMY UTERUS       TONSILLECTOMY & ADENOIDECTOMY       VITALS:  Vitals:    22 1351   BP: 110/64   BP Location: Left arm   Patient Position: Sitting   Cuff Size: Adult Regular   Pulse: 91   Temp: 98.2  F (36.8  C)   SpO2: 97%   Weight: 90.7 kg (200 lb)   Height: 1.651 m (5' 5\")     Wt Readings from Last 3 Encounters:   22 90.7 kg (200 lb)   22 93.9 kg (207 lb)   21 100.1 kg (220 lb 11.2 oz)     PHYSICAL EXAM:  Constitutional:  In NAD, alert and oriented  Neck: no cervical or axillary adenopathy, some medial scapular border pain area, mildly tender, minimal spasm, neck has good ROM without pain, and right shoulder ROM is normal.    Cardiac:  S1 S2   Lungs: Clear  Neurologic:  Bilateral brisk UE reflexes, no UE motor weakness      DECISION TO OBTAIN OLD RECORDS AND/OR OBTAIN HISTORY FROM SOMEONE OTHER THAN PATIENT, AND/OR ACCESSING CARE EVERYWHERE):  1  Reviewed outside [facility] evaluations.      REVIEW AND SUMMARIZATION OF OLD RECORDS, AND/OR OBTAINING HISTORY FROM SOMEONE OTHER THAN PATIENT, AND/OR DISCUSSION OF CASE WITH ANOTHER HEALTH CARE PROVIDER:  2 reviewed recent health evaluations.     REVIEW AND/OR ORDER OF OF CLINICAL LAB TESTS: 1  Reviewed    REVIEW AND/OR ORDER OF RADIOLOGY TESTS: 1 reviewed cervical imaging.    REVIEW AND/OR ORDER OF MEDICAL TESTS (EKG/ECHO/COLONOSCOPY/EGD): 1  Reviewed colonoscopy    INDEPENDENT  " VISUALIZATION OF IMAGE, TRACING, OR SPECIMEN ITSELF (2 EACH):  0    TOTAL:6    Current Outpatient Medications   Medication Sig Dispense Refill     acetaminophen (TYLENOL) 500 MG tablet [ACETAMINOPHEN (TYLENOL) 500 MG TABLET] Take 1,000 mg by mouth.       Calcium Carb-Cholecalciferol (OYSTER SHELL CALCIUM) 500-400 MG-UNIT TABS Take 1 tablet by mouth       celecoxib (CELEBREX) 100 MG capsule Take 100 mg by mouth 2 times daily       hydrochlorothiazide (HYDRODIURIL) 25 MG tablet Take 1 tablet (25 mg) by mouth daily 90 tablet 3     multivitamin with minerals (THERA-M) 9 mg iron-400 mcg Tab tablet [MULTIVITAMIN WITH MINERALS (THERA-M) 9 MG IRON-400 MCG TAB TABLET] Take 1 tablet by mouth daily.       pantoprazole (PROTONIX) 40 MG tablet [PANTOPRAZOLE (PROTONIX) 40 MG TABLET] Take 40 mg by mouth daily.       traMADol (ULTRAM) 50 MG tablet Take 1 tablet (50 mg) by mouth every 6 hours as needed for severe pain (7-10) 60 tablet 0     valsartan (DIOVAN) 160 MG tablet Take 1 tablet (160 mg) by mouth daily 30 tablet 1     Jesus Liao MD  Internal Medicine  [facility]

## 2022-12-26 ENCOUNTER — MYC MEDICAL ADVICE (OUTPATIENT)
Dept: INTERNAL MEDICINE | Facility: CLINIC | Age: 77
End: 2022-12-26

## 2022-12-26 DIAGNOSIS — I10 ESSENTIAL HYPERTENSION: ICD-10-CM

## 2022-12-29 ENCOUNTER — MYC MEDICAL ADVICE (OUTPATIENT)
Dept: INTERNAL MEDICINE | Facility: CLINIC | Age: 77
End: 2022-12-29

## 2022-12-29 DIAGNOSIS — I10 ESSENTIAL HYPERTENSION: ICD-10-CM

## 2022-12-29 RX ORDER — HYDROCHLOROTHIAZIDE 25 MG/1
25 TABLET ORAL DAILY
Qty: 90 TABLET | Refills: 3 | Status: CANCELLED | OUTPATIENT
Start: 2022-12-29

## 2022-12-30 RX ORDER — HYDROCHLOROTHIAZIDE 25 MG/1
25 TABLET ORAL DAILY
Qty: 90 TABLET | Refills: 0 | Status: SHIPPED | OUTPATIENT
Start: 2022-12-30 | End: 2022-12-30

## 2022-12-30 RX ORDER — HYDROCHLOROTHIAZIDE 25 MG/1
25 TABLET ORAL DAILY
Qty: 90 TABLET | Refills: 0 | Status: SHIPPED | OUTPATIENT
Start: 2022-12-30 | End: 2023-03-24

## 2022-12-30 RX ORDER — VALSARTAN 160 MG/1
160 TABLET ORAL DAILY
Qty: 90 TABLET | Refills: 0 | Status: SHIPPED | OUTPATIENT
Start: 2022-12-30 | End: 2023-03-13

## 2022-12-30 NOTE — TELEPHONE ENCOUNTER
refills signed,   Looks like kidney function was worse in august.  Should  labs repeated. Since Dr Weller saw him recently, would let him put orders in.

## 2022-12-30 NOTE — TELEPHONE ENCOUNTER
Request to transfer prescription to mail order pharmacy.   Last filled 3/10/2022 disp 90 refills 3  Sending remaining refill to requesting pharmacy.    Cecy Chen RN   12/30/22 10:50 AM  [facility] Nurse Advisor

## 2023-01-05 ENCOUNTER — MYC MEDICAL ADVICE (OUTPATIENT)
Dept: INTERNAL MEDICINE | Facility: CLINIC | Age: 78
End: 2023-01-05

## 2023-01-05 DIAGNOSIS — M81.0 SENILE OSTEOPOROSIS: Primary | ICD-10-CM

## 2023-01-05 DIAGNOSIS — Z92.29 PERSONAL HISTORY OF DRUG THERAPY: ICD-10-CM

## 2023-01-12 ENCOUNTER — HOSPITAL ENCOUNTER (OUTPATIENT)
Dept: PHYSICAL THERAPY | Facility: REHABILITATION | Age: 78
Discharge: HOME | End: 2023-01-12
Attending: INTERNAL MEDICINE
Payer: COMMERCIAL

## 2023-01-12 ENCOUNTER — TRANSFERRED RECORDS (OUTPATIENT)
Dept: HEALTH INFORMATION MANAGEMENT | Facility: CLINIC | Age: 78
End: 2023-01-12

## 2023-01-12 DIAGNOSIS — M54.2 CERVICALGIA: ICD-10-CM

## 2023-01-12 DIAGNOSIS — G89.29 CHRONIC RIGHT-SIDED THORACIC BACK PAIN: Primary | ICD-10-CM

## 2023-01-12 DIAGNOSIS — M54.6 CHRONIC RIGHT-SIDED THORACIC BACK PAIN: Primary | ICD-10-CM

## 2023-01-12 PROCEDURE — 97161 PT EVAL LOW COMPLEX 20 MIN: CPT | Mod: GP | Performed by: PHYSICAL THERAPIST

## 2023-01-12 PROCEDURE — 97110 THERAPEUTIC EXERCISES: CPT | Mod: GP | Performed by: PHYSICAL THERAPIST

## 2023-01-12 NOTE — PROGRESS NOTES
[facility]    OUTPATIENT PHYSICAL THERAPY ORTHOPEDIC EVALUATION  PLAN OF TREATMENT FOR OUTPATIENT REHABILITATION  (COMPLETE FOR INITIAL CLAIMS ONLY)  Patient's Last Name, First Name, M.I.  YOB: 1945  Stacey Allen    Provider s Name:  [facility]   Medical Record No.  [id number]   Start of Care Date:  01/12/23   Onset Date:  12/19/22 (2020 fall)   Type:     _X__PT   ___OT   ___SLP Medical Diagnosis:  (P) M54.2 (ICD-10-CM) - Cervicalgia     PT Diagnosis:  (P) thoracic spine pain, cervical spine pain   Visits from SOC:  1      _________________________________________________________________________________  Plan of Treatment/Functional Goals:  (P) balance training, joint mobilization, manual therapy, neuromuscular re-education, ROM, strengthening, stretching (use caution with joint mobilization due to bone scan results)           Goals  Goal Identifier: standing  Goal Description: Patient will be able to stand 30-45 minutes with pain 3/10 or less symptoms.  Target Date: 03/09/23    Goal Identifier: rest  Goal Description: Patient will be able to obtain comfortable position for rest lying down within 10 minutes of changing position.  Target Date: 03/09/23    Goal Identifier: walking  Goal Description: Patient will be able to walk 2.5 blocks to park without pain greater than 2/10.  Target Date: 03/09/23    Goal Identifier: HEP  Goal Description: (P) Patient will be able to complete 6 exercises without assistance for progression to independent management.  Target Date: (P) 03/09/23                                                Therapy Frequency:  (P) 1 time/week  Predicted Duration of Therapy Intervention:  (P) 8 weeks    Nasim Freitas, PT                 I CERTIFY THE NEED FOR THESE SERVICES FURNISHED UNDER        THIS PLAN OF TREATMENT AND WHILE UNDER MY CARE      (Physician co-signature of this document indicates review and certification of the therapy plan).                     Certification Date From:  (P) 01/12/23   Certification Date To:  (P) 03/09/23    Referring Provider:  Jesus Liao MD    Initial Assessment        See Epic Evaluation Start of Care Date: 01/12/23 01/12/23 1415   General Information   Type of Visit Initial OP Ortho PT Evaluation   Start of Care Date 01/12/23   Referring Physician Jesus Liao MD   Patient/Family Goals Statement decrease back pain   Orders Evaluate and Treat   Date of Order 12/19/22   Certification Required? Yes   Medical Diagnosis M54.2 (ICD-10-CM) - Cervicalgia   Surgical/Medical history reviewed Yes  (L shoulder surgery arthroscopy, low back lumbar fusion with rods)   General Information Comments L foot numb, tingly cold all the time, R side feels tingling, numb, and full, some difficulty with balance, unsteady with walking   Body Part(s)   Body Part(s) Cervical Spine   Presentation and Etiology   Pertinent history of current problem (include personal factors and/or comorbidities that impact the POC) Patient is a 77 year old who presents with 2 year history of mid back/neck pain following lumbar spine fusion in 1/2020.  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 Hasn't gotten worse but hasn't gotten better with treatment techniques of facet joint injections, prior PT, and trigger point injections. Currently neck pain is limited standing for any length of time 5-6/10 pain, gardening, bending over (back/legs), household tasks, work tasks, walking to park.   Impairments A. Pain;D. Decreased ROM;E. Decreased flexibility;F. Decreased strength and endurance;G. Impaired balance;H. Impaired gait   Functional Limitations perform activities of daily living;perform required work activities   Symptom Location mid back to base of neck up neck    How/Where did it occur From insidious onset   Onset date of current episode/exacerbation 12/19/22  (2020 fall)   Chronicity Chronic   Pain rating (0-10 point scale) Best (/10);Worst (/10);Other   Best (/10) 0/10   Worst (/10) 9/10   Pain rating comment current 6/10   Pain quality C. Aching;B. Dull   Frequency of pain/symptoms A. Constant   Pain/symptoms are: The same all the time   Pain/symptoms exacerbated by B. Walking;A. Sitting;K. Home tasks;L. Work tasks  (standing, any upright gravity dependent)   Pain/symptoms eased by C. Rest;E. Changing positions;G. Heat   Progression of symptoms since onset: Unchanged   Current / Previous Interventions   Diagnostic Tests: X-ray;MRI   X-ray Results Results   X-ray results IMPRESSION: BB markers were placed in the pain areas correspond to T4-T6 in the right para midline location. No fracture. There is moderate kyphosis the upper thoracic spine. Normal disc spaces for age. Normal extraspinal structures.   MRI Results Results   MRI results MRI of cervical and thoracic spine done by [facility].  See Media section. Unable to see complete interpretation of results.   Bone Scan Results Results   Bone Scan results see media, unable to find a direct interpretation, it appears low bone density throughout thoracic spine however difficult to interpret   Current Level of Function   Patient role/employment history A. Employed   Employment Comments teacher, 3 day weekly   Fall Risk Screen   Fall screen completed by PT   Have you fallen 2 or more times in the past year? No   Have you fallen and had an injury in the past year? No   Is patient a fall risk? No   Fall screen comments no immediate risk of falls however patient is feeling unbalance and may benefit from formal assessment in future   Abuse Screen (yes response referral indicated)   Feels Unsafe at Home or Work/School no   Feels Threatened by Someone no   Does Anyone Try to Keep You From Having Contact with Others or Doing Things  Outside Your Home? no   Physical Signs of Abuse Present no   Functional Scales   Functional Scales Other  (17%)   Cervical Spine   Cervical Left Side Bending ROM severe limitation 50% pressure kind of pain on R, pull   Cervical Right Rotation ROM limited by 25%   Cervical Left Rotation ROM limited by 25%   Cervical Flexion ROM WFL no pain   Cervical Extension ROM limited by 25% no sx   Cervical Right Side Bending ROM severe limitation by 50% stiff   Shoulder AROM Screen limited shoulder flexion IR & ER by about 25-30%   Shoulder/Wrist/Hand Strength Comments shoulder strength in general 3+ to 4/5 flexion, abd, ext, IR & ER   Posture forward head, rounded shoulder, thoracic kyphosis   Planned Therapy Interventions   Planned Therapy Interventions balance training;joint mobilization;manual therapy;neuromuscular re-education;ROM;strengthening;stretching  (use caution with joint mobilization due to bone scan results)   Clinical Impression   Criteria for Skilled Therapeutic Interventions Met yes, treatment indicated   PT Diagnosis thoracic spine pain, cervical spine pain   Influenced by the following impairments impaired strength, decreased tissue extensibility, decreased ROM   Functional limitations due to impairments household tasks, work,   Clinical Presentation Stable/Uncomplicated   Clinical Presentation Rationale presents as expected based on chart review   Clinical Decision Making (Complexity) Low complexity   Therapy Frequency 1 time/week   Predicted Duration of Therapy Intervention (days/wks) 8 weeks   Risk & Benefits of therapy have been explained Yes   Patient, Family & other staff in agreement with plan of care Yes   Clinical Impression Comments Patient presents with symptoms consistent with strain on spine due to kyphotic posture, excessive cervical lordosis and decreased postural & GH strength. Will need consideration of hx of lumbar surgery and bone density while completing manual therapy. Patient will  benefit from skilled PT to improve mobility and stability through thoracic and cervical spine along with increase in postural and glenohumeral joint muscle balance to decrease strain on spine.   Ortho Goal 1   Goal Identifier standing   Goal Description Patient will be able to stand 30-45 minutes with pain 3/10 or less symptoms.   Goal Progress initiated   Target Date 03/09/23   Ortho Goal 2   Goal Identifier rest   Goal Description Patient will be able to obtain comfortable position for rest lying down within 10 minutes of changing position.   Goal Progress initiated   Target Date 03/09/23   Ortho Goal 3   Goal Identifier walking   Goal Description Patient will be able to walk 2.5 blocks to park without pain greater than 2/10.   Goal Progress initiated   Target Date 03/09/23   Ortho Goal 4   Goal Identifier HEP   Goal Description Patient will be able to complete 6 exercises without assistance for progression to independent management.   Goal Progress initiated   Target Date 03/09/23   Total Evaluation Time   PT Eval, Low Complexity Minutes (21153) 20   Therapy Certification   Certification date from 01/12/23   Certification date to 03/09/23   Medical Diagnosis M54.2 (ICD-10-CM) - Cervicalgia   Nasim Freitas, PT

## 2023-01-19 ENCOUNTER — HOSPITAL ENCOUNTER (OUTPATIENT)
Dept: PHYSICAL THERAPY | Facility: REHABILITATION | Age: 78
Discharge: HOME | End: 2023-01-19
Payer: COMMERCIAL

## 2023-01-19 DIAGNOSIS — M54.2 CERVICALGIA: Primary | ICD-10-CM

## 2023-01-19 DIAGNOSIS — G89.29 CHRONIC RIGHT-SIDED THORACIC BACK PAIN: ICD-10-CM

## 2023-01-19 DIAGNOSIS — M54.6 CHRONIC RIGHT-SIDED THORACIC BACK PAIN: ICD-10-CM

## 2023-01-19 PROCEDURE — 97110 THERAPEUTIC EXERCISES: CPT | Mod: GP | Performed by: PHYSICAL THERAPIST

## 2023-01-19 PROCEDURE — 97140 MANUAL THERAPY 1/> REGIONS: CPT | Mod: GP | Performed by: PHYSICAL THERAPIST

## 2023-01-26 ENCOUNTER — HOSPITAL ENCOUNTER (OUTPATIENT)
Dept: PHYSICAL THERAPY | Facility: REHABILITATION | Age: 78
Discharge: HOME | End: 2023-01-26
Payer: COMMERCIAL

## 2023-01-26 DIAGNOSIS — M54.6 CHRONIC RIGHT-SIDED THORACIC BACK PAIN: ICD-10-CM

## 2023-01-26 DIAGNOSIS — G89.29 CHRONIC RIGHT-SIDED THORACIC BACK PAIN: ICD-10-CM

## 2023-01-26 DIAGNOSIS — M54.2 CERVICALGIA: Primary | ICD-10-CM

## 2023-01-26 PROCEDURE — 97110 THERAPEUTIC EXERCISES: CPT | Mod: GP | Performed by: PHYSICAL THERAPIST

## 2023-01-26 PROCEDURE — 97140 MANUAL THERAPY 1/> REGIONS: CPT | Mod: GP | Performed by: PHYSICAL THERAPIST

## 2023-02-07 ENCOUNTER — HOSPITAL ENCOUNTER (OUTPATIENT)
Dept: PHYSICAL THERAPY | Facility: REHABILITATION | Age: 78
Discharge: HOME | End: 2023-02-07
Payer: COMMERCIAL

## 2023-02-07 DIAGNOSIS — M54.6 CHRONIC RIGHT-SIDED THORACIC BACK PAIN: ICD-10-CM

## 2023-02-07 DIAGNOSIS — G89.29 CHRONIC RIGHT-SIDED THORACIC BACK PAIN: ICD-10-CM

## 2023-02-07 DIAGNOSIS — M54.2 CERVICALGIA: Primary | ICD-10-CM

## 2023-02-07 PROCEDURE — 97140 MANUAL THERAPY 1/> REGIONS: CPT | Mod: GP | Performed by: PHYSICAL THERAPIST

## 2023-02-07 PROCEDURE — 97110 THERAPEUTIC EXERCISES: CPT | Mod: GP | Performed by: PHYSICAL THERAPIST

## 2023-02-21 ENCOUNTER — HOSPITAL ENCOUNTER (OUTPATIENT)
Dept: PHYSICAL THERAPY | Facility: REHABILITATION | Age: 78
Discharge: HOME | End: 2023-02-21
Payer: COMMERCIAL

## 2023-02-21 DIAGNOSIS — M54.6 CHRONIC RIGHT-SIDED THORACIC BACK PAIN: ICD-10-CM

## 2023-02-21 DIAGNOSIS — G89.29 CHRONIC RIGHT-SIDED THORACIC BACK PAIN: ICD-10-CM

## 2023-02-21 DIAGNOSIS — M54.2 CERVICALGIA: Primary | ICD-10-CM

## 2023-02-21 PROCEDURE — 97140 MANUAL THERAPY 1/> REGIONS: CPT | Mod: GP | Performed by: PHYSICAL THERAPIST

## 2023-02-21 PROCEDURE — 97110 THERAPEUTIC EXERCISES: CPT | Mod: GP | Performed by: PHYSICAL THERAPIST

## 2023-02-28 ENCOUNTER — HOSPITAL ENCOUNTER (OUTPATIENT)
Dept: PHYSICAL THERAPY | Facility: REHABILITATION | Age: 78
Discharge: HOME | End: 2023-02-28
Payer: COMMERCIAL

## 2023-02-28 DIAGNOSIS — G89.29 CHRONIC RIGHT-SIDED THORACIC BACK PAIN: ICD-10-CM

## 2023-02-28 DIAGNOSIS — M54.6 CHRONIC RIGHT-SIDED THORACIC BACK PAIN: ICD-10-CM

## 2023-02-28 DIAGNOSIS — M54.2 CERVICALGIA: Primary | ICD-10-CM

## 2023-02-28 PROCEDURE — 97140 MANUAL THERAPY 1/> REGIONS: CPT | Mod: GP | Performed by: PHYSICAL THERAPIST

## 2023-02-28 PROCEDURE — 97110 THERAPEUTIC EXERCISES: CPT | Mod: GP | Performed by: PHYSICAL THERAPIST

## 2023-02-28 NOTE — PROGRESS NOTES
[facility]    Outpatient Physical Therapy Discharge Note  Patient: Stacey Allen  : 1945    Beginning/End Dates of Reporting Period:  23 to 23    Referring Provider: Jesus Liao MD    Therapy Diagnosis: thoracic spine pain, cervical spine pain     Client Self Report: Pt reports no significant change since starting PT. She has a follow up with her doctor on Thursday. She gets very temporary relief with manual therapy or massage.    Objective Measurements:  Objective Measure: Palpation  Details: hypertonicity along R thoracic paraspinals and levator scapulae  Objective Measure: (P) Shoulder AROM: mild restrictions in kim flexion and abduction  Details: (P) UE strength MMT: shoulder flexion and abd 5/5 kim, elbow flex/ext: 5/5 kim of each          Goals:  Goal Identifier standing   Goal Description Patient will be able to stand 30-45 minutes with pain 3/10 or less symptoms.   Target Date 23   Date Met      Progress (detail required for progress note): no change     Goal Identifier rest   Goal Description Patient will be able to obtain comfortable position for rest lying down within 10 minutes of changing position.   Target Date 23   Date Met      Progress (detail required for progress note): typically lying down is comfortable     Goal Identifier walking   Goal Description Patient will be able to walk 2.5 blocks to park without pain greater than 2/10.   Target Date 23   Date Met      Progress (detail required for progress note): no change     Goal Identifier HEP   Goal Description Patient will be able to complete 6 exercises without assistance for progression to independent management.   Target Date 23   Date Met      Progress (detail required for progress note): doing 1-2x/day but not helping             Plan:  Discharge from therapy.    Discharge:    Reason for Discharge:  No significant change since starting PT after 6 sessions. Pt to return to PCP for further pain management options. Will continue with HEP daily.    Equipment Issued: none    Discharge Plan: Patient to continue home program.    Beverly Garcia, PT, DPT, [facility]

## 2023-03-13 DIAGNOSIS — I10 ESSENTIAL HYPERTENSION: ICD-10-CM

## 2023-03-13 RX ORDER — VALSARTAN 160 MG/1
TABLET ORAL
Qty: 90 TABLET | Refills: 3 | Status: SHIPPED | OUTPATIENT
Start: 2023-03-13

## 2023-03-13 NOTE — TELEPHONE ENCOUNTER
"Routing refill request to provider for review/approval because:  Labs out of range:  creatinine    Last Written Prescription Date:  12/30/2022  Last Fill Quantity: 90,  # refills: 0   Last office visit provider:  12/19/2022     Requested Prescriptions   Pending Prescriptions Disp Refills     valsartan (DIOVAN) 160 MG tablet [Pharmacy Med Name: VALSARTAN TABS 160MG] 90 tablet 3     Sig: TAKE 1 TABLET DAILY       Angiotensin-II Receptors Failed - 3/13/2023 12:06 AM        Failed - Normal serum creatinine on file in past 12 months     Recent Labs   Lab Test 04/26/22  1633   CR 1.18*       Ok to refill medication if creatinine is low          Passed - Last blood pressure under 140/90 in past 12 months     BP Readings from Last 3 Encounters:   12/19/22 110/64   04/26/22 108/66   07/12/21 (!) 152/92                 Passed - Recent (12 mo) or future (30 days) visit within the authorizing provider's specialty     Patient has had an office visit with the authorizing provider or a provider within the authorizing providers department within the previous 12 mos or has a future within next 30 days. See \"Patient Info\" tab in inbasket, or \"Choose Columns\" in Meds & Orders section of the refill encounter.              Passed - Medication is active on med list        Passed - Patient is age 18 or older        Passed - No active pregnancy on record        Passed - Normal serum potassium on file in past 12 months     Recent Labs   Lab Test 04/26/22  1633   POTASSIUM 3.8                    Passed - No positive pregnancy test in past 12 months             Cecy Chen RN 03/13/23 12:08 PM  "

## 2023-03-23 DIAGNOSIS — I10 ESSENTIAL HYPERTENSION: ICD-10-CM

## 2023-03-24 ENCOUNTER — LAB REQUISITION (OUTPATIENT)
Dept: LAB | Facility: CLINIC | Age: 78
End: 2023-03-24
Payer: COMMERCIAL

## 2023-03-24 DIAGNOSIS — I10 ESSENTIAL (PRIMARY) HYPERTENSION: ICD-10-CM

## 2023-03-24 LAB
ALBUMIN SERPL BCG-MCNC: 4.3 G/DL (ref 3.5–5.2)
ALP SERPL-CCNC: 75 U/L (ref 35–104)
ALT SERPL W P-5'-P-CCNC: 17 U/L (ref 10–35)
ANION GAP SERPL CALCULATED.3IONS-SCNC: 14 MMOL/L (ref 7–15)
AST SERPL W P-5'-P-CCNC: 20 U/L (ref 10–35)
BILIRUB SERPL-MCNC: 0.9 MG/DL
BUN SERPL-MCNC: 41.8 MG/DL (ref 8–23)
CALCIUM SERPL-MCNC: 9.5 MG/DL (ref 8.8–10.2)
CHLORIDE SERPL-SCNC: 103 MMOL/L (ref 98–107)
CREAT SERPL-MCNC: 1.32 MG/DL (ref 0.51–0.95)
DEPRECATED HCO3 PLAS-SCNC: 24 MMOL/L (ref 22–29)
GFR SERPL CREATININE-BSD FRML MDRD: 42 ML/MIN/1.73M2
GLUCOSE SERPL-MCNC: 86 MG/DL (ref 70–99)
POTASSIUM SERPL-SCNC: 4.9 MMOL/L (ref 3.4–5.3)
PROT SERPL-MCNC: 6.8 G/DL (ref 6.4–8.3)
SODIUM SERPL-SCNC: 141 MMOL/L (ref 136–145)

## 2023-03-24 PROCEDURE — 80053 COMPREHEN METABOLIC PANEL: CPT | Mod: ORL | Performed by: FAMILY MEDICINE

## 2023-03-24 RX ORDER — HYDROCHLOROTHIAZIDE 25 MG/1
25 TABLET ORAL DAILY
Qty: 90 TABLET | Refills: 0 | Status: SHIPPED | OUTPATIENT
Start: 2023-03-24 | End: 2023-06-24

## 2023-03-24 NOTE — TELEPHONE ENCOUNTER
"Routing refill request to provider for review/approval because:  Labs out of range:  Cr    Last Written Prescription Date:  12/30/2022  Last Fill Quantity: 90,  # refills: 0   Last office visit provider:  12/19/2022     Requested Prescriptions   Pending Prescriptions Disp Refills     hydrochlorothiazide (HYDRODIURIL) 25 MG tablet 90 tablet 0     Sig: Take 1 tablet (25 mg) by mouth daily       Diuretics (Including Combos) Protocol Failed - 3/24/2023  7:40 AM        Failed - Normal serum creatinine on file in past 12 months     Recent Labs   Lab Test 04/26/22  1633   CR 1.18*              Passed - Blood pressure under 140/90 in past 12 months     BP Readings from Last 3 Encounters:   12/19/22 110/64   04/26/22 108/66   07/12/21 (!) 152/92                 Passed - Recent (12 mo) or future (30 days) visit within the authorizing provider's specialty     Patient has had an office visit with the authorizing provider or a provider within the authorizing providers department within the previous 12 mos or has a future within next 30 days. See \"Patient Info\" tab in inbasket, or \"Choose Columns\" in Meds & Orders section of the refill encounter.              Passed - Medication is active on med list        Passed - Patient is age 18 or older        Passed - No active pregancy on record        Passed - Normal serum potassium on file in past 12 months     Recent Labs   Lab Test 04/26/22  1633   POTASSIUM 3.8                    Passed - Normal serum sodium on file in past 12 months     Recent Labs   Lab Test 04/26/22  1633                 Passed - No positive pregnancy test in past 12 months             Marlen Diaz RN 03/24/23 7:41 AM  "

## 2023-06-22 DIAGNOSIS — I10 ESSENTIAL HYPERTENSION: ICD-10-CM

## 2023-06-22 NOTE — TELEPHONE ENCOUNTER
"Routing refill request to provider for review/approval because:  Labs out of range:  creatinine    Last Written Prescription Date:  3/24/2023  Last Fill Quantity: 90,  # refills: 0   Last office visit provider:  12/19/2023     Requested Prescriptions   Pending Prescriptions Disp Refills     hydrochlorothiazide (HYDRODIURIL) 25 MG tablet 90 tablet 0     Sig: Take 1 tablet (25 mg) by mouth daily       Diuretics (Including Combos) Protocol Failed - 6/22/2023  4:26 PM        Failed - Normal serum creatinine on file in past 12 months     Recent Labs   Lab Test 03/24/23  1349   CR 1.32*              Passed - Blood pressure under 140/90 in past 12 months     BP Readings from Last 3 Encounters:   12/19/22 110/64   04/26/22 108/66   07/12/21 (!) 152/92                 Passed - Recent (12 mo) or future (30 days) visit within the authorizing provider's specialty     Patient has had an office visit with the authorizing provider or a provider within the authorizing providers department within the previous 12 mos or has a future within next 30 days. See \"Patient Info\" tab in inbasket, or \"Choose Columns\" in Meds & Orders section of the refill encounter.              Passed - Medication is active on med list        Passed - Patient is age 18 or older        Passed - No active pregancy on record        Passed - Normal serum potassium on file in past 12 months     Recent Labs   Lab Test 03/24/23  1349   POTASSIUM 4.9                    Passed - Normal serum sodium on file in past 12 months     Recent Labs   Lab Test 03/24/23  1349                 Passed - No positive pregnancy test in past 12 months             Jael Martell RN 06/22/23 5:24 PM      "

## 2023-06-24 RX ORDER — HYDROCHLOROTHIAZIDE 25 MG/1
25 TABLET ORAL DAILY
Qty: 90 TABLET | Refills: 3 | Status: SHIPPED | OUTPATIENT
Start: 2023-06-24

## 2023-08-01 ENCOUNTER — TRANSFERRED RECORDS (OUTPATIENT)
Dept: HEALTH INFORMATION MANAGEMENT | Facility: CLINIC | Age: 78
End: 2023-08-01
Payer: COMMERCIAL

## 2023-08-08 ENCOUNTER — TRANSFERRED RECORDS (OUTPATIENT)
Dept: HEALTH INFORMATION MANAGEMENT | Facility: CLINIC | Age: 78
End: 2023-08-08
Payer: COMMERCIAL

## 2023-10-03 ENCOUNTER — MEDICAL CORRESPONDENCE (OUTPATIENT)
Dept: HEALTH INFORMATION MANAGEMENT | Facility: CLINIC | Age: 78
End: 2023-10-03

## 2023-11-05 ENCOUNTER — HEALTH MAINTENANCE LETTER (OUTPATIENT)
Age: 78
End: 2023-11-05

## 2023-12-11 ENCOUNTER — LAB REQUISITION (OUTPATIENT)
Dept: LAB | Facility: CLINIC | Age: 78
End: 2023-12-11
Payer: COMMERCIAL

## 2023-12-11 DIAGNOSIS — I12.9 HYPERTENSIVE CHRONIC KIDNEY DISEASE WITH STAGE 1 THROUGH STAGE 4 CHRONIC KIDNEY DISEASE, OR UNSPECIFIED CHRONIC KIDNEY DISEASE: ICD-10-CM

## 2023-12-11 PROCEDURE — 87086 URINE CULTURE/COLONY COUNT: CPT | Mod: ORL | Performed by: FAMILY MEDICINE

## 2023-12-11 PROCEDURE — 80053 COMPREHEN METABOLIC PANEL: CPT | Mod: ORL | Performed by: FAMILY MEDICINE

## 2023-12-12 LAB
ALBUMIN SERPL BCG-MCNC: 4.5 G/DL (ref 3.5–5.2)
ALP SERPL-CCNC: 75 U/L (ref 40–150)
ALT SERPL W P-5'-P-CCNC: 14 U/L (ref 0–50)
ANION GAP SERPL CALCULATED.3IONS-SCNC: 14 MMOL/L (ref 7–15)
AST SERPL W P-5'-P-CCNC: 19 U/L (ref 0–45)
BILIRUB SERPL-MCNC: 1.1 MG/DL
BUN SERPL-MCNC: 26.3 MG/DL (ref 8–23)
CALCIUM SERPL-MCNC: 9.9 MG/DL (ref 8.8–10.2)
CHLORIDE SERPL-SCNC: 98 MMOL/L (ref 98–107)
CREAT SERPL-MCNC: 1.19 MG/DL (ref 0.51–0.95)
DEPRECATED HCO3 PLAS-SCNC: 26 MMOL/L (ref 22–29)
EGFRCR SERPLBLD CKD-EPI 2021: 47 ML/MIN/1.73M2
GLUCOSE SERPL-MCNC: 99 MG/DL (ref 70–99)
POTASSIUM SERPL-SCNC: 3.7 MMOL/L (ref 3.4–5.3)
PROT SERPL-MCNC: 7.2 G/DL (ref 6.4–8.3)
SODIUM SERPL-SCNC: 138 MMOL/L (ref 135–145)

## 2023-12-13 LAB — BACTERIA UR CULT: NORMAL

## 2024-01-02 ENCOUNTER — ANESTHESIA EVENT (OUTPATIENT)
Dept: SURGERY | Facility: CLINIC | Age: 79
End: 2024-01-02
Payer: COMMERCIAL

## 2024-01-02 RX ORDER — AMLODIPINE BESYLATE 5 MG/1
5 TABLET ORAL DAILY
COMMUNITY

## 2024-01-02 NOTE — ANESTHESIA PREPROCEDURE EVALUATION
Anesthesia Pre-Procedure Evaluation    Patient: Stacey Allen   MRN: [id number] : 1945        Procedure : Procedure(s):  Right Reverse Total Shoulder Arthroplasty          Past Medical History:   Diagnosis Date    Cervicalgia 2022    Effusion of left knee     Essential hypertension     Gastroesophageal reflux disease without esophagitis     History of colonic polyps     History of gastric ulcer     History of Helicobacter pylori infection     Hyperlipidemia LDL goal <130 2022    Leg edema     LEFT    Osteoporosis     Right shoulder pain       Past Surgical History:   Procedure Laterality Date    AS LAP,FULGURATE/EXCISE LESIONS       SECTION      LUMBAR FUSION      MYOMECTOMY UTERUS      SPINAL FUSION  2020    TONSILLECTOMY & ADENOIDECTOMY        Allergies   Allergen Reactions    Lisinopril Cough      Social History     Tobacco Use    Smoking status: Former     Types: Cigarettes     Quit date: 1980     Years since quittin.4    Smokeless tobacco: Never   Substance Use Topics    Alcohol use: Yes     Comment: Alcoholic Drinks/day: social      Wt Readings from Last 1 Encounters:   22 90.7 kg (200 lb)      HGB 13.1  EKG - SR, LAE, nl axis   Anesthesia Evaluation   Pt has had prior anesthetic.     No history of anesthetic complications       ROS/MED HX  ENT/Pulmonary:     (+)     MORGAN risk factors,  hypertension, obese,                             (-) sleep apnea and recent URI   Neurologic:     (+)    peripheral neuropathy, - peripheral neuropathy in feet.                        (-) no seizures, no CVA and migraines   Cardiovascular: Comment: LE edema managed with diuretic    (+)  hypertension- -   -  - -                                   (-) CHF, orthopnea/PND, arrhythmias and stent   METS/Exercise Tolerance:     Hematologic:  - neg hematologic  ROS     Musculoskeletal: Comment: Osteoporosis  - neg musculoskeletal ROS (+)  arthritis,             GI/Hepatic: Comment: Hx of  "gastric ulcer   Hx of polyps    (+) GERD,                   Renal/Genitourinary:     (+) renal disease, type: CRI,            Endo:  - neg endo ROS  (-) Type II DM and thyroid disease   Psychiatric/Substance Use:  - neg psychiatric ROS     Infectious Disease:  - neg infectious disease ROS     Malignancy:       Other:            Physical Exam    Airway        Mallampati: III   TM distance: > 3 FB   Neck ROM: full   Mouth opening: > 3 cm    Respiratory Devices and Support         Dental           Cardiovascular   cardiovascular exam normal       Rhythm and rate: regular and normal     Pulmonary   pulmonary exam normal        breath sounds clear to auscultation           OUTSIDE LABS:  CBC:   Lab Results   Component Value Date    WBC 7.4 04/26/2022    WBC 8.1 07/14/2020    HGB 11.9 04/26/2022    HGB 12.3 07/14/2020    HCT 38.5 04/26/2022    HCT 39.2 07/14/2020     04/26/2022     07/14/2020     BMP:   Lab Results   Component Value Date     12/11/2023     03/24/2023    POTASSIUM 3.7 12/11/2023    POTASSIUM 4.9 03/24/2023    CHLORIDE 98 12/11/2023    CHLORIDE 103 03/24/2023    CO2 26 12/11/2023    CO2 24 03/24/2023    BUN 26.3 (H) 12/11/2023    BUN 41.8 (H) 03/24/2023    CR 1.19 (H) 12/11/2023    CR 1.32 (H) 03/24/2023    GLC 99 12/11/2023    GLC 86 03/24/2023     COAGS: No results found for: \"PTT\", \"INR\", \"FIBR\"  POC: No results found for: \"BGM\", \"HCG\", \"HCGS\"  HEPATIC:   Lab Results   Component Value Date    ALBUMIN 4.5 12/11/2023    PROTTOTAL 7.2 12/11/2023    ALT 14 12/11/2023    AST 19 12/11/2023    ALKPHOS 75 12/11/2023    BILITOTAL 1.1 12/11/2023     OTHER:   Lab Results   Component Value Date    A1C 5.5 10/23/2017    CLARISSA 9.9 12/11/2023    MAG 2.2 07/14/2020    SED 20 04/26/2022       Anesthesia Plan    ASA Status:  2    NPO Status:  NPO Appropriate    Anesthesia Type: General.     - Airway: ETT   Induction: Propofol.   Maintenance: Balanced.        Consents    Anesthesia Plan(s) and " associated risks, benefits, and realistic alternatives discussed. Questions answered and patient/representative(s) expressed understanding.     - Discussed:     - Discussed with:  Patient            Postoperative Care    Pain management: Multi-modal analgesia.   PONV prophylaxis: Ondansetron (or other 5HT-3), Dexamethasone or Solumedrol, Background Propofol Infusion     Comments:               Primo Barajas MD    I have reviewed the pertinent notes and labs in the chart from the past 30 days and (re)examined the patient.  Any updates or changes from those notes are reflected in this note.

## 2024-01-02 NOTE — PROGRESS NOTES
PTA medications updated by Medication Scribe prior to surgery via phone call with patient (last doses completed by Nurse)     Medication history sources: Patient, Surescripts, and H&P  In the past week, patient estimated taking medication this percent of the time: Greater than 90%      Significant changes made to the medication list:  None      Additional medication history information:   None    Medication reconciliation completed by provider prior to medication history? No    Time spent in this activity: 20 MINUTES    The information provided in this note is only as accurate as the sources available at the time of update(s)      Prior to Admission medications    Medication Sig Last Dose Taking? Auth Provider Long Term End Date   acetaminophen (TYLENOL) 500 MG tablet Take 1,000 mg by mouth every 8 hours as needed for pain  at PRN Yes Provider, Historical     amLODIPine (NORVASC) 5 MG tablet Take 5 mg by mouth daily  at AM Yes Reported, Patient Yes    hydrochlorothiazide (HYDRODIURIL) 25 MG tablet Take 1 tablet (25 mg) by mouth daily  at AM Yes Jesus Liao MD Yes    pantoprazole (PROTONIX) 40 MG tablet [PANTOPRAZOLE (PROTONIX) 40 MG TABLET] Take 40 mg by mouth daily.  at AM Yes Provider, Historical     valsartan (DIOVAN) 160 MG tablet TAKE 1 TABLET DAILY  at AM Yes Jesus Liao MD Yes    budesonide-formoteroL (SYMBICORT) 160-4.5 mcg/actuation inhaler [BUDESONIDE-FORMOTEROL (SYMBICORT) 160-4.5 MCG/ACTUATION INHALER] TAKE 2 PUFFS BY MOUTH TWICE A DAY  Patient not taking: Reported on 7/12/2021   Edith Cisneros MD Yes 7/12/21       Medication history completed by: Nora Alston

## 2024-01-03 ENCOUNTER — APPOINTMENT (OUTPATIENT)
Dept: GENERAL RADIOLOGY | Facility: CLINIC | Age: 79
End: 2024-01-03
Attending: ORTHOPAEDIC SURGERY
Payer: COMMERCIAL

## 2024-01-03 ENCOUNTER — HOSPITAL ENCOUNTER (OUTPATIENT)
Facility: CLINIC | Age: 79
Discharge: HOME | End: 2024-01-04
Attending: ORTHOPAEDIC SURGERY | Admitting: ORTHOPAEDIC SURGERY
Payer: COMMERCIAL

## 2024-01-03 ENCOUNTER — ANESTHESIA (OUTPATIENT)
Dept: SURGERY | Facility: CLINIC | Age: 79
End: 2024-01-03
Payer: COMMERCIAL

## 2024-01-03 DIAGNOSIS — M81.0 AGE-RELATED OSTEOPOROSIS WITHOUT CURRENT PATHOLOGICAL FRACTURE: ICD-10-CM

## 2024-01-03 PROBLEM — M12.811 ROTATOR CUFF TEAR ARTHROPATHY, RIGHT: Status: ACTIVE | Noted: 2024-01-03

## 2024-01-03 PROBLEM — M75.101 ROTATOR CUFF TEAR ARTHROPATHY, RIGHT: Status: ACTIVE | Noted: 2024-01-03

## 2024-01-03 LAB
FASTING STATUS PATIENT QL REPORTED: YES
GLUCOSE SERPL-MCNC: 110 MG/DL (ref 70–99)
POTASSIUM SERPL-SCNC: 4.5 MMOL/L (ref 3.4–5.3)

## 2024-01-03 PROCEDURE — 370N000017 HC ANESTHESIA TECHNICAL FEE, PER MIN: Performed by: ORTHOPAEDIC SURGERY

## 2024-01-03 PROCEDURE — 250N000009 HC RX 250: Performed by: PHYSICIAN ASSISTANT

## 2024-01-03 PROCEDURE — C1776 JOINT DEVICE (IMPLANTABLE): HCPCS | Performed by: ORTHOPAEDIC SURGERY

## 2024-01-03 PROCEDURE — 36415 COLL VENOUS BLD VENIPUNCTURE: CPT | Performed by: ANESTHESIOLOGY

## 2024-01-03 PROCEDURE — 250N000025 HC SEVOFLURANE, PER MIN: Performed by: ORTHOPAEDIC SURGERY

## 2024-01-03 PROCEDURE — 250N000009 HC RX 250: Performed by: NURSE ANESTHETIST, CERTIFIED REGISTERED

## 2024-01-03 PROCEDURE — 258N000003 HC RX IP 258 OP 636: Performed by: ANESTHESIOLOGY

## 2024-01-03 PROCEDURE — 999N000141 HC STATISTIC PRE-PROCEDURE NURSING ASSESSMENT: Performed by: ORTHOPAEDIC SURGERY

## 2024-01-03 PROCEDURE — 271N000001 HC OR GENERAL SUPPLY NON-STERILE: Performed by: ORTHOPAEDIC SURGERY

## 2024-01-03 PROCEDURE — 250N000009 HC RX 250: Performed by: ORTHOPAEDIC SURGERY

## 2024-01-03 PROCEDURE — 250N000011 HC RX IP 250 OP 636: Performed by: ORTHOPAEDIC SURGERY

## 2024-01-03 PROCEDURE — 272N000001 HC OR GENERAL SUPPLY STERILE: Performed by: ORTHOPAEDIC SURGERY

## 2024-01-03 PROCEDURE — 258N000003 HC RX IP 258 OP 636: Performed by: ORTHOPAEDIC SURGERY

## 2024-01-03 PROCEDURE — 250N000013 HC RX MED GY IP 250 OP 250 PS 637: Performed by: ORTHOPAEDIC SURGERY

## 2024-01-03 PROCEDURE — 250N000011 HC RX IP 250 OP 636: Performed by: ANESTHESIOLOGY

## 2024-01-03 PROCEDURE — 84132 ASSAY OF SERUM POTASSIUM: CPT | Performed by: ANESTHESIOLOGY

## 2024-01-03 PROCEDURE — 258N000001 HC RX 258: Performed by: ORTHOPAEDIC SURGERY

## 2024-01-03 PROCEDURE — L3670 SO ACRO/CLAV CAN WEB PRE OTS: HCPCS

## 2024-01-03 PROCEDURE — 250N000011 HC RX IP 250 OP 636: Performed by: PHYSICIAN ASSISTANT

## 2024-01-03 PROCEDURE — 999N000065 XR SHOULDER RIGHT PORT G/E 2 VIEWS: Mod: RT

## 2024-01-03 PROCEDURE — 360N000077 HC SURGERY LEVEL 4, PER MIN: Performed by: ORTHOPAEDIC SURGERY

## 2024-01-03 PROCEDURE — 82947 ASSAY GLUCOSE BLOOD QUANT: CPT | Performed by: ANESTHESIOLOGY

## 2024-01-03 PROCEDURE — 710N000009 HC RECOVERY PHASE 1, LEVEL 1, PER MIN: Performed by: ORTHOPAEDIC SURGERY

## 2024-01-03 PROCEDURE — 250N000011 HC RX IP 250 OP 636: Performed by: NURSE ANESTHETIST, CERTIFIED REGISTERED

## 2024-01-03 DEVICE — IMP SCR LOCKING 4.5X26MM AQLS DWD026: Type: IMPLANTABLE DEVICE | Site: SHOULDER | Status: FUNCTIONAL

## 2024-01-03 DEVICE — IMPLANTABLE DEVICE
Type: IMPLANTABLE DEVICE | Site: SHOULDER | Status: FUNCTIONAL
Brand: TORNIER FLEX SHOULDER SYSTEM

## 2024-01-03 DEVICE — IMPLANTABLE DEVICE
Type: IMPLANTABLE DEVICE | Site: SHOULDER | Status: FUNCTIONAL
Brand: AEQUALIS REVERSED II

## 2024-01-03 DEVICE — IMP SCR LOCKING 4.5X35MM AQLS DWD035: Type: IMPLANTABLE DEVICE | Site: SHOULDER | Status: FUNCTIONAL

## 2024-01-03 RX ORDER — TRANEXAMIC ACID 10 MG/ML
1 INJECTION, SOLUTION INTRAVENOUS ONCE
Status: COMPLETED | OUTPATIENT
Start: 2024-01-03 | End: 2024-01-03

## 2024-01-03 RX ORDER — SODIUM CHLORIDE, SODIUM LACTATE, POTASSIUM CHLORIDE, CALCIUM CHLORIDE 600; 310; 30; 20 MG/100ML; MG/100ML; MG/100ML; MG/100ML
INJECTION, SOLUTION INTRAVENOUS CONTINUOUS
Status: DISCONTINUED | OUTPATIENT
Start: 2024-01-03 | End: 2024-01-04 | Stop reason: HOSPADM

## 2024-01-03 RX ORDER — BISACODYL 10 MG
10 SUPPOSITORY, RECTAL RECTAL DAILY PRN
Status: DISCONTINUED | OUTPATIENT
Start: 2024-01-03 | End: 2024-01-04 | Stop reason: HOSPADM

## 2024-01-03 RX ORDER — CEFAZOLIN SODIUM 2 G/100ML
2 INJECTION, SOLUTION INTRAVENOUS EVERY 8 HOURS
Qty: 200 ML | Refills: 0 | Status: COMPLETED | OUTPATIENT
Start: 2024-01-03 | End: 2024-01-04

## 2024-01-03 RX ORDER — FENTANYL CITRATE 50 UG/ML
INJECTION, SOLUTION INTRAMUSCULAR; INTRAVENOUS PRN
Status: DISCONTINUED | OUTPATIENT
Start: 2024-01-03 | End: 2024-01-03

## 2024-01-03 RX ORDER — SODIUM CHLORIDE, SODIUM LACTATE, POTASSIUM CHLORIDE, CALCIUM CHLORIDE 600; 310; 30; 20 MG/100ML; MG/100ML; MG/100ML; MG/100ML
INJECTION, SOLUTION INTRAVENOUS CONTINUOUS
Status: DISCONTINUED | OUTPATIENT
Start: 2024-01-03 | End: 2024-01-03 | Stop reason: HOSPADM

## 2024-01-03 RX ORDER — NALOXONE HYDROCHLORIDE 0.4 MG/ML
0.2 INJECTION, SOLUTION INTRAMUSCULAR; INTRAVENOUS; SUBCUTANEOUS
Status: DISCONTINUED | OUTPATIENT
Start: 2024-01-03 | End: 2024-01-04 | Stop reason: HOSPADM

## 2024-01-03 RX ORDER — PROCHLORPERAZINE MALEATE 5 MG
5 TABLET ORAL EVERY 6 HOURS PRN
Status: DISCONTINUED | OUTPATIENT
Start: 2024-01-03 | End: 2024-01-04 | Stop reason: HOSPADM

## 2024-01-03 RX ORDER — ACETAMINOPHEN 325 MG/1
650 TABLET ORAL EVERY 4 HOURS PRN
Status: DISCONTINUED | OUTPATIENT
Start: 2024-01-06 | End: 2024-01-04 | Stop reason: HOSPADM

## 2024-01-03 RX ORDER — NALOXONE HYDROCHLORIDE 0.4 MG/ML
0.4 INJECTION, SOLUTION INTRAMUSCULAR; INTRAVENOUS; SUBCUTANEOUS
Status: DISCONTINUED | OUTPATIENT
Start: 2024-01-03 | End: 2024-01-04 | Stop reason: HOSPADM

## 2024-01-03 RX ORDER — DEXAMETHASONE SODIUM PHOSPHATE 4 MG/ML
INJECTION, SOLUTION INTRA-ARTICULAR; INTRALESIONAL; INTRAMUSCULAR; INTRAVENOUS; SOFT TISSUE PRN
Status: DISCONTINUED | OUTPATIENT
Start: 2024-01-03 | End: 2024-01-03

## 2024-01-03 RX ORDER — PROPOFOL 10 MG/ML
INJECTION, EMULSION INTRAVENOUS CONTINUOUS PRN
Status: DISCONTINUED | OUTPATIENT
Start: 2024-01-03 | End: 2024-01-03

## 2024-01-03 RX ORDER — HYDROMORPHONE HCL IN WATER/PF 6 MG/30 ML
0.4 PATIENT CONTROLLED ANALGESIA SYRINGE INTRAVENOUS EVERY 5 MIN PRN
Status: DISCONTINUED | OUTPATIENT
Start: 2024-01-03 | End: 2024-01-03 | Stop reason: HOSPADM

## 2024-01-03 RX ORDER — LIDOCAINE 40 MG/G
CREAM TOPICAL
Status: DISCONTINUED | OUTPATIENT
Start: 2024-01-03 | End: 2024-01-04 | Stop reason: HOSPADM

## 2024-01-03 RX ORDER — POLYETHYLENE GLYCOL 3350 17 G/17G
17 POWDER, FOR SOLUTION ORAL DAILY
Status: DISCONTINUED | OUTPATIENT
Start: 2024-01-04 | End: 2024-01-04 | Stop reason: HOSPADM

## 2024-01-03 RX ORDER — DIPHENHYDRAMINE HCL 12.5MG/5ML
12.5 LIQUID (ML) ORAL EVERY 6 HOURS PRN
Status: DISCONTINUED | OUTPATIENT
Start: 2024-01-03 | End: 2024-01-04 | Stop reason: HOSPADM

## 2024-01-03 RX ORDER — ONDANSETRON 2 MG/ML
4 INJECTION INTRAMUSCULAR; INTRAVENOUS EVERY 6 HOURS PRN
Status: DISCONTINUED | OUTPATIENT
Start: 2024-01-03 | End: 2024-01-04 | Stop reason: HOSPADM

## 2024-01-03 RX ORDER — ASPIRIN 325 MG
325 TABLET, DELAYED RELEASE (ENTERIC COATED) ORAL DAILY
Qty: 21 TABLET | Refills: 0 | Status: SHIPPED | OUTPATIENT
Start: 2024-01-03 | End: 2024-01-24

## 2024-01-03 RX ORDER — VALSARTAN 160 MG/1
160 TABLET ORAL DAILY
Status: DISCONTINUED | OUTPATIENT
Start: 2024-01-04 | End: 2024-01-04 | Stop reason: HOSPADM

## 2024-01-03 RX ORDER — HYDROXYZINE HYDROCHLORIDE 10 MG/1
10 TABLET, FILM COATED ORAL EVERY 6 HOURS PRN
Qty: 30 TABLET | Refills: 0 | Status: SHIPPED | OUTPATIENT
Start: 2024-01-03

## 2024-01-03 RX ORDER — LIDOCAINE HYDROCHLORIDE 20 MG/ML
INJECTION, SOLUTION INFILTRATION; PERINEURAL PRN
Status: DISCONTINUED | OUTPATIENT
Start: 2024-01-03 | End: 2024-01-03

## 2024-01-03 RX ORDER — ONDANSETRON 2 MG/ML
4 INJECTION INTRAMUSCULAR; INTRAVENOUS EVERY 30 MIN PRN
Status: DISCONTINUED | OUTPATIENT
Start: 2024-01-03 | End: 2024-01-03 | Stop reason: HOSPADM

## 2024-01-03 RX ORDER — OXYCODONE HYDROCHLORIDE 5 MG/1
5-10 TABLET ORAL EVERY 4 HOURS PRN
Qty: 30 TABLET | Refills: 0 | Status: SHIPPED | OUTPATIENT
Start: 2024-01-03

## 2024-01-03 RX ORDER — FENTANYL CITRATE 0.05 MG/ML
25 INJECTION, SOLUTION INTRAMUSCULAR; INTRAVENOUS EVERY 5 MIN PRN
Status: DISCONTINUED | OUTPATIENT
Start: 2024-01-03 | End: 2024-01-03 | Stop reason: HOSPADM

## 2024-01-03 RX ORDER — HYDROMORPHONE HCL IN WATER/PF 6 MG/30 ML
0.1 PATIENT CONTROLLED ANALGESIA SYRINGE INTRAVENOUS
Status: DISCONTINUED | OUTPATIENT
Start: 2024-01-03 | End: 2024-01-04 | Stop reason: HOSPADM

## 2024-01-03 RX ORDER — HYDROMORPHONE HCL IN WATER/PF 6 MG/30 ML
0.2 PATIENT CONTROLLED ANALGESIA SYRINGE INTRAVENOUS EVERY 5 MIN PRN
Status: DISCONTINUED | OUTPATIENT
Start: 2024-01-03 | End: 2024-01-03 | Stop reason: HOSPADM

## 2024-01-03 RX ORDER — CEFAZOLIN SODIUM/WATER 2 G/20 ML
2 SYRINGE (ML) INTRAVENOUS SEE ADMIN INSTRUCTIONS
Status: DISCONTINUED | OUTPATIENT
Start: 2024-01-03 | End: 2024-01-03 | Stop reason: HOSPADM

## 2024-01-03 RX ORDER — FENTANYL CITRATE 0.05 MG/ML
50 INJECTION, SOLUTION INTRAMUSCULAR; INTRAVENOUS EVERY 5 MIN PRN
Status: DISCONTINUED | OUTPATIENT
Start: 2024-01-03 | End: 2024-01-03 | Stop reason: HOSPADM

## 2024-01-03 RX ORDER — OXYCODONE HYDROCHLORIDE 5 MG/1
5 TABLET ORAL
Status: DISCONTINUED | OUTPATIENT
Start: 2024-01-03 | End: 2024-01-04 | Stop reason: HOSPADM

## 2024-01-03 RX ORDER — CEFAZOLIN SODIUM/WATER 2 G/20 ML
2 SYRINGE (ML) INTRAVENOUS
Status: COMPLETED | OUTPATIENT
Start: 2024-01-03 | End: 2024-01-03

## 2024-01-03 RX ORDER — ONDANSETRON 4 MG/1
4 TABLET, ORALLY DISINTEGRATING ORAL EVERY 6 HOURS PRN
Status: DISCONTINUED | OUTPATIENT
Start: 2024-01-03 | End: 2024-01-04 | Stop reason: HOSPADM

## 2024-01-03 RX ORDER — ONDANSETRON 4 MG/1
4 TABLET, ORALLY DISINTEGRATING ORAL EVERY 30 MIN PRN
Status: DISCONTINUED | OUTPATIENT
Start: 2024-01-03 | End: 2024-01-03 | Stop reason: HOSPADM

## 2024-01-03 RX ORDER — AMOXICILLIN 250 MG
1-2 CAPSULE ORAL 2 TIMES DAILY
Qty: 30 TABLET | Refills: 0 | Status: SHIPPED | OUTPATIENT
Start: 2024-01-03

## 2024-01-03 RX ORDER — AMLODIPINE BESYLATE 5 MG/1
5 TABLET ORAL DAILY
Status: DISCONTINUED | OUTPATIENT
Start: 2024-01-04 | End: 2024-01-04 | Stop reason: HOSPADM

## 2024-01-03 RX ORDER — ACETAMINOPHEN 325 MG/1
975 TABLET ORAL EVERY 8 HOURS
Qty: 27 TABLET | Refills: 0 | Status: DISCONTINUED | OUTPATIENT
Start: 2024-01-03 | End: 2024-01-04 | Stop reason: HOSPADM

## 2024-01-03 RX ORDER — HYDROXYZINE HYDROCHLORIDE 10 MG/1
10 TABLET, FILM COATED ORAL EVERY 6 HOURS PRN
Status: DISCONTINUED | OUTPATIENT
Start: 2024-01-03 | End: 2024-01-04 | Stop reason: HOSPADM

## 2024-01-03 RX ORDER — ASPIRIN 325 MG
325 TABLET, DELAYED RELEASE (ENTERIC COATED) ORAL DAILY
Status: DISCONTINUED | OUTPATIENT
Start: 2024-01-03 | End: 2024-01-04 | Stop reason: HOSPADM

## 2024-01-03 RX ORDER — PANTOPRAZOLE SODIUM 40 MG/1
40 TABLET, DELAYED RELEASE ORAL DAILY
Status: DISCONTINUED | OUTPATIENT
Start: 2024-01-04 | End: 2024-01-04 | Stop reason: HOSPADM

## 2024-01-03 RX ORDER — HYDROMORPHONE HCL IN WATER/PF 6 MG/30 ML
0.2 PATIENT CONTROLLED ANALGESIA SYRINGE INTRAVENOUS
Status: DISCONTINUED | OUTPATIENT
Start: 2024-01-03 | End: 2024-01-04 | Stop reason: HOSPADM

## 2024-01-03 RX ORDER — PROPOFOL 10 MG/ML
INJECTION, EMULSION INTRAVENOUS PRN
Status: DISCONTINUED | OUTPATIENT
Start: 2024-01-03 | End: 2024-01-03

## 2024-01-03 RX ORDER — ACETAMINOPHEN 500 MG
1000 TABLET ORAL EVERY 8 HOURS PRN
Status: DISCONTINUED | OUTPATIENT
Start: 2024-01-03 | End: 2024-01-03

## 2024-01-03 RX ORDER — AMOXICILLIN 250 MG
1 CAPSULE ORAL 2 TIMES DAILY
Status: DISCONTINUED | OUTPATIENT
Start: 2024-01-03 | End: 2024-01-04 | Stop reason: HOSPADM

## 2024-01-03 RX ORDER — HYDROCHLOROTHIAZIDE 25 MG/1
25 TABLET ORAL DAILY
Status: DISCONTINUED | OUTPATIENT
Start: 2024-01-04 | End: 2024-01-04 | Stop reason: HOSPADM

## 2024-01-03 RX ADMIN — ASPIRIN 325 MG: 325 TABLET, COATED ORAL at 16:52

## 2024-01-03 RX ADMIN — HYDROMORPHONE HYDROCHLORIDE 0.2 MG: 0.2 INJECTION, SOLUTION INTRAMUSCULAR; INTRAVENOUS; SUBCUTANEOUS at 15:17

## 2024-01-03 RX ADMIN — SUGAMMADEX 200 MG: 100 INJECTION, SOLUTION INTRAVENOUS at 14:32

## 2024-01-03 RX ADMIN — HYDROMORPHONE HYDROCHLORIDE 0.4 MG: 0.2 INJECTION, SOLUTION INTRAMUSCULAR; INTRAVENOUS; SUBCUTANEOUS at 15:24

## 2024-01-03 RX ADMIN — OXYCODONE HYDROCHLORIDE 5 MG: 5 TABLET ORAL at 16:52

## 2024-01-03 RX ADMIN — LIDOCAINE HYDROCHLORIDE 60 MG: 20 INJECTION, SOLUTION INFILTRATION; PERINEURAL at 12:55

## 2024-01-03 RX ADMIN — SODIUM CHLORIDE, POTASSIUM CHLORIDE, SODIUM LACTATE AND CALCIUM CHLORIDE: 600; 310; 30; 20 INJECTION, SOLUTION INTRAVENOUS at 10:51

## 2024-01-03 RX ADMIN — FENTANYL CITRATE 50 MCG: 50 INJECTION INTRAMUSCULAR; INTRAVENOUS at 12:55

## 2024-01-03 RX ADMIN — FENTANYL CITRATE 25 MCG: 50 INJECTION, SOLUTION INTRAMUSCULAR; INTRAVENOUS at 15:02

## 2024-01-03 RX ADMIN — HYDROMORPHONE HYDROCHLORIDE 0.5 MG: 1 INJECTION, SOLUTION INTRAMUSCULAR; INTRAVENOUS; SUBCUTANEOUS at 13:12

## 2024-01-03 RX ADMIN — TRANEXAMIC ACID 1 G: 10 INJECTION, SOLUTION INTRAVENOUS at 13:00

## 2024-01-03 RX ADMIN — HYDROMORPHONE HYDROCHLORIDE 0.5 MG: 1 INJECTION, SOLUTION INTRAMUSCULAR; INTRAVENOUS; SUBCUTANEOUS at 14:32

## 2024-01-03 RX ADMIN — PROPOFOL 20 MCG/KG/MIN: 10 INJECTION, EMULSION INTRAVENOUS at 13:10

## 2024-01-03 RX ADMIN — ROCURONIUM BROMIDE 50 MG: 50 INJECTION, SOLUTION INTRAVENOUS at 12:55

## 2024-01-03 RX ADMIN — PROPOFOL 150 MG: 10 INJECTION, EMULSION INTRAVENOUS at 12:55

## 2024-01-03 RX ADMIN — Medication 2 G: at 12:52

## 2024-01-03 RX ADMIN — OXYCODONE HYDROCHLORIDE 5 MG: 5 TABLET ORAL at 20:53

## 2024-01-03 RX ADMIN — MIDAZOLAM 2 MG: 1 INJECTION INTRAMUSCULAR; INTRAVENOUS at 12:50

## 2024-01-03 RX ADMIN — ACETAMINOPHEN 975 MG: 325 TABLET, FILM COATED ORAL at 16:52

## 2024-01-03 RX ADMIN — FENTANYL CITRATE 50 MCG: 50 INJECTION INTRAMUSCULAR; INTRAVENOUS at 13:24

## 2024-01-03 RX ADMIN — TRANEXAMIC ACID 1 G: 10 INJECTION, SOLUTION INTRAVENOUS at 14:15

## 2024-01-03 RX ADMIN — SODIUM CHLORIDE, POTASSIUM CHLORIDE, SODIUM LACTATE AND CALCIUM CHLORIDE: 600; 310; 30; 20 INJECTION, SOLUTION INTRAVENOUS at 16:37

## 2024-01-03 RX ADMIN — CEFAZOLIN SODIUM 2 G: 2 INJECTION, SOLUTION INTRAVENOUS at 20:54

## 2024-01-03 RX ADMIN — DEXAMETHASONE SODIUM PHOSPHATE 10 MG: 4 INJECTION, SOLUTION INTRA-ARTICULAR; INTRALESIONAL; INTRAMUSCULAR; INTRAVENOUS; SOFT TISSUE at 13:14

## 2024-01-03 RX ADMIN — FENTANYL CITRATE 25 MCG: 50 INJECTION, SOLUTION INTRAMUSCULAR; INTRAVENOUS at 14:46

## 2024-01-03 RX ADMIN — DOCUSATE SODIUM 50 MG AND SENNOSIDES 8.6 MG 1 TABLET: 8.6; 5 TABLET, FILM COATED ORAL at 20:53

## 2024-01-03 ASSESSMENT — ACTIVITIES OF DAILY LIVING (ADL)
ADLS_ACUITY_SCORE: 24
ADLS_ACUITY_SCORE: 29
ADLS_ACUITY_SCORE: 22
ADLS_ACUITY_SCORE: 25
ADLS_ACUITY_SCORE: 25
ADLS_ACUITY_SCORE: 22
ADLS_ACUITY_SCORE: 22

## 2024-01-03 ASSESSMENT — ENCOUNTER SYMPTOMS
ORTHOPNEA: 0
DYSRHYTHMIAS: 0
SEIZURES: 0

## 2024-01-03 NOTE — OP NOTE
PREOPERATIVE DIAGNOSES:   1.  Right rotator cuff tear arthropathy, advanced.     POSTOPERATIVE DIAGNOSES:     1.  Right rotator cuff tear arthropathy, advanced.     PROCEDURES:   1.  Right reverse total shoulder arthroplasty.     SURGEON:  Fermin Vazquez MD      ASSISTANT:  Reji Christianson PA-C      ANESTHESIA:  General endotracheal.      ESTIMATED BLOOD LOSS:  100 mL.      FLUID REPLACEMENT:  Two liters crystalloid.      COMPLICATIONS:  No immediate complications.      INDICATIONS:  Please see preoperative clinical notes.      EXAMINATION OF RIGHT SHOULDER UNDER ANESTHESIA FINDINGS:  Passive range of motion 145/70/40/70/40.      COMPONENTS UTILIZED:   1.  Tornier Ascend Flex humeral component, size 2B, secured in roughly 20 degrees retroversion with cementless fixation.   2.  Standard low offset tray with maximal offset posterior and slightly inferior.   3.  A 6 mm polyethylene insert.   4.  Standard 25 mm baseplate.  5.  36mm +4mm lateralized glenosphere component.      SCREW CONFIGURATION:   1.  Superior locking screw, 35 mm.   2.  Inferior locking screw, 26 mm.      DESCRIPTION OF PROCEDURE:  The patient was identified in the preoperative holding area and taken to room #40 of the main OR.  Monitors were placed per the Anesthesia Service.  After smooth IV induction, general anesthesia was introduced and her endotracheal tube secured.  She was given 2g Ancef IV.  She was then repositioned in the modified beach chair position with the head and neck secured in neutral position and all peripheral extremities thoroughly padded with foam.  Right upper extremity was widely prepped and draped in the usual sterile fashion.  REGI hose and pneumoboots were in place and operational during the procedure.      Surgical team took timeout to confirm the correct patient, correct site marking, correct equipment and implants, correct images were available, and that she had been administered IV antibiotic therapy.      An oblique  skin incision was centered over the anterior right shoulder.  Skin flaps elevated.  Cephalic vein identified, retracted laterally with the deltoid, the pectoralis and conjoined tendons, swept medially in successive layers without excessive tension on the musculocutaneous nerve.      The subscapularis was tenotomized en bloc with the anterior capsule and elevated medially, while protecting the medial neurovascular bundle.  There was chronic massive posterosuperior rotator cuff tear.  This was deemed insufficient to allow good function postoperatively with an anatomic component, and therefore a reverse component was selected.  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  The shoulder was then gently dislocated.  Marginal osteophytes which were significant were removed circumferentially around the periphery of the humeral head.  An anatomic neck cut was then performed, and favored a slightly inferior position due to reverse implant placement.  Reaming and broaching was then undertaken up to a size 2B trial, which had excellent fit and no instability.  A low offset tray had appropriate coverage posteroinferior and was accepted in that setting.      The glenoid was then circumferentially exposed.  A complete labrectomy/capsulectomy was performed anterior and posterior to allow full visualization.  The central starting point for reaming was undertaken and this was revisited with sequential reamers, as well as the high speed bur.  Thorough waterpik irrigation was utilized to remove all bony debris peripherally.  The opening drill was then utilized and after thorough irrigation, we then impacted the baseplate to appropriate press-fit with excellent fixation and backed this up with superior and inferior locking screws.  Anterior and posterior compression screws were not placed due to the shallow nature of the native glenoid, that did not allow further screw fixation.  Overall, excellent fixation of the  construct was noted.  Thorough irrigation was then performed.  After thorough lavage, the glenosphere was impacted and had excellent press-fit and then backed up with a central locking screw.      We then repeated trialing on the humeral side and a 6 mm insert that had appropriate soft tissue tension without rocking or instability was accepted.  The humeral trials were then removed.  Thorough irrigation was then performed.  The stem was impacted to appropriate press-fit with excellent security.  The trunnion was pulse-lavaged and suctioned dry, and the tray was then impacted in the chosen setting.  Following this, the polyethylene insert was snap fit and impacted with excellent fixation.  The shoulder was then reduced, found to have excellent soft tissue tension, no signs of rocking or instability throughout a full range of motion was accepted.      The arm was then placed in the 30/30 position.  After thorough irrigation, we closed the subscapularis and anterior capsule back to the lateral soft tissues, as well as the lesser tuberosity with several figure-of-eight #2 Ethibond sutures.  The leading edge of the coracoacromial ligament was then oversewn to the subscapularis as well, closing the rotator interval loosely to cover the implant.      The axillary nerve was palpated and found to be intact.  After thorough irrigation, we then closed the deltopectoral interval over a medium Hemovac drain, brought through clean anterolateral skin with #2 Ethibond suture.  The deltoid and pectoralis musculature, as well as the subcutaneous tissue planes were then infiltrated with a total of 30 mL of 0.5% ropivacaine plain, 1 mg Duramorph and 15 mg Toradol.  The skin was closed in layers with 3-0 Monocryl and 3-0 Prolene running subcuticular for skin.  Steri-Strips and a bulky sterile dressing were applied.  She was then placed into an EZ wrap device and UltraSling in neutral position, was then reversed, extubated and taken back  to the recovery room in stable condition.  There were no immediate complications and she appeared to tolerate the procedure well.      A skilled first assistant was necessary for this procedure for assistance with patient positioning, prepping, draping, surgical visualization, implantation of the prosthesis, wound closure, dressing and sling application.      PQRI MEASURES:   1.  The patient was given 2 g Ancef IV within 1 hour prior to skin incision.  IV antibiotic therapy was discontinued within 24 hours postoperatively.   2.  Deep venous thrombosis prophylaxis will be with aspirin x 3 weeks.  3.  UltraSling x 6 weeks for protection.   4.  Standard phase 1 reverse TSA protocol for physical therapy.   5.  She should have a true AP and outlet radiograph of the right shoulder at return to office in 10 days for suture removal.

## 2024-01-03 NOTE — PROGRESS NOTES
S: Pt. was not seen. Sammy ADAMES. RX: Ultrasling 3 right. DX: Shoulder surgery.   O:A: Today I delivered an Ultrasling 3 size Medium to the Pre-op pool room.  P: Pt. to be seen as needed.    SABRINA Sung

## 2024-01-03 NOTE — ANESTHESIA POSTPROCEDURE EVALUATION
Patient: Stacey Allen    Procedure: Procedure(s):  Right Reverse Total Shoulder Arthroplasty       Anesthesia Type:  General    Note:  Disposition: Inpatient   Postop Pain Control: Uneventful            Sign Out: Well controlled pain   PONV:    Neuro/Psych: Uneventful            Sign Out: Acceptable/Baseline neuro status   Airway/Respiratory: Uneventful            Sign Out: Acceptable/Baseline resp. status   CV/Hemodynamics: Uneventful            Sign Out: Acceptable CV status   Other NRE: NONE   DID A NON-ROUTINE EVENT OCCUR? No           Last vitals:  Vitals Value Taken Time   /74 01/03/24 1544   Temp 36.7  C (98  F) 01/03/24 1442   Pulse 82 01/03/24 1549   Resp 14 01/03/24 1549   SpO2 97 % 01/03/24 1549   Vitals shown include unfiled device data.    Electronically Signed By: Preston Rizo MD  January 3, 2024  4:28 PM

## 2024-01-03 NOTE — ANESTHESIA CARE TRANSFER NOTE
Patient: Stacey Allen    Procedure: Procedure(s):  Right Reverse Total Shoulder Arthroplasty       Diagnosis: Rotator cuff arthropathy of right shoulder [M12.811]  Diagnosis Additional Information: No value filed.    Anesthesia Type:   General     Note:    Oropharynx: oropharynx clear of all foreign objects  Level of Consciousness: awake  Oxygen Supplementation: face mask  Level of Supplemental Oxygen (L/min / FiO2): 6  Independent Airway: airway patency satisfactory and stable  Dentition: dentition unchanged      Patient transferred to: PACU    Handoff Report: Identifed the Patient, Identified the Reponsible Provider, Reviewed the pertinent medical history, Discussed the surgical course, Reviewed Intra-OP anesthesia mangement and issues during anesthesia, Set expectations for post-procedure period and Allowed opportunity for questions and acknowledgement of understanding      Vitals:  Vitals Value Taken Time   BP     Temp     Pulse     Resp     SpO2         Electronically Signed By: DEBRA Servin CRNA  January 3, 2024  2:39 PM

## 2024-01-03 NOTE — ANESTHESIA PROCEDURE NOTES
Airway       Patient location during procedure: OR       Procedure Start/Stop Times: 1/3/2024 12:57 PM  Staff -        CRNA: Camille Hinds APRN CRNA       Performed By: CRNA  Consent for Airway        Urgency: elective  Indications and Patient Condition       Indications for airway management: sourav-procedural       Induction type:intravenous       Mask difficulty assessment: 2 - vent by mask + OA or adjuvant +/- NMBA    Final Airway Details       Final airway type: endotracheal airway       Successful airway: ETT - single  Endotracheal Airway Details        ETT size (mm): 7.0       Cuffed: yes       Successful intubation technique: video laryngoscopy       VL Blade Size: Romero 3       Grade View of Cords: 1       Adjucts: stylet       Position: Right       Measured from: gums/teeth       Secured at (cm): 21       Bite block used: None    Post intubation assessment        Placement verified by: capnometry, equal breath sounds and chest rise        Number of attempts at approach: 1       Secured with: tape       Ease of procedure: easy       Dentition: Intact and Unchanged    Medication(s) Administered   Medication Administration Time: 1/3/2024 12:57 PM

## 2024-01-04 ENCOUNTER — APPOINTMENT (OUTPATIENT)
Dept: OCCUPATIONAL THERAPY | Facility: CLINIC | Age: 79
End: 2024-01-04
Attending: ORTHOPAEDIC SURGERY
Payer: COMMERCIAL

## 2024-01-04 VITALS
OXYGEN SATURATION: 96 % | WEIGHT: 200 LBS | DIASTOLIC BLOOD PRESSURE: 79 MMHG | HEART RATE: 72 BPM | HEIGHT: 65 IN | RESPIRATION RATE: 18 BRPM | TEMPERATURE: 97.7 F | BODY MASS INDEX: 33.32 KG/M2 | SYSTOLIC BLOOD PRESSURE: 149 MMHG

## 2024-01-04 LAB
FASTING STATUS PATIENT QL REPORTED: NO
GLUCOSE SERPL-MCNC: 158 MG/DL (ref 70–99)
HGB BLD-MCNC: 10.8 G/DL (ref 11.7–15.7)

## 2024-01-04 PROCEDURE — 85018 HEMOGLOBIN: CPT | Performed by: ORTHOPAEDIC SURGERY

## 2024-01-04 PROCEDURE — 82947 ASSAY GLUCOSE BLOOD QUANT: CPT | Performed by: ORTHOPAEDIC SURGERY

## 2024-01-04 PROCEDURE — 250N000013 HC RX MED GY IP 250 OP 250 PS 637: Performed by: ORTHOPAEDIC SURGERY

## 2024-01-04 PROCEDURE — 97110 THERAPEUTIC EXERCISES: CPT | Mod: GO | Performed by: OCCUPATIONAL THERAPIST

## 2024-01-04 PROCEDURE — 97530 THERAPEUTIC ACTIVITIES: CPT | Mod: GO | Performed by: OCCUPATIONAL THERAPIST

## 2024-01-04 PROCEDURE — 97166 OT EVAL MOD COMPLEX 45 MIN: CPT | Mod: GO | Performed by: OCCUPATIONAL THERAPIST

## 2024-01-04 PROCEDURE — 36415 COLL VENOUS BLD VENIPUNCTURE: CPT | Performed by: ORTHOPAEDIC SURGERY

## 2024-01-04 PROCEDURE — 250N000011 HC RX IP 250 OP 636: Performed by: ORTHOPAEDIC SURGERY

## 2024-01-04 PROCEDURE — 97535 SELF CARE MNGMENT TRAINING: CPT | Mod: GO | Performed by: OCCUPATIONAL THERAPIST

## 2024-01-04 RX ADMIN — OXYCODONE HYDROCHLORIDE 5 MG: 5 TABLET ORAL at 02:09

## 2024-01-04 RX ADMIN — VALSARTAN 160 MG: 160 TABLET, FILM COATED ORAL at 08:59

## 2024-01-04 RX ADMIN — OXYCODONE HYDROCHLORIDE 5 MG: 5 TABLET ORAL at 08:59

## 2024-01-04 RX ADMIN — DOCUSATE SODIUM 50 MG AND SENNOSIDES 8.6 MG 1 TABLET: 8.6; 5 TABLET, FILM COATED ORAL at 09:00

## 2024-01-04 RX ADMIN — CEFAZOLIN SODIUM 2 G: 2 INJECTION, SOLUTION INTRAVENOUS at 05:05

## 2024-01-04 RX ADMIN — PANTOPRAZOLE SODIUM 40 MG: 40 TABLET, DELAYED RELEASE ORAL at 08:59

## 2024-01-04 RX ADMIN — ACETAMINOPHEN 975 MG: 325 TABLET, FILM COATED ORAL at 09:00

## 2024-01-04 RX ADMIN — ASPIRIN 325 MG: 325 TABLET, COATED ORAL at 09:00

## 2024-01-04 RX ADMIN — POLYETHYLENE GLYCOL 3350 17 G: 17 POWDER, FOR SOLUTION ORAL at 09:18

## 2024-01-04 RX ADMIN — ACETAMINOPHEN 975 MG: 325 TABLET, FILM COATED ORAL at 02:09

## 2024-01-04 RX ADMIN — AMLODIPINE BESYLATE 5 MG: 5 TABLET ORAL at 09:00

## 2024-01-04 RX ADMIN — HYDROCHLOROTHIAZIDE 25 MG: 25 TABLET ORAL at 09:00

## 2024-01-04 ASSESSMENT — ACTIVITIES OF DAILY LIVING (ADL)
ADLS_ACUITY_SCORE: 29

## 2024-01-04 NOTE — PROGRESS NOTES
"   01/04/24 0957   Appointment Info   Signing Clinician's Name / Credentials (OT) GLORIA Correia   Quick Adds   Quick Adds Certification   Living Environment   People in Home other (see comments)  (typically lives alone, daughter will be living with her for 6 weeks)   Current Living Arrangements house   Home Accessibility stairs to enter home;stairs within home  (Stairs are at every enterance and within the home as well.)   Number of Stairs, Main Entrance 3  (3 to the landing from the steps, and then another 6. Otherwise 7-8 stairs.)   Stair Railings, Main Entrance none  (Pt does not have any railing entering home. Pts daughter will assisting when entering from the front.)   Number of Stairs, Within Home, Primary six   Stair Railings, Within Home, Primary railings safe and in good condition   Self-Care   Usual Activity Tolerance moderate   Current Activity Tolerance fair   Equipment Currently Used at Home grab bar, toilet;tub bench;grab bar, tub/shower   Fall history within last six months no   Instrumental Activities of Daily Living (IADL)   Previous Responsibilities finances;work;meal prep;housekeeping;laundry;shopping;driving;medication management  (Pt has been doing the \"bear minimum\" the last 2 years as she has been in pain. Laundry is on the main floor. Pt also takes care of pets. Pt stated she has a company come to do yard work throughout the year.)   General Information   Onset of Illness/Injury or Date of Surgery 01/03/24   Referring Physician Fermin Vazquez MD   Patient/Family Therapy Goal Statement (OT) Pt to discharge home   Additional Occupational Profile Info/Pertinent History of Current Problem Right reverse total shoulder arthroplasty.   Existing Precautions/Restrictions fall;shoulder  (R UE Codeman Exercises, elbow to hand exercises)   Right Upper Extremity (Weight-bearing Status) non weight-bearing (NWB)   Cognitive Status Examination   Orientation Status orientation to person, place and " time   Affect/Mental Status (Cognitive) WNL   Follows Commands WNL   Visual Perception   Visual Impairment/Limitations corrective lenses full-time   Sensory   Sensory Comments Pt denies   Pain Assessment   Patient Currently in Pain Yes, see Vital Sign flowsheet  (3/10 when not moving R shoulder, 6/10 when moving)   Posture   Posture forward head position   Range of Motion Comprehensive   General Range of Motion upper extremity range of motion deficits identified  (Surgery on right shoulder)   General Upper Extremity Assessment (Range of Motion)   Comment: Upper Extremity ROM R shoulder precautions, R distal eblow to hand AAROM WFL   Upper Extremity: Range of Motion LUE ROM WNL;shoulder, left: UE ROM   Strength Comprehensive (MMT)   Comment, General Manual Muscle Testing (MMT) Assessment R UE weakness following shoulder surgery   Coordination   Upper Extremity Coordination Right UE impaired  (due to R shoulder surgery)   Bed Mobility   Bed Mobility supine-sit;scooting/bridging   Scooting/Bridging Orange (Bed Mobility) minimum assist (75% patient effort)   Supine-Sit Orange (Bed Mobility) minimum assist (75% patient effort)   Comment (Bed Mobility) Pt daughter stated that bed at home is high up. Pt has to hoist herself to get up. Pt has a step stool that she could use to help get up. Pt stated she has a recliner chair at home she thought she might use for sleep for increased mobility. Pt was able to get back into bed with the higher bed. Pt given min cues.   Transfers   Transfers sit-stand transfer;toilet transfer;bed-chair transfer  (OT educated on shower transfers. Pt stated they have a walk in shower with a bench.)   Transfer Skill: Bed to Chair/Chair to Bed   Bed-Chair Orange (Transfers) supervision   Transfer Comments SBA   Sit-Stand Transfer   Sit-Stand Orange (Transfers) contact guard   Toilet Transfer   Orange Level (Toilet Transfer) supervision   Upper Body Dressing  Assessment/Training   [place] Level (Upper Body Dressing) maximum assist (25% patient effort)   Lower Body Dressing Assessment/Training   [place] Level (Lower Body Dressing) minimum assist (75% patient effort)   Toileting   [place] Level (Toileting) supervision   Clinical Impression   Criteria for Skilled Therapeutic Interventions Met (OT) Yes, treatment indicated   OT Diagnosis Impaired independence with ADLS and functional mobility   Influenced by the following impairments Reduced ROM in R shoulder due to shoulder surgery. Increased pain along with NWB through right shoulder.   OT Problem List-Impairments impacting ADL mobility;range of motion (ROM);pain;post-surgical precautions;problems related to;strength   Assessment of Occupational Performance 3-5 Performance Deficits   Identified Performance Deficits ADLs consisting of dressing UE and LE, tolieting, showering along with IADLS cooking, laundry, and home maintiance etc.   Planned Therapy Interventions (OT) ADL retraining;home program guidelines;progressive activity/exercise   Clinical Decision Making Complexity (OT) detailed assessment/moderate complexity   Risk & Benefits of therapy have been explained evaluation/treatment results reviewed;care plan/treatment goals reviewed;risks/benefits reviewed;current/potential barriers reviewed;participants included;participants voiced agreement with care plan;patient;daughter   OT Total Evaluation Time   OT Eval, Moderate Complexity Minutes (59379) 67   Therapy Certification   Medical Diagnosis Right reverse total shoulder arthroplasty.   Start of Care Date 01/04/24   Certification date from 01/04/24   Certification date to 01/04/24   OT Goals   Therapy Frequency (OT) One time eval and treatment   OT Predicted Duration/Target Date for Goal Attainment 01/04/24   OT: Upper Body Dressing Modified independent   OT: Lower Body Dressing Modified independent   OT: Toilet Transfer/Toileting Modified independent    OT: Goal 1 Pt will independent with R shoulder post surgery: Mani Barkley, AAROM for elbow and wrist per MD.   Self-Care/Home Management   Self-Care/Home Mgmt/ADL, Compensatory, Meal Prep Minutes (80421) 25  (OT provided education for one handed dressing. Pt required min-moderate assistinance doffing hosptial gown and sling with supervision. OT gave verbal cues for orientation)   Symptoms Noted During/After Treatment (Meal Preparation/Planning Training) dizziness;fatigue   Treatment Detail/Skilled Intervention OT provided education and handout for one handed dressing to pt and daughter. Pt required min-moderate assistinance doffing hosptial gown and sling with supervision. OT gave verbal cues for orientation of sweat shirt while pt. donned sweat shirt with supervision. Pt was able to use non-surgical hand to manipulate sweatshirt on surgical arm, around upper body, and close buttons. Pt needed min-moderate assisting donning sling with contact guard. Pt was able to put surgical hand into sling, but needed assistnace to guide sling around her non surgical side. OT assisted pt in securing sling and putting it back into proper position. Pt did not have any LE clothing to doff. Pt recalled stratgies for LE dressing such as the figure 4 and asked OT for which foot to put in first. OT gave verbal cues with supervision for LE dressing with supervision. Pt was able to successfully don underwar, pants, and slide on shoes mod I.   Therapeutic Procedures/Exercise   Therapeutic Procedure: strength, endurance, ROM, flexibillity minutes (91821) 13   Symptoms Noted During/After Treatment dizziness;increased pain   Treatment Detail/Skilled Intervention OT demonstrated and educated pt on exercises for post shoulder surgery. Pt stated that they felt dizzy while sitting on the side of the bed. Pt needed min/moderate verbal cues for flex/extension of wrist, supination/pronation of wrist, and add/abduction of wrist with OT  "supervision. Pt stated supination/pronation increased pain to 6. Pt required moderate vc's for tech with OT contact guard for eblow flexion/extension and Codman's Exercises.pt progressed to mod I/S following education.   Therapeutic Activities   Therapeutic Activity Minutes (33273) 19   Symptoms noted during/after treatment dizziness   Treatment Detail/Skilled Intervention OT provided education of how to get in and out of bed safely while having NWB shoulder percautions . Pt needed cues with supervision to be able to manuver from supine to seated at the end of the bed. Pt stated that they felt dizzy after sitting , BP WFL. With verbal prompts and contact guard for sit <> stand from bed to ambulate to BR back. Pt stated \"I'm doing the old lady shuffle\". Pt asked for clarification of how to sit down at the toliet. Pt was able to complete  toliet  transfer with supervision/mod I simulating home set up from OT and ambulate back to the bed. After dressing pt walked to the steps with OT supervision. Verbal cues given to walk up the stairs using the side stepping stragety to have a railing with access to non-surgical arm with min/moderate assistance. Pt was able to walk down the steps safetly with minimal cues via OT supervision. Throughout the treatment session pt improved ambulation progressed to S/independent with reduced dizziness, and needed less instruction.   OT Discharge Planning   OT Plan D/C pt from OT   OT Rationale for DC Rec Pt has reduced ROM and increased pain due to R shoulder surgery. Pt is NWB through R shoulder per percautions. Pts ability to participate in ADLs (dressing, tolieting, showering) and IADLs (laundry, home maintience, driving, etc.). Pts daughter present for education and will be staying with pt for 6 weeks and will be able to assist with ADLs and IADLS as needed. i.e. ADLs (dressing, tolieting, showering) and IADLs (laundry, home maintience, driving, etc.) and outpatient therapy per MD. " Recommend continued R UE exercises 2-3 times daily.   OT Brief overview of current status Min assistance for sling don/doff, min/SBA for UE dressing and LE mod I, toliet transfer mod I, stairs SBA with rails and CGA w/o rails   Total Session Time   Timed Code Treatment Minutes 57   Total Session Time (sum of timed and untimed services) 124      [facility]  OUTPATIENT OCCUPATIONAL THERAPY  EVALUATION  PLAN OF TREATMENT FOR OUTPATIENT REHABILITATION  (COMPLETE FOR INITIAL CLAIMS ONLY)  Patient's Last Name, First Name, M.I.  YOB: 1945  Stacey Allen                          Provider's Name  [facility] Medical Record No.  [id number]                             Onset Date:  01/03/24   Start of Care Date:  01/04/24   Type:     ___PT   _X_OT   ___SLP Medical Diagnosis:  Right reverse total shoulder arthroplasty.                    OT Diagnosis:  Impaired independence with ADLS and functional mobility Visits from SOC:  1     See note for plan of treatment, functional goals and certification details    I CERTIFY THE NEED FOR THESE SERVICES FURNISHED UNDER        THIS PLAN OF TREATMENT AND WHILE UNDER MY CARE     (Physician co-signature of this document indicates review and certification of the therapy plan).

## 2024-01-04 NOTE — PROGRESS NOTES
Patient vital signs are at baseline: Yes  Patient able to ambulate as they were prior to admission or with assist devices provided by therapies during their stay:  Yes  Patient MUST void prior to discharge:  Yes  Patient able to tolerate oral intake:  Yes  Pain has adequate pain control using Oral analgesics:  Yes  Does patient have an identified :  Yes  Has goal D/C date and time been discussed with patient:  Yes     Pt discharge home.

## 2024-01-04 NOTE — PROGRESS NOTES
"POD #1  S: S/P right reverse total shoulder arthroplasty. Minimal to moderate pain, controlled with oxycodone and vistaril. Tolerating medications well. No nausea/vomiting/diarrhea. No fever/chills reported. Has not started PT/OT. Compliant with sling.     O: B/P: 144/82, T: 97.7, P: 72, R: 18     Alert, seated. NAD.  Right UE: Dressing clean/dry/intact. Hemovac drain in place. Deltoid/elbow & wrist motor intact. Distally motor and sensory intact.   B/L LE: Misti's negative B/L.    Lab Results   Component Value Date    WBC 7.4 04/26/2022     Lab Results   Component Value Date    RBC 4.00 04/26/2022     Lab Results   Component Value Date    HGB 11.9 04/26/2022     Lab Results   Component Value Date    HCT 38.5 04/26/2022     No components found for: \"MCT\"  Lab Results   Component Value Date    MCV 96 04/26/2022     Lab Results   Component Value Date    MCH 29.8 04/26/2022     Lab Results   Component Value Date    MCHC 30.9 04/26/2022     Lab Results   Component Value Date    RDW 12.9 04/26/2022     Lab Results   Component Value Date     04/26/2022        X-ray: Postop xrays of right shoulder demonstrate good position of the reverse total shoulder implants with no loosening, lysis or interval complications evident.       S/P Right Reverse Total Shoulder Arthroplasty    Plan: Continue PT/OT per standard reverse TSA protocol.  Wear sling. Discontinue Hemovac drain. Change dressing o medipore dressing. Keep new dressing clean/dry/intact. Sponge bathe only. Continue pain mgmt with oxycodone,etc. Plan discharge home today. DVT prophylaxis with ECASA x 3 weeks. Follow up with Dr. Vazquez in clinic in 10-14 days.    "

## 2024-01-04 NOTE — PLAN OF CARE
Goal Outcome Evaluation:      Plan of Care Reviewed With: patient    Overall Patient Progress: improvingOverall Patient Progress: improving     Patient vital signs are at baseline: Yes  Patient able to ambulate as they were prior to admission or with assist devices provided by therapies during their stay:  No,  Reason:  Using bedside commode   Patient MUST void prior to discharge:  Yes  Patient able to tolerate oral intake:  Yes  Pain has adequate pain control using Oral analgesics:  Yes  Does patient have an identified :  Yes  Has goal D/C date and time been discussed with patient:  Yes       A&Ox4, scheduled tylenol/oxy x2 for pain, 2L NC, PIV SL

## 2024-01-04 NOTE — PROGRESS NOTES
Occupational Therapy Discharge Summary    Reason for therapy discharge:    All goals and outcomes met, no further needs identified.    Progress towards therapy goal(s). See goals on Care Plan in [facility] electronic health record for goal details.  Goals met    Therapy recommendation(s):    Continue home exercise program.  Pt has reduced ROM and increased pain due to R shoulder surgery. Pt is NWB through R shoulder per percautions. Pts ability to participate in ADLs (dressing, tolieting, showering) and IADLs (laundry, home maintience, driving, etc.). Pts daughter present for education and will be staying with pt for 6 weeks and will be able to assist with ADLs and IADLS as needed. i.e. ADLs (dressing, tolieting, showering) and IADLs (laundry, home maintience, driving, etc.) and outpatient therapy per MD. Recommend continued R UE exercises 2-3 times daily

## 2024-01-04 NOTE — PROGRESS NOTES
Patient vital signs are at baseline: Yes  Patient able to ambulate as they were prior to admission or with assist devices provided by therapies during their stay:  No,  Reason:  Up to BSC  Patient MUST void prior to discharge:  Yes  Patient able to tolerate oral intake:  Yes  Pain has adequate pain control using Oral analgesics:  Yes  Does patient have an identified :  Yes  Has goal D/C date and time been discussed with patient:  Yes

## 2024-02-29 ENCOUNTER — TRANSFERRED RECORDS (OUTPATIENT)
Dept: HEALTH INFORMATION MANAGEMENT | Facility: CLINIC | Age: 79
End: 2024-02-29
Payer: COMMERCIAL

## 2024-12-13 ENCOUNTER — LAB REQUISITION (OUTPATIENT)
Dept: LAB | Facility: CLINIC | Age: 79
End: 2024-12-13
Payer: COMMERCIAL

## 2024-12-13 DIAGNOSIS — I10 ESSENTIAL (PRIMARY) HYPERTENSION: ICD-10-CM

## 2024-12-13 DIAGNOSIS — R20.2 PARESTHESIA OF SKIN: ICD-10-CM

## 2024-12-13 DIAGNOSIS — R10.9 UNSPECIFIED ABDOMINAL PAIN: ICD-10-CM

## 2024-12-13 PROCEDURE — 86334 IMMUNOFIX E-PHORESIS SERUM: CPT | Mod: ORL | Performed by: PATHOLOGY

## 2024-12-13 PROCEDURE — 87086 URINE CULTURE/COLONY COUNT: CPT | Mod: ORL | Performed by: FAMILY MEDICINE

## 2024-12-13 PROCEDURE — 83550 IRON BINDING TEST: CPT | Mod: ORL | Performed by: FAMILY MEDICINE

## 2024-12-13 PROCEDURE — 80053 COMPREHEN METABOLIC PANEL: CPT | Mod: ORL | Performed by: FAMILY MEDICINE

## 2024-12-13 PROCEDURE — 82607 VITAMIN B-12: CPT | Mod: ORL | Performed by: FAMILY MEDICINE

## 2024-12-13 PROCEDURE — 82728 ASSAY OF FERRITIN: CPT | Mod: ORL | Performed by: FAMILY MEDICINE

## 2024-12-13 PROCEDURE — 84443 ASSAY THYROID STIM HORMONE: CPT | Mod: ORL | Performed by: FAMILY MEDICINE

## 2024-12-14 LAB
ALBUMIN SERPL BCG-MCNC: 4.4 G/DL (ref 3.5–5.2)
ALP SERPL-CCNC: 80 U/L (ref 40–150)
ALT SERPL W P-5'-P-CCNC: 17 U/L (ref 0–50)
ANION GAP SERPL CALCULATED.3IONS-SCNC: 13 MMOL/L (ref 7–15)
AST SERPL W P-5'-P-CCNC: 19 U/L (ref 0–45)
BILIRUB SERPL-MCNC: 0.6 MG/DL
BUN SERPL-MCNC: 32.6 MG/DL (ref 8–23)
CALCIUM SERPL-MCNC: 9.3 MG/DL (ref 8.8–10.4)
CHLORIDE SERPL-SCNC: 104 MMOL/L (ref 98–107)
CREAT SERPL-MCNC: 1.36 MG/DL (ref 0.51–0.95)
EGFRCR SERPLBLD CKD-EPI 2021: 40 ML/MIN/1.73M2
FERRITIN SERPL-MCNC: 50 NG/ML (ref 11–328)
GLUCOSE SERPL-MCNC: 96 MG/DL (ref 70–99)
HCO3 SERPL-SCNC: 24 MMOL/L (ref 22–29)
IRON BINDING CAPACITY (ROCHE): 342 UG/DL (ref 240–430)
IRON SATN MFR SERPL: 18 % (ref 15–46)
IRON SERPL-MCNC: 60 UG/DL (ref 37–145)
POTASSIUM SERPL-SCNC: 5.1 MMOL/L (ref 3.4–5.3)
PROT SERPL-MCNC: 7.2 G/DL (ref 6.4–8.3)
SODIUM SERPL-SCNC: 141 MMOL/L (ref 135–145)
TSH SERPL DL<=0.005 MIU/L-ACNC: 1.36 UIU/ML (ref 0.3–4.2)
VIT B12 SERPL-MCNC: 425 PG/ML (ref 232–1245)

## 2024-12-15 LAB — BACTERIA UR CULT: NORMAL

## 2024-12-16 LAB — PROT PATTERN SERPL IFE-IMP: NORMAL

## 2024-12-16 PROCEDURE — 86334 IMMUNOFIX E-PHORESIS SERUM: CPT | Mod: 26 | Performed by: PATHOLOGY

## 2024-12-22 ENCOUNTER — HEALTH MAINTENANCE LETTER (OUTPATIENT)
Age: 79
End: 2024-12-22

## 2025-02-04 ENCOUNTER — LAB REQUISITION (OUTPATIENT)
Dept: LAB | Facility: CLINIC | Age: 80
End: 2025-02-04
Payer: COMMERCIAL

## 2025-02-04 DIAGNOSIS — Z20.818 CONTACT WITH AND (SUSPECTED) EXPOSURE TO OTHER BACTERIAL COMMUNICABLE DISEASES: ICD-10-CM

## 2025-02-06 LAB — BACTERIA SPEC CULT: NORMAL

## 2025-02-14 ENCOUNTER — LAB REQUISITION (OUTPATIENT)
Dept: LAB | Facility: CLINIC | Age: 80
End: 2025-02-14
Payer: COMMERCIAL

## 2025-02-14 DIAGNOSIS — Z01.818 ENCOUNTER FOR OTHER PREPROCEDURAL EXAMINATION: ICD-10-CM

## 2025-02-14 PROCEDURE — 80048 BASIC METABOLIC PNL TOTAL CA: CPT | Mod: ORL | Performed by: FAMILY MEDICINE

## 2025-02-15 LAB
ANION GAP SERPL CALCULATED.3IONS-SCNC: 12 MMOL/L (ref 7–15)
BUN SERPL-MCNC: 32.8 MG/DL (ref 8–23)
CALCIUM SERPL-MCNC: 9.9 MG/DL (ref 8.8–10.4)
CHLORIDE SERPL-SCNC: 106 MMOL/L (ref 98–107)
CREAT SERPL-MCNC: 1.27 MG/DL (ref 0.51–0.95)
EGFRCR SERPLBLD CKD-EPI 2021: 43 ML/MIN/1.73M2
GLUCOSE SERPL-MCNC: 96 MG/DL (ref 70–99)
HCO3 SERPL-SCNC: 26 MMOL/L (ref 22–29)
POTASSIUM SERPL-SCNC: 4.3 MMOL/L (ref 3.4–5.3)
SODIUM SERPL-SCNC: 144 MMOL/L (ref 135–145)

## (undated) DEVICE — GLOVE BIOGEL PI MICRO INDICATOR UNDERGLOVE SZ 8.5 48985

## (undated) DEVICE — PREP DURAPREP 26ML APL 8630

## (undated) DEVICE — DRSG TEGADERM 2 1/2X 2 3/4"

## (undated) DEVICE — SOL WATER IRRIG 1000ML BOTTLE 2F7114

## (undated) DEVICE — BONE CEMENT MIXEVAC HI VAC W/CARTRIDGE 0306-563-000

## (undated) DEVICE — Device

## (undated) DEVICE — SU ETHIBOND 2 V-37 4X30" MX69G

## (undated) DEVICE — GLOVE BIOGEL PI ULTRATOUCH G SZ 8.5 42185

## (undated) DEVICE — NDL 20GA 1.5"

## (undated) DEVICE — ESU PENCIL W/SMOKE EVAC NEPTUNE STRYKER 0703-046-000

## (undated) DEVICE — LINEN TOWEL PACK X5 5464

## (undated) DEVICE — DRAPE IOBAN INCISE 23X17" 6650EZ

## (undated) DEVICE — SU MONOCRYL 3-0 PS-2 27" Y427H

## (undated) DEVICE — PACK OPEN SHOULDER SOP15OCFSC

## (undated) DEVICE — BUR ROUND 5MM CARBIDE LONG 5092-230

## (undated) DEVICE — NDL 19GA 1.5"

## (undated) DEVICE — PACK UNIVERSAL SPLIT 29131

## (undated) DEVICE — DRAPE STERI U 1015

## (undated) DEVICE — ESU ELEC NDL 1" E1552

## (undated) DEVICE — DRAIN ROUND W/RESERV KIT JACKSON PRATT 10FR 400ML SU130-402D

## (undated) DEVICE — ESU GROUND PAD UNIVERSAL W/O CORD

## (undated) DEVICE — BONE CEMENT MIXEVAC III HI VAC KIT  0206-015-000

## (undated) DEVICE — SPONGE COTTONOID 1/4X3" 80-1398

## (undated) DEVICE — ESU PENCIL W/HOLSTER E2350H

## (undated) DEVICE — DRSG ADAPTIC 3X8" 6113

## (undated) DEVICE — DRAPE STERI TOWEL LG 1010

## (undated) DEVICE — WRAP MCCONNELL ARM SUPPORT LG 12-401

## (undated) DEVICE — DRSG KERLIX 4 1/2"X4YDS ROLL 6715

## (undated) DEVICE — BUR STRK ROUND 4.8X51.2MM FAST CUT 8 FLUTE 1608-006-139

## (undated) DEVICE — SYR 50ML CATH TIP W/O NDL 309620

## (undated) DEVICE — SOL NACL 0.9% IRRIG 3000ML BAG 2B7477

## (undated) DEVICE — ESU CLEANER TIP 31142717

## (undated) DEVICE — SOL NACL 0.9% INJ 250ML BAG 2B1322Q

## (undated) DEVICE — SU PROLENE 3-0 PS-2 18" 8687H

## (undated) DEVICE — SYR 03ML LL W/O NDL 309657

## (undated) DEVICE — DRILL BIT TORNIER 3MM REVERSE STERILE DWD055

## (undated) DEVICE — MANIFOLD NEPTUNE 4 PORT 700-20

## (undated) DEVICE — SU VICRYL 0 CT-1 CR 8X18" J740D

## (undated) DEVICE — BLADE SAW SAGITTAL STRK 25X89.5X0.96MM 4/2000 2108-393-005

## (undated) DEVICE — DRSG STERI STRIP 1/2X4" R1547

## (undated) DEVICE — SPONGE RAY-TEC 4X8" 7318

## (undated) DEVICE — SUCTION IRR SYSTEM W/O TIP INTERPULSE HANDPIECE 0210-100-000

## (undated) DEVICE — SOL NACL 0.9% IRRIG 1000ML BOTTLE 2F7124

## (undated) DEVICE — GOWN IMPERVIOUS SPECIALTY XL/XLONG 39049

## (undated) RX ORDER — MORPHINE SULFATE 1 MG/ML
INJECTION, SOLUTION EPIDURAL; INTRATHECAL; INTRAVENOUS
Status: DISPENSED
Start: 2024-01-03

## (undated) RX ORDER — DEXAMETHASONE SODIUM PHOSPHATE 4 MG/ML
INJECTION, SOLUTION INTRA-ARTICULAR; INTRALESIONAL; INTRAMUSCULAR; INTRAVENOUS; SOFT TISSUE
Status: DISPENSED
Start: 2024-01-03

## (undated) RX ORDER — HYDROMORPHONE HCL IN WATER/PF 6 MG/30 ML
PATIENT CONTROLLED ANALGESIA SYRINGE INTRAVENOUS
Status: DISPENSED
Start: 2024-01-03

## (undated) RX ORDER — FENTANYL CITRATE 50 UG/ML
INJECTION, SOLUTION INTRAMUSCULAR; INTRAVENOUS
Status: DISPENSED
Start: 2024-01-03

## (undated) RX ORDER — EPINEPHRINE 1 MG/ML
INJECTION, SOLUTION INTRAMUSCULAR; SUBCUTANEOUS
Status: DISPENSED
Start: 2024-01-03

## (undated) RX ORDER — ONDANSETRON 2 MG/ML
INJECTION INTRAMUSCULAR; INTRAVENOUS
Status: DISPENSED
Start: 2024-01-03

## (undated) RX ORDER — FENTANYL CITRATE 0.05 MG/ML
INJECTION, SOLUTION INTRAMUSCULAR; INTRAVENOUS
Status: DISPENSED
Start: 2024-01-03

## (undated) RX ORDER — PROPOFOL 10 MG/ML
INJECTION, EMULSION INTRAVENOUS
Status: DISPENSED
Start: 2024-01-03

## (undated) RX ORDER — HYDROMORPHONE HYDROCHLORIDE 1 MG/ML
INJECTION, SOLUTION INTRAMUSCULAR; INTRAVENOUS; SUBCUTANEOUS
Status: DISPENSED
Start: 2024-01-03